# Patient Record
Sex: FEMALE | Race: WHITE | NOT HISPANIC OR LATINO | Employment: FULL TIME | URBAN - METROPOLITAN AREA
[De-identification: names, ages, dates, MRNs, and addresses within clinical notes are randomized per-mention and may not be internally consistent; named-entity substitution may affect disease eponyms.]

---

## 2017-01-31 ENCOUNTER — GENERIC CONVERSION - ENCOUNTER (OUTPATIENT)
Dept: OTHER | Facility: OTHER | Age: 57
End: 2017-01-31

## 2017-01-31 ENCOUNTER — TRANSCRIBE ORDERS (OUTPATIENT)
Dept: ADMINISTRATIVE | Facility: HOSPITAL | Age: 57
End: 2017-01-31

## 2017-01-31 DIAGNOSIS — Z12.31 VISIT FOR SCREENING MAMMOGRAM: Primary | ICD-10-CM

## 2017-02-06 ENCOUNTER — HOSPITAL ENCOUNTER (OUTPATIENT)
Dept: RADIOLOGY | Facility: CLINIC | Age: 57
Discharge: HOME | End: 2017-02-06
Payer: COMMERCIAL

## 2017-02-06 ENCOUNTER — ALLSCRIPTS OFFICE VISIT (OUTPATIENT)
Dept: OTHER | Facility: OTHER | Age: 57
End: 2017-02-06

## 2017-02-06 DIAGNOSIS — M25.552 PAIN IN LEFT HIP: ICD-10-CM

## 2017-02-06 DIAGNOSIS — M25.551 PAIN IN RIGHT HIP: ICD-10-CM

## 2017-02-06 DIAGNOSIS — M54.16 RADICULOPATHY OF LUMBAR REGION: ICD-10-CM

## 2017-02-06 DIAGNOSIS — M54.50 LOW BACK PAIN: ICD-10-CM

## 2017-02-06 PROCEDURE — 72100 X-RAY EXAM L-S SPINE 2/3 VWS: CPT

## 2017-02-06 PROCEDURE — 73521 X-RAY EXAM HIPS BI 2 VIEWS: CPT

## 2017-02-21 ENCOUNTER — ALLSCRIPTS OFFICE VISIT (OUTPATIENT)
Dept: OTHER | Facility: OTHER | Age: 57
End: 2017-02-21

## 2017-02-21 ENCOUNTER — LAB REQUISITION (OUTPATIENT)
Dept: LAB | Facility: HOSPITAL | Age: 57
End: 2017-02-21
Payer: COMMERCIAL

## 2017-02-21 DIAGNOSIS — R39.9 UNSPECIFIED SYMPTOMS AND SIGNS INVOLVING THE GENITOURINARY SYSTEM: ICD-10-CM

## 2017-02-21 LAB
BILIRUB UR QL STRIP: NEGATIVE
CLARITY UR: NORMAL
COLOR UR: YELLOW
GLUCOSE (HISTORICAL): NEGATIVE
HGB UR QL STRIP.AUTO: NORMAL
KETONES UR STRIP-MCNC: NORMAL MG/DL
LEUKOCYTE ESTERASE UR QL STRIP: NORMAL
NITRITE UR QL STRIP: NEGATIVE
PH UR STRIP.AUTO: 5.5 [PH]
PROT UR STRIP-MCNC: NEGATIVE MG/DL
SP GR UR STRIP.AUTO: 1.02
UROBILINOGEN UR QL STRIP.AUTO: 0.2

## 2017-02-21 PROCEDURE — 87077 CULTURE AEROBIC IDENTIFY: CPT | Performed by: FAMILY MEDICINE

## 2017-02-21 PROCEDURE — 87186 SC STD MICRODIL/AGAR DIL: CPT | Performed by: FAMILY MEDICINE

## 2017-02-21 PROCEDURE — 87086 URINE CULTURE/COLONY COUNT: CPT | Performed by: FAMILY MEDICINE

## 2017-02-24 LAB — BACTERIA UR CULT: NORMAL

## 2017-02-25 ENCOUNTER — GENERIC CONVERSION - ENCOUNTER (OUTPATIENT)
Dept: OTHER | Facility: OTHER | Age: 57
End: 2017-02-25

## 2017-02-27 ENCOUNTER — HOSPITAL ENCOUNTER (OUTPATIENT)
Dept: MAMMOGRAPHY | Facility: MEDICAL CENTER | Age: 57
Discharge: HOME | End: 2017-02-27
Payer: COMMERCIAL

## 2017-02-27 DIAGNOSIS — Z12.31 VISIT FOR SCREENING MAMMOGRAM: ICD-10-CM

## 2017-02-27 PROCEDURE — G0202 SCR MAMMO BI INCL CAD: HCPCS

## 2017-02-27 PROCEDURE — 77063 BREAST TOMOSYNTHESIS BI: CPT

## 2017-04-08 ENCOUNTER — APPOINTMENT (OUTPATIENT)
Dept: LAB | Facility: MEDICAL CENTER | Age: 57
End: 2017-04-08
Payer: COMMERCIAL

## 2017-04-08 ENCOUNTER — TRANSCRIBE ORDERS (OUTPATIENT)
Dept: ADMINISTRATIVE | Facility: HOSPITAL | Age: 57
End: 2017-04-08

## 2017-04-08 DIAGNOSIS — R05.9 COUGH: ICD-10-CM

## 2017-04-08 DIAGNOSIS — F32.A VASCULAR DEMENTIA WITH DEPRESSED MOOD (HCC): ICD-10-CM

## 2017-04-08 DIAGNOSIS — E78.5 OTHER AND UNSPECIFIED HYPERLIPIDEMIA: ICD-10-CM

## 2017-04-08 DIAGNOSIS — R05.9 COUGH: Primary | ICD-10-CM

## 2017-04-08 DIAGNOSIS — Z13.0 SCREENING FOR IRON DEFICIENCY ANEMIA: ICD-10-CM

## 2017-04-08 DIAGNOSIS — G47.00 PERSISTENT DISORDER OF INITIATING OR MAINTAINING SLEEP: ICD-10-CM

## 2017-04-08 DIAGNOSIS — F01.50 VASCULAR DEMENTIA WITH DEPRESSED MOOD (HCC): ICD-10-CM

## 2017-04-08 DIAGNOSIS — K21.9 GASTROESOPHAGEAL REFLUX DISEASE WITHOUT ESOPHAGITIS: ICD-10-CM

## 2017-04-08 LAB
ALBUMIN SERPL BCP-MCNC: 3.5 G/DL (ref 3.5–5)
ALP SERPL-CCNC: 87 U/L (ref 46–116)
ALT SERPL W P-5'-P-CCNC: 33 U/L (ref 12–78)
ANION GAP SERPL CALCULATED.3IONS-SCNC: 7 MMOL/L (ref 4–13)
AST SERPL W P-5'-P-CCNC: 21 U/L (ref 5–45)
BASOPHILS # BLD AUTO: 0.05 THOUSANDS/ΜL (ref 0–0.1)
BASOPHILS NFR BLD AUTO: 1 % (ref 0–1)
BILIRUB SERPL-MCNC: 0.43 MG/DL (ref 0.2–1)
BUN SERPL-MCNC: 11 MG/DL (ref 5–25)
CALCIUM SERPL-MCNC: 8.8 MG/DL (ref 8.3–10.1)
CHLORIDE SERPL-SCNC: 106 MMOL/L (ref 100–108)
CHOLEST SERPL-MCNC: 231 MG/DL (ref 50–200)
CO2 SERPL-SCNC: 26 MMOL/L (ref 21–32)
CREAT SERPL-MCNC: 0.68 MG/DL (ref 0.6–1.3)
EOSINOPHIL # BLD AUTO: 0.19 THOUSAND/ΜL (ref 0–0.61)
EOSINOPHIL NFR BLD AUTO: 4 % (ref 0–6)
ERYTHROCYTE [DISTWIDTH] IN BLOOD BY AUTOMATED COUNT: 13.5 % (ref 11.6–15.1)
GFR SERPL CREATININE-BSD FRML MDRD: >60 ML/MIN/1.73SQ M
GLUCOSE P FAST SERPL-MCNC: 94 MG/DL (ref 65–99)
HCT VFR BLD AUTO: 43.1 % (ref 34.8–46.1)
HDLC SERPL-MCNC: 83 MG/DL (ref 40–60)
HGB BLD-MCNC: 14.5 G/DL (ref 11.5–15.4)
LDLC SERPL CALC-MCNC: 122 MG/DL (ref 0–100)
LYMPHOCYTES # BLD AUTO: 1.98 THOUSANDS/ΜL (ref 0.6–4.47)
LYMPHOCYTES NFR BLD AUTO: 41 % (ref 14–44)
MCH RBC QN AUTO: 30.9 PG (ref 26.8–34.3)
MCHC RBC AUTO-ENTMCNC: 33.6 G/DL (ref 31.4–37.4)
MCV RBC AUTO: 92 FL (ref 82–98)
MONOCYTES # BLD AUTO: 0.34 THOUSAND/ΜL (ref 0.17–1.22)
MONOCYTES NFR BLD AUTO: 7 % (ref 4–12)
NEUTROPHILS # BLD AUTO: 2.27 THOUSANDS/ΜL (ref 1.85–7.62)
NEUTS SEG NFR BLD AUTO: 47 % (ref 43–75)
NRBC BLD AUTO-RTO: 0 /100 WBCS
PLATELET # BLD AUTO: 282 THOUSANDS/UL (ref 149–390)
PMV BLD AUTO: 9.7 FL (ref 8.9–12.7)
POTASSIUM SERPL-SCNC: 3.8 MMOL/L (ref 3.5–5.3)
PROT SERPL-MCNC: 7.2 G/DL (ref 6.4–8.2)
RBC # BLD AUTO: 4.7 MILLION/UL (ref 3.81–5.12)
SODIUM SERPL-SCNC: 139 MMOL/L (ref 136–145)
TRIGL SERPL-MCNC: 131 MG/DL
TSH SERPL DL<=0.05 MIU/L-ACNC: 3.51 UIU/ML (ref 0.36–3.74)
WBC # BLD AUTO: 4.84 THOUSAND/UL (ref 4.31–10.16)

## 2017-04-08 PROCEDURE — 84443 ASSAY THYROID STIM HORMONE: CPT

## 2017-04-08 PROCEDURE — 80061 LIPID PANEL: CPT

## 2017-04-08 PROCEDURE — 36415 COLL VENOUS BLD VENIPUNCTURE: CPT

## 2017-04-08 PROCEDURE — 85025 COMPLETE CBC W/AUTO DIFF WBC: CPT

## 2017-04-08 PROCEDURE — 80053 COMPREHEN METABOLIC PANEL: CPT

## 2017-04-25 ENCOUNTER — ALLSCRIPTS OFFICE VISIT (OUTPATIENT)
Dept: OTHER | Facility: OTHER | Age: 57
End: 2017-04-25

## 2017-10-07 ENCOUNTER — TRANSCRIBE ORDERS (OUTPATIENT)
Dept: ADMINISTRATIVE | Facility: HOSPITAL | Age: 57
End: 2017-10-07

## 2017-10-07 ENCOUNTER — APPOINTMENT (OUTPATIENT)
Dept: LAB | Facility: MEDICAL CENTER | Age: 57
End: 2017-10-07
Payer: COMMERCIAL

## 2017-10-07 DIAGNOSIS — Z13.0 SCREENING FOR IRON DEFICIENCY ANEMIA: ICD-10-CM

## 2017-10-07 DIAGNOSIS — F32.A VASCULAR DEMENTIA WITH DEPRESSED MOOD (HCC): ICD-10-CM

## 2017-10-07 DIAGNOSIS — G47.00 PERSISTENT DISORDER OF INITIATING OR MAINTAINING SLEEP: ICD-10-CM

## 2017-10-07 DIAGNOSIS — R05.9 COUGH: ICD-10-CM

## 2017-10-07 DIAGNOSIS — F01.50 VASCULAR DEMENTIA WITH DEPRESSED MOOD (HCC): ICD-10-CM

## 2017-10-07 DIAGNOSIS — K21.9 GASTROESOPHAGEAL REFLUX DISEASE WITHOUT ESOPHAGITIS: ICD-10-CM

## 2017-10-07 DIAGNOSIS — E78.5 HYPERLIPIDEMIA, UNSPECIFIED HYPERLIPIDEMIA TYPE: ICD-10-CM

## 2017-10-07 DIAGNOSIS — R05.9 COUGH: Primary | ICD-10-CM

## 2017-10-07 LAB
ALBUMIN SERPL BCP-MCNC: 3.5 G/DL (ref 3.5–5)
ALP SERPL-CCNC: 93 U/L (ref 46–116)
ALT SERPL W P-5'-P-CCNC: 31 U/L (ref 12–78)
ANION GAP SERPL CALCULATED.3IONS-SCNC: 6 MMOL/L (ref 4–13)
AST SERPL W P-5'-P-CCNC: 26 U/L (ref 5–45)
BASOPHILS # BLD AUTO: 0.04 THOUSANDS/ΜL (ref 0–0.1)
BASOPHILS NFR BLD AUTO: 1 % (ref 0–1)
BILIRUB SERPL-MCNC: 0.45 MG/DL (ref 0.2–1)
BUN SERPL-MCNC: 11 MG/DL (ref 5–25)
CALCIUM SERPL-MCNC: 8.4 MG/DL (ref 8.3–10.1)
CHLORIDE SERPL-SCNC: 106 MMOL/L (ref 100–108)
CHOLEST SERPL-MCNC: 242 MG/DL (ref 50–200)
CO2 SERPL-SCNC: 27 MMOL/L (ref 21–32)
CREAT SERPL-MCNC: 0.73 MG/DL (ref 0.6–1.3)
EOSINOPHIL # BLD AUTO: 0.19 THOUSAND/ΜL (ref 0–0.61)
EOSINOPHIL NFR BLD AUTO: 4 % (ref 0–6)
ERYTHROCYTE [DISTWIDTH] IN BLOOD BY AUTOMATED COUNT: 13 % (ref 11.6–15.1)
GFR SERPL CREATININE-BSD FRML MDRD: 90 ML/MIN/1.73SQ M
GLUCOSE P FAST SERPL-MCNC: 86 MG/DL (ref 65–99)
HCT VFR BLD AUTO: 44 % (ref 34.8–46.1)
HDLC SERPL-MCNC: 77 MG/DL (ref 40–60)
HGB BLD-MCNC: 14.9 G/DL (ref 11.5–15.4)
LDLC SERPL CALC-MCNC: 129 MG/DL (ref 0–100)
LYMPHOCYTES # BLD AUTO: 2.04 THOUSANDS/ΜL (ref 0.6–4.47)
LYMPHOCYTES NFR BLD AUTO: 42 % (ref 14–44)
MCH RBC QN AUTO: 31.2 PG (ref 26.8–34.3)
MCHC RBC AUTO-ENTMCNC: 33.9 G/DL (ref 31.4–37.4)
MCV RBC AUTO: 92 FL (ref 82–98)
MONOCYTES # BLD AUTO: 0.36 THOUSAND/ΜL (ref 0.17–1.22)
MONOCYTES NFR BLD AUTO: 7 % (ref 4–12)
NEUTROPHILS # BLD AUTO: 2.21 THOUSANDS/ΜL (ref 1.85–7.62)
NEUTS SEG NFR BLD AUTO: 46 % (ref 43–75)
NRBC BLD AUTO-RTO: 0 /100 WBCS
PLATELET # BLD AUTO: 315 THOUSANDS/UL (ref 149–390)
PMV BLD AUTO: 9.9 FL (ref 8.9–12.7)
POTASSIUM SERPL-SCNC: 4.2 MMOL/L (ref 3.5–5.3)
PROT SERPL-MCNC: 7.5 G/DL (ref 6.4–8.2)
RBC # BLD AUTO: 4.78 MILLION/UL (ref 3.81–5.12)
SODIUM SERPL-SCNC: 139 MMOL/L (ref 136–145)
TRIGL SERPL-MCNC: 180 MG/DL
TSH SERPL DL<=0.05 MIU/L-ACNC: 2.56 UIU/ML (ref 0.36–3.74)
WBC # BLD AUTO: 4.85 THOUSAND/UL (ref 4.31–10.16)

## 2017-10-07 PROCEDURE — 80061 LIPID PANEL: CPT

## 2017-10-07 PROCEDURE — 84443 ASSAY THYROID STIM HORMONE: CPT

## 2017-10-07 PROCEDURE — 80053 COMPREHEN METABOLIC PANEL: CPT

## 2017-10-07 PROCEDURE — 36415 COLL VENOUS BLD VENIPUNCTURE: CPT

## 2017-10-07 PROCEDURE — 85025 COMPLETE CBC W/AUTO DIFF WBC: CPT

## 2017-10-12 ENCOUNTER — ALLSCRIPTS OFFICE VISIT (OUTPATIENT)
Dept: OTHER | Facility: OTHER | Age: 57
End: 2017-10-12

## 2017-10-14 NOTE — PROGRESS NOTES
Assessment  1  Acquired hypothyroidism (244 9) (E03 9)   2  Depressive disorder (311) (F32 9)   3  Gastroesophageal reflux disease (530 81) (K21 9)   4  Hyperlipidemia (272 4) (E78 5)    Plan  Acquired hypothyroidism, Depressive disorder, Gastroesophageal reflux disease,  Hyperlipidemia    · (1) COMPREHENSIVE METABOLIC PANEL; Status:Active; Requested for:12Apr2018;    · (1) LIPID PANEL FASTING W DIRECT LDL REFLEX; Status:Active; Requested  for:12Apr2018;    · (1) TSH WITH FT4 REFLEX; Status:Active; Requested for:12Apr2018; Influenza vaccine needed    · Stop: Fluzone Quadrivalent Intramuscular Suspension    Discussion/Summary    Patient is a 78-year-old FDyslipidemia - reviewed, FLP with patient today  Her fasting lipid grossly unchanged she again was advised on the months following a very strict low-fat/low-cholesterol dietHypothyroidism - she is clinically asystematic today  Continue with current treatment of levothyroxine  Thyroid function testing has all been stable  Depressive disorder - patient's mood appears stable today  Continue with current treatment of Lexapro  GERD - patient's symptoms appear very well-controlled  Continue with dietary trigger avoidance as well as treatment with PPI  Follow-up in 6 months  Chief Complaint  Pt presents for medication f/u and BW review  History of Present Illness  Patient is a 58-year-old female presents today for a follow-up on her chronic conditions  She has depressive disorder, hypothyroidism, GERD, hyperlipidemia  She has been taking her medications regularly and tolerating them very well  Denies any side effects with the medications  Review of Systems    Constitutional: No fever, no chills, feels well, no tiredness, no recent weight gain or weight loss  Eyes: No complaints of eye pain, no red eyes, no eyesight problems, no discharge, no dry eyes, no itching of eyes     ENT: no complaints of earache, no loss of hearing, no nose bleeds, no nasal discharge, no sore throat, no hoarseness  Cardiovascular: No complaints of slow heart rate, no fast heart rate, no chest pain, no palpitations, no leg claudication, no lower extremity edema  Respiratory: No complaints of shortness of breath, no wheezing, no cough, no SOB on exertion, no orthopnea, no PND  Gastrointestinal: No complaints of abdominal pain, no constipation, no nausea or vomiting, no diarrhea, no bloody stools  Genitourinary: No complaints of dysuria, no incontinence, no pelvic pain, no dysmenorrhea, no vaginal discharge or bleeding  Musculoskeletal: No complaints of arthralgias, no myalgias, no joint swelling or stiffness, no limb pain or swelling  Integumentary: No complaints of skin rash or lesions, no itching, no skin wounds, no breast pain or lump  Neurological: No complaints of headache, no confusion, no convulsions, no numbness, no dizziness or fainting, no tingling, no limb weakness, no difficulty walking  Psychiatric: Not suicidal, no sleep disturbance, no anxiety or depression, no change in personality, no emotional problems  Endocrine: No complaints of proptosis, no hot flashes, no muscle weakness, no deepening of the voice, no feelings of weakness  Hematologic/Lymphatic: No complaints of swollen glands, no swollen glands in the neck, does not bleed easily, does not bruise easily  Active Problems  1  Acquired hypothyroidism (244 9) (E03 9)   2  Acute right hip pain (719 45) (M25 551)   3  Bilateral hip pain (719 45) (M25 551,M25 552)   4  Chronic cough (786 2) (R05)   5  Depressive disorder (311) (F32 9)   6  Gastroesophageal reflux disease (530 81) (K21 9)   7  Greater trochanteric bursitis, left (726 5) (M70 62)   8  Hyperlipidemia (272 4) (E78 5)   9  Insomnia (780 52) (G47 00)   10  Lower back pain (724 2) (M54 5)   11  Obesity (278 00) (E66 9)   12  Right lumbar radiculopathy (724 4) (M54 16)   13  Sacroiliitis (720 2) (M46 1)   14   Screening for iron deficiency anemia (V78 0) (Z13 0)    Past Medical History  1  History of Acute UTI (599 0) (N39 0)   2  History of acute bacterial sinusitis (V12 69) (Z87 09)   3  History of hiatal hernia (V12 79) (Z87 19)   4  History of Need for influenza vaccination (V04 81) (Z23)   5  History of Post herpetic neuralgia (053 19) (B02 29)   6  History of Symptoms involving urinary system (788 99) (R39 9)    The active problems and past medical history were reviewed and updated today  Surgical History    The surgical history was reviewed and updated today  Family History  Mother    1  Family history of diabetes mellitus (V18 0) (Z83 3)   2  Family history of Lymphocyte depleted Hodgkin lymphoma, unspecified body region  Sister    3  Family history of malignant neoplasm of female breast (V16 3) (Z80 3)    The family history was reviewed and updated today  Social History   · Always uses seat belt   · Feels safe at home   · Former smoker (F86 09) (D72 200)  The social history was reviewed and updated today  The social history was reviewed and is unchanged  Current Meds   1  B Complex CAPS; Therapy: (Recorded:51Geg0359) to Recorded   2  Biotin CAPS; take 1 capsule daily; Therapy: (Recorded:92Wrx0387) to Recorded   3  Centrum Silver Adult 50+ Oral Tablet; Therapy: (Recorded:17Wns9523) to Recorded   4  Escitalopram Oxalate 10 MG Oral Tablet; take 1 tablet every day; Therapy: 81RNJ7429 to (PIUAGOWQ:75KNL6909)  Requested for: 63Xos7508; Last   Rx:94Xmh8208 Ordered   5  Iron TABS; Therapy: (Recorded:28Bnk3019) to Recorded   6  [address] [facility]; Therapy: (Recorded:27Kyk7087) to Recorded   7  Levothyroxine Sodium 75 MCG Oral Tablet; Take 1 tablet daily; Therapy: 71WOU2837 to (Last Rx:03Oct2017)  Requested for: 14NIZ7908 Ordered   8  NexIUM 20 MG Oral Capsule Delayed Release; TAKE 3 CAPSULE Weekly; Therapy: (Recorded:12Oct2017) to Recorded   9   ProAir  (90 Base) MCG/ACT Inhalation Aerosol Solution; INHALE 1 TO 2 PUFFS   EVERY 4 TO 6 HOURS AS NEEDED; Therapy: 98NOC7864 to (Last Rx:53Lex4034) Ordered    The medication list was reviewed and updated today  Allergies  1  No Known Drug Allergies    Vitals  Vital Signs    Recorded: 48CMR7514 05:06PM   Temperature 99 3 F, Tympanic   Heart Rate 81   Pulse Quality Normal   Respiration Quality Normal   Respiration 17   Systolic 404, LUE, Sitting   Diastolic 90, LUE, Sitting   Height 5 ft 5 2 in   Weight 178 lb 3 oz   BMI Calculated 29 47   BSA Calculated 1 89   O2 Saturation 97, RA   LMP Hysterectomy   Pain Scale 0     Physical Exam    Constitutional   General appearance: No acute distress, well appearing and well nourished  Eyes   Conjunctiva and lids: No swelling, erythema or discharge  Pupils and irises: Equal, round and reactive to light  Ears, Nose, Mouth, and Throat   External inspection of ears and nose: Normal     Nasal mucosa, septum, and turbinates: Normal without edema or erythema  Oropharynx: Normal with no erythema, edema, exudate or lesions  Pulmonary   Respiratory effort: No increased work of breathing or signs of respiratory distress  Auscultation of lungs: Clear to auscultation  Cardiovascular   Auscultation of heart: Normal rate and rhythm, normal S1 and S2, without murmurs  Examination of extremities for edema and/or varicosities: Normal     Abdomen   Abdomen: Non-tender, no masses  Liver and spleen: No hepatomegaly or splenomegaly  Lymphatic   Palpation of lymph nodes in neck: No lymphadenopathy  Musculoskeletal   Gait and station: Normal     Inspection/palpation of joints, bones, and muscles: Normal     Skin   Skin and subcutaneous tissue: Normal without rashes or lesions  Neurologic   Cranial nerves: Cranial nerves 2-12 intact  Sensation: No sensory loss  Psychiatric   Orientation to person, place, and time: Normal     Mood and affect: Normal          Signatures   Electronically signed by :  Marlene Irby DO; Oct 13 2017  4:06PM EST                       (Author)

## 2017-10-16 ENCOUNTER — GENERIC CONVERSION - ENCOUNTER (OUTPATIENT)
Dept: OTHER | Facility: OTHER | Age: 57
End: 2017-10-16

## 2017-10-16 LAB
ADDITIONAL INFORMATION (HISTORICAL): NORMAL
ADEQUACY: (HISTORICAL): NORMAL
COMMENT (HISTORICAL): NORMAL
CYTOTECHNOLOGIST: (HISTORICAL): NORMAL
GENERAL CATEGORIZATION (HISTORICAL): NORMAL
HPV MRNA E6/E7 (HISTORICAL): NORMAL
INFECTION (HISTORICAL): NORMAL
INTERPRETATION (HISTORICAL): NORMAL
LMP (HISTORICAL): NORMAL
PATHOLOGIST (HISTORICAL): NORMAL
PREV. BX: (HISTORICAL): NORMAL
PREV. PAP (HISTORICAL): NORMAL
REVIEWED BY (HISTORICAL): NORMAL
SOURCE (HISTORICAL): NORMAL
SPECIMEN STATUS REPORT (HISTORICAL): NORMAL

## 2017-11-27 ENCOUNTER — ALLSCRIPTS OFFICE VISIT (OUTPATIENT)
Dept: OTHER | Facility: OTHER | Age: 57
End: 2017-11-27

## 2017-11-28 NOTE — PROGRESS NOTES
Assessment    1  Acute sinusitis, recurrence not specified, unspecified location (461 9) (J01 90)   2  Acute left otitis media (382 9) (H66 92)    Plan  Acute left otitis media    · Ciprodex 0 3-0 1 % Otic Suspension; INSTILL 4 DROPS IN THE AFFECTEDEAR(S) TWICE DAILY  Acute left otitis media, Acute sinusitis, recurrence not specified, unspecified location    · Amoxicillin-Pot Clavulanate 875-125 MG Oral Tablet; TAKE 1 TABLET EVERY 12HOURS UNTIL GONE   · Fluticasone Propionate 50 MCG/ACT Nasal Suspension; 2 sprays each nostril daily    Discussion/Summary    57-year-old female here today for concern of left ear symptoms for the past few days  Unfortunately she has chronic bilateral hair problems and has had several surgeries  She follows with ENT through [facility] and her specialist is currently on vacation  Based on assessment today I will place her on Ciprodex antibiotic ear drops that she will use to the left ear canal twice daily in addition to covering with oral antibiotics, Augmentin b i d  times 10 days  I recommended that we also have her start fluticasone nasal spray to help with sinus pressure and may also give her some ear pressure relief  If she has no better in the next week or so she needs to reach out to her ENT specialist    Possible side effects of new medications were reviewed with the patient/guardian today  The treatment plan was reviewed with the patient/guardian  The patient/guardian understands and agrees with the treatment plan      Chief Complaint  Pt c/o cough, congestion, and a bloody discharge from her L ear x 1 week  History of Present Illness  HPI: 55y/o female here today for 1 week of left ear bleeding and pain  She has chronic ear problems and sees ENT at [facility] of surgeries  she currently has sinus sxs  Review of Systems   Constitutional: No fever, no chills, feels well, no tiredness, no recent weight gain or loss,-- no fever-- and-- no chills    ENT: as noted in HPI  Cardiovascular: no complaints of slow or fast heart rate, no chest pain, no palpitations, no leg claudication or lower extremity edema  Respiratory: no complaints of shortness of breath, no wheezing, no dyspnea on exertion, no orthopnea or PND  Active Problems  1  Acquired hypothyroidism (244 9) (E03 9)   2  Acute right hip pain (719 45) (M25 551)   3  Bilateral hip pain (719 45) (M25 551,M25 552)   4  Chronic cough (786 2) (R05)   5  Depressive disorder (311) (F32 9)   6  Gastroesophageal reflux disease (530 81) (K21 9)   7  Greater trochanteric bursitis, left (726 5) (M70 62)   8  Hyperlipidemia (272 4) (E78 5)   9  Influenza vaccine needed (V04 81) (Z23)   10  Insomnia (780 52) (G47 00)   11  Lower back pain (724 2) (M54 5)   12  Obesity (278 00) (E66 9)   13  Right lumbar radiculopathy (724 4) (M54 16)   14  Sacroiliitis (720 2) (M46 1)   15  Screening for cervical cancer (V76 2) (Z12 4)   16  Screening for iron deficiency anemia (V78 0) (Z13 0)    Past Medical History  1  History of Acute UTI (599 0) (N39 0)   2  History of acute bacterial sinusitis (V12 69) (Z87 09)   3  History of hiatal hernia (V12 79) (Z87 19)   4  History of Need for influenza vaccination (V04 81) (Z23)   5  History of Post herpetic neuralgia (053 19) (B02 29)   6  History of Symptoms involving urinary system (788 99) (R39 9)    Family History  Mother    1  Family history of diabetes mellitus (V18 0) (Z83 3)   2  Family history of Lymphocyte depleted Hodgkin lymphoma, unspecified body region  Sister    3  Family history of malignant neoplasm of female breast (V16 3) (Z80 3)    Social History     · Always uses seat belt   · Feels safe at home   · Former smoker (R26 53) (N26 108)  The social history was reviewed and updated today  Current Meds   1  B Complex CAPS; Therapy: (Recorded:28Gpd8707) to Recorded   2  Biotin CAPS; take 1 capsule daily; Therapy: (Recorded:78Ujn0769) to Recorded   3   Centrum Silver Adult 50+ Oral Tablet; Therapy: (Recorded:96Pag1232) to Recorded   4  Escitalopram Oxalate 10 MG Oral Tablet; take 1 tablet every day; Therapy: 63LJC4029 to (LJNQIVIV:40YTR7862)  Requested for: 24Jul2017; Last Rx:39Qxi4655 Ordered   5  Iron TABS; Therapy: (Recorded:26Cyp3429) to Recorded   6  [address] [facility]; Therapy: (Recorded:86Dqw3138) to Recorded   7  Levothyroxine Sodium 75 MCG Oral Tablet; Take 1 tablet daily; Therapy: 75CCE0232 to (Last Rx:24Oct2017)  Requested for: 24Oct2017 Ordered   8  NexIUM 20 MG Oral Capsule Delayed Release; TAKE 3 CAPSULE Weekly; Therapy: (Recorded:12Oct2017) to Recorded   9  ProAir  (90 Base) MCG/ACT Inhalation Aerosol Solution; INHALE 1 TO 2 PUFFS EVERY 4 TO 6 HOURS AS NEEDED; Therapy: 89BNT7798 to (Last Rx:65Yjt0417) Ordered    The medication list was reviewed and updated today  Allergies  1  No Known Drug Allergies    Vitals   Recorded: 50XIA7718 05:02PM   Temperature 98 7 F, Tympanic   Heart Rate 76   Respiration Quality Normal   Respiration 18   Systolic 410, LUE, Sitting   Diastolic 78, LUE, Sitting   Weight 178 lb 4 oz   BMI Calculated 29 48   BSA Calculated 1 89   O2 Saturation 98, RA   Pain Scale 0       Physical Exam   Constitutional  General appearance: No acute distress, well appearing and well nourished  appears healthy,-- comfortable-- and-- appearance reflects stated age  Ears, Nose, Mouth, and Throat  Otoscopic examination: Abnormal  -- RIGHT tm DULL, SLIGHTLY RETRACTED, LEFT TM NOT CLEARLY VISIBLE WITH DRIED CERUMEN/BLOOD IN EAR CANAL  NO EAR CANAL SWELLING OR PUS  Nasal mucosa, septum, and turbinates: Abnormal   There was a mucoid discharge from both nares  The bilateral nasal mucosa was boggy,-- edematous-- and-- red  MILD SWELLING AND ERYTHEMA B/L  Oropharynx: Normal with no erythema, edema, exudate or lesions  Pulmonary  Respiratory effort: No increased work of breathing or signs of respiratory distress  Auscultation of lungs: Clear to auscultation  Cardiovascular  Auscultation of heart: Normal rate and rhythm, normal S1 and S2, without murmurs  Lymphatic  Palpation of lymph nodes in neck: No lymphadenopathy  Psychiatric  Orientation to person, place, and time: Normal    Mood and affect: Normal    Additional Exam:  vitals reviewed  Results/Data  PHQ-9 Adult Depression Screening 27Nov2017 05:07PM User, Zahraa     Test Name Result Flag Reference   PHQ-9 Adult Depression Score 1       Over the last two weeks, how often have you been bothered by any of the following problems? Little interest or pleasure in doing things: Not at all - 0 Feeling down, depressed, or hopeless: Not at all - 0 Trouble falling or staying asleep, or sleeping too much: Not at all - 0 Feeling tired or having little energy: Several days - 1 Poor appetite or over eating: Not at all - 0 Feeling bad about yourself - or that you are a failure or have let yourself or your family down: Not at all - 0 Trouble concentrating on things, such as reading the newspaper or watching television: Not at all - 0 Moving or speaking so slowly that other people could have noticed  Or the opposite -  being so fidgety or restless that you have been moving around a lot more than usual: Not at all - 0 Thoughts that you would be better off dead, or of hurting yourself in some way: Not at all - 0   PHQ-9 Adult Depression Screening Negative     PHQ-9 Difficulty Level Not difficult at all     PHQ-9 Severity Minimal Depression           Signatures   Electronically signed by : Ayana Allred, [facility]; Nov 27 2017  5:40PM EST                       (Author)    Electronically signed by :  Stevie Alfonso DO; Nov 27 2017 10:19PM EST                       (Author)

## 2017-12-28 ENCOUNTER — ALLSCRIPTS OFFICE VISIT (OUTPATIENT)
Dept: OTHER | Facility: OTHER | Age: 57
End: 2017-12-28

## 2017-12-29 NOTE — PROGRESS NOTES
Assessment   1  Acute sinusitis, recurrence not specified, unspecified location (461 9) (J01 90)   2  Acute bronchitis (466 0) (J20 9)    Plan   Acute bronchitis    · LevoFLOXacin 500 MG Oral Tablet; Take 1 tablet daily   · Promethazine-Codeine 6 25-10 MG/5ML Oral Syrup; TAKE 5 ML BY MOUTH AT    BEDTIME AS NEEDED    Discussion/Summary      Patient is a 58-year-old female with acute sinusitis and bronchitis  She was seen in the office a month ago and prescribed amoxicillin/clavulanic acid and developed a severe reaction  She had swelling of her face and then developed a rash and her skin was peeling  She was diagnosed with Bernal-Sheldon syndrome  She had been on steroids  She now has symptoms of cough, tightness on her chest and sinus drainage  I prescribed 10 days of levofloxacin and cough medicine with codeine for bedtime  Possible side effects of new medications were reviewed with the patient/guardian today  The treatment plan was reviewed with the patient/guardian  The patient/guardian understands and agrees with the treatment plan      Chief Complaint   1  Cold Symptoms  Patient presents with c/o cough and congestion  She stated that she also has some heaviness on her chest       History of Present Illness   HPI: Patient has bad cough  relates that she developed Valdo Neigh Sheldon's syndrome after being on Augmentin  Cold Symptoms: Calros [place] presents with complaints of cold symptoms  Associated symptoms include post nasal drainage,-- sore throat,-- productive cough,-- facial pain,-- fever-- and-- chills, but-- no nausea,-- no vomiting-- and-- no diarrhea  Review of Systems        Constitutional: No fever, no chills, feels well, no tiredness, no recent weight gain or loss  ENT: as noted in HPI  Cardiovascular: no complaints of slow or fast heart rate, no chest pain, no palpitations, no leg claudication or lower extremity edema  Respiratory: as noted in HPI        Gastrointestinal: no complaints of abdominal pain, no constipation, no nausea or diarrhea, no vomiting, no bloody stools  Active Problems   1  Acquired hypothyroidism (244 9) (E03 9)   2  Acute sinusitis, recurrence not specified, unspecified location (461 9) (J01 90)   3  Bilateral hip pain (719 45) (M25 551,M25 552)   4  Chronic cough (786 2) (R05)   5  Depressive disorder (311) (F32 9)   6  Gastroesophageal reflux disease (530 81) (K21 9)   7  Greater trochanteric bursitis, left (726 5) (M70 62)   8  Hyperlipidemia (272 4) (E78 5)   9  Insomnia (780 52) (G47 00)   10  Lower back pain (724 2) (M54 5)   11  Obesity (278 00) (E66 9)   12  Right lumbar radiculopathy (724 4) (M54 16)   13  Sacroiliitis (720 2) (M46 1)    Past Medical History   1  History of Acute UTI (599 0) (N39 0)   2  History of acute bacterial sinusitis (V12 69) (Z87 09)   3  History of hiatal hernia (V12 79) (Z87 19)   4  History of Need for influenza vaccination (V04 81) (Z23)   5  History of Post herpetic neuralgia (053 19) (B02 29)   6  History of Symptoms involving urinary system (788 99) (R39 9)  Active Problems And Past Medical History Reviewed: The active problems and past medical history were reviewed and updated today  Family History   Mother    1  Family history of diabetes mellitus (V18 0) (Z83 3)   2  Family history of Lymphocyte depleted Hodgkin lymphoma, unspecified body region  Sister    3  Family history of malignant neoplasm of female breast (V16 3) (Z80 3)    Social History    · Always uses seat belt   · Feels safe at home   · Former smoker (M43 04) (P16 216)  The social history was reviewed and updated today  The social history was reviewed and is unchanged  Current Meds    1  B Complex CAPS; Therapy: (Recorded:43Njg2915) to Recorded   2  Biotin CAPS; take 1 capsule daily; Therapy: (Recorded:23Yio8639) to Recorded   3  Centrum Silver Adult 50+ Oral Tablet; Therapy: (Recorded:23Joc6663) to Recorded   4   Escitalopram Oxalate 10 MG Oral Tablet; take 1 tablet every day; Therapy: 67VSH0596 to (BZGCPGNU:45XKJ6737)  Requested for: 09Cyt5711; Last     Rx:50Dpa5703 Ordered   5  Fluticasone Propionate 50 MCG/ACT Nasal Suspension; 2 sprays each nostril daily; Therapy: 82WAL3478 to (Last 781 1704)  Requested for: [phone] Ordered   6  Iron TABS; Therapy: (Recorded:88Zma3660) to Recorded   7  [address] [facility]; Therapy: (Recorded:69Tkn7921) to Recorded   8  Levothyroxine Sodium 75 MCG Oral Tablet; Take 1 tablet daily; Therapy: 85JPX8101 to (Last Rx:24Oct2017)  Requested for: 24Oct2017 Ordered   9  NexIUM 20 MG Oral Capsule Delayed Release; TAKE 3 CAPSULE Weekly; Therapy: (Recorded:12Oct2017) to Recorded   10  ProAir  (90 Base) MCG/ACT Inhalation Aerosol Solution; INHALE 1 TO 2 PUFFS      EVERY 4 TO 6 HOURS AS NEEDED; Therapy: 27RBZ2600 to (Last Rx:14Jnt5935) Ordered     The medication list was reviewed and updated today  Allergies   1  Amoxicillin-Pot Clavulanate TABS    Vitals    Recorded: 28Dec2017 05:15PM   Temperature 98 6 F, Tympanic   Heart Rate 84   Pulse Quality Normal   Systolic 678, LUE, Sitting   Diastolic 92, LUE, Sitting   BP CUFF SIZE Large   Height 5 ft 5 in   Weight 177 lb 9 oz   BMI Calculated 29 55   BSA Calculated 1 88   O2 Saturation 96, RA     Physical Exam        Ears, Nose, Mouth, and Throat      External inspection of ears and nose: Normal        Otoscopic examination: Abnormal  -- right TM appearsscarred  fluid in left ear canal       Oropharynx: Abnormal   The posterior pharynx was erythematous-- and-- with thick post nasal drainage  , but-- did not have an exudate  Pulmonary      Respiratory effort: No increased work of breathing or signs of respiratory distress  Auscultation of lungs: Abnormal  -- Coarse breath sounds bilaterally  Cardiovascular      Auscultation of heart: Normal rate and rhythm, normal S1 and S2, without murmurs  -- /90  Lymphatic      Palpation of lymph nodes in neck: No lymphadenopathy         Musculoskeletal      Gait and station: Normal        Psychiatric      Orientation to person, place, and time: Normal        Mood and affect: Normal           Signatures    Electronically signed by : Mk Modi DO; Dec 28 2017  5:43PM EST                       (Author)

## 2018-01-12 VITALS
HEART RATE: 73 BPM | SYSTOLIC BLOOD PRESSURE: 136 MMHG | DIASTOLIC BLOOD PRESSURE: 90 MMHG | BODY MASS INDEX: 29.16 KG/M2 | HEIGHT: 65 IN | OXYGEN SATURATION: 98 % | TEMPERATURE: 99.5 F | WEIGHT: 175 LBS

## 2018-01-13 VITALS
HEART RATE: 81 BPM | TEMPERATURE: 99.3 F | DIASTOLIC BLOOD PRESSURE: 90 MMHG | WEIGHT: 178.19 LBS | BODY MASS INDEX: 29.69 KG/M2 | OXYGEN SATURATION: 97 % | RESPIRATION RATE: 17 BRPM | HEIGHT: 65 IN | SYSTOLIC BLOOD PRESSURE: 142 MMHG

## 2018-01-13 VITALS
OXYGEN SATURATION: 98 % | SYSTOLIC BLOOD PRESSURE: 124 MMHG | WEIGHT: 178.25 LBS | BODY MASS INDEX: 29.66 KG/M2 | TEMPERATURE: 98.7 F | DIASTOLIC BLOOD PRESSURE: 78 MMHG | HEART RATE: 76 BPM | RESPIRATION RATE: 18 BRPM

## 2018-01-13 NOTE — RESULT NOTES
Verified Results  (1) URINE CULTURE 95Elj4286 05:02PM Jolie Archibald Order Number: [id number]     Test Name Result Flag Reference   CLINICAL REPORT (Report)     Test:        Urine culture  Specimen Type:   Urine  Specimen Date:   2/21/2017 5:02 PM  Result Date:    2/24/2017 11:31 AM  Result Status:   Final result  Resulting Lab:   [facility] [address] [place] [address]            Tel: [phone]      CULTURE                                       ------------------                                   80,000-89,000 cfu/ml Escherichia coli      SUSCEPTIBILITY                                   ------------------                                                       Escherichia coli  METHOD                 ALMA  -------------------------------------  --------------------------  AMPICILLIN ($$)             >16 00 ug/ml Resistant  AMPICILLIN + SULBACTAM ($)       >16/8 ug/ml  Resistant  AZTREONAM ($$$)             <=8 ug/ml   Susceptible  CEFAZOLIN ($)              >16 00 ug/ml Resistant  CEFOTAXIME ($)             8 ug/ml    Susceptible  CEFTAZIDIME ($$)            8 ug/ml    Susceptible  CEFTRIAXONE ($$)            32 00 ug/ml  Intermediate  CEFUROXIME ($$)             >16 ug/ml   Resistant  CIPROFLOXACIN ($)            <=1 00 ug/ml Susceptible  GENTAMICIN ($$)             <=4 ug/ml   Susceptible  LEVOFLOXACIN ($)            <=2 00 ug/ml Susceptible  NITROFURANTOIN             <=32 ug/ml  Susceptible  PIPERACILLIN + TAZOBACTAM ($$$)     <=16 ug/ml  Susceptible  TETRACYCLINE              >8 ug/ml   Resistant  TOBRAMYCIN ($)             <=4 ug/ml   Susceptible  TRIMETHOPRIM + SULFAMETHOXAZOLE ($$$)  >2/38 ug/ml  Resistant

## 2018-01-14 VITALS
OXYGEN SATURATION: 98 % | BODY MASS INDEX: 30.2 KG/M2 | WEIGHT: 181.25 LBS | DIASTOLIC BLOOD PRESSURE: 96 MMHG | SYSTOLIC BLOOD PRESSURE: 150 MMHG | HEART RATE: 78 BPM | TEMPERATURE: 97.5 F | HEIGHT: 65 IN

## 2018-01-14 VITALS
SYSTOLIC BLOOD PRESSURE: 142 MMHG | BODY MASS INDEX: 29.84 KG/M2 | DIASTOLIC BLOOD PRESSURE: 86 MMHG | HEART RATE: 108 BPM | HEIGHT: 65 IN | WEIGHT: 179.13 LBS

## 2018-01-23 VITALS
HEART RATE: 84 BPM | TEMPERATURE: 98.6 F | OXYGEN SATURATION: 96 % | DIASTOLIC BLOOD PRESSURE: 92 MMHG | HEIGHT: 65 IN | WEIGHT: 177.56 LBS | BODY MASS INDEX: 29.58 KG/M2 | SYSTOLIC BLOOD PRESSURE: 132 MMHG

## 2018-02-27 ENCOUNTER — TRANSCRIBE ORDERS (OUTPATIENT)
Dept: ADMINISTRATIVE | Facility: HOSPITAL | Age: 58
End: 2018-02-27

## 2018-02-27 DIAGNOSIS — Z12.31 VISIT FOR SCREENING MAMMOGRAM: Primary | ICD-10-CM

## 2018-03-15 ENCOUNTER — HOSPITAL ENCOUNTER (OUTPATIENT)
Dept: MAMMOGRAPHY | Facility: MEDICAL CENTER | Age: 58
Discharge: HOME | End: 2018-03-15
Payer: COMMERCIAL

## 2018-03-15 DIAGNOSIS — Z12.31 VISIT FOR SCREENING MAMMOGRAM: ICD-10-CM

## 2018-03-15 PROCEDURE — 77063 BREAST TOMOSYNTHESIS BI: CPT

## 2018-03-15 PROCEDURE — 77067 SCR MAMMO BI INCL CAD: CPT

## 2018-04-12 DIAGNOSIS — E78.5 HYPERLIPIDEMIA: ICD-10-CM

## 2018-04-12 DIAGNOSIS — K21.9 GASTRO-ESOPHAGEAL REFLUX DISEASE WITHOUT ESOPHAGITIS: ICD-10-CM

## 2018-04-12 DIAGNOSIS — F32.9 MAJOR DEPRESSIVE DISORDER, SINGLE EPISODE: ICD-10-CM

## 2018-04-12 DIAGNOSIS — E03.9 HYPOTHYROIDISM: ICD-10-CM

## 2018-04-22 DIAGNOSIS — E03.9 HYPOTHYROIDISM, UNSPECIFIED TYPE: Primary | ICD-10-CM

## 2018-04-22 RX ORDER — LEVOTHYROXINE SODIUM 0.07 MG/1
TABLET ORAL
Qty: 90 TABLET | Refills: 1 | Status: SHIPPED | OUTPATIENT
Start: 2018-04-22 | End: 2018-06-11 | Stop reason: SDUPTHER

## 2018-06-07 ENCOUNTER — TRANSCRIBE ORDERS (OUTPATIENT)
Dept: FAMILY MEDICINE CLINIC | Facility: CLINIC | Age: 58
End: 2018-06-07

## 2018-06-08 RX ORDER — ESOMEPRAZOLE MAGNESIUM 20 MG/1
3 FOR SUSPENSION ORAL WEEKLY
COMMUNITY
End: 2018-06-11

## 2018-06-08 RX ORDER — ALBUTEROL SULFATE 90 UG/1
2 AEROSOL, METERED RESPIRATORY (INHALATION)
Status: ON HOLD | COMMUNITY
Start: 2016-07-13 | End: 2019-02-05 | Stop reason: ALTCHOICE

## 2018-06-08 RX ORDER — LACTOBACILLUS ACIDOPHILUS 700MM CELL
1 CAPSULE ORAL DAILY
Status: ON HOLD | COMMUNITY
End: 2019-02-05 | Stop reason: ALTCHOICE

## 2018-06-08 RX ORDER — B-COMPLEX WITH VITAMIN C
CAPSULE ORAL
COMMUNITY
End: 2022-02-21

## 2018-06-08 RX ORDER — FERROUS SULFATE 325(65) MG
TABLET ORAL
COMMUNITY
End: 2018-06-11

## 2018-06-08 RX ORDER — ESCITALOPRAM OXALATE 10 MG/1
1 TABLET ORAL DAILY
COMMUNITY
Start: 2016-07-13 | End: 2018-07-21 | Stop reason: SDUPTHER

## 2018-06-08 RX ORDER — ESOMEPRAZOLE MAGNESIUM 10 MG/1
GRANULE, FOR SUSPENSION, EXTENDED RELEASE ORAL
COMMUNITY
End: 2019-06-18

## 2018-06-08 RX ORDER — FERROUS SULFATE 325(65) MG
325 TABLET ORAL
Status: ON HOLD | COMMUNITY
End: 2019-02-05 | Stop reason: ALTCHOICE

## 2018-06-08 RX ORDER — MULTIVITAMIN WITH FOLIC ACID 400 MCG
1 TABLET ORAL
COMMUNITY
End: 2018-06-11

## 2018-06-11 ENCOUNTER — APPOINTMENT (OUTPATIENT)
Dept: LAB | Facility: MEDICAL CENTER | Age: 58
End: 2018-06-11
Payer: COMMERCIAL

## 2018-06-11 ENCOUNTER — OFFICE VISIT (OUTPATIENT)
Dept: FAMILY MEDICINE CLINIC | Facility: CLINIC | Age: 58
End: 2018-06-11
Payer: COMMERCIAL

## 2018-06-11 VITALS
OXYGEN SATURATION: 98 % | DIASTOLIC BLOOD PRESSURE: 82 MMHG | TEMPERATURE: 99.2 F | BODY MASS INDEX: 30.9 KG/M2 | HEIGHT: 64 IN | RESPIRATION RATE: 16 BRPM | WEIGHT: 181 LBS | HEART RATE: 79 BPM | SYSTOLIC BLOOD PRESSURE: 132 MMHG

## 2018-06-11 DIAGNOSIS — F32.A DEPRESSIVE DISORDER: ICD-10-CM

## 2018-06-11 DIAGNOSIS — E03.9 HYPOTHYROIDISM: ICD-10-CM

## 2018-06-11 DIAGNOSIS — F32.9 MAJOR DEPRESSIVE DISORDER, SINGLE EPISODE: ICD-10-CM

## 2018-06-11 DIAGNOSIS — E78.5 HYPERLIPIDEMIA, UNSPECIFIED HYPERLIPIDEMIA TYPE: Primary | ICD-10-CM

## 2018-06-11 DIAGNOSIS — K21.9 GASTROESOPHAGEAL REFLUX DISEASE WITHOUT ESOPHAGITIS: ICD-10-CM

## 2018-06-11 DIAGNOSIS — E03.9 HYPOTHYROIDISM, UNSPECIFIED TYPE: ICD-10-CM

## 2018-06-11 DIAGNOSIS — K21.9 GASTRO-ESOPHAGEAL REFLUX DISEASE WITHOUT ESOPHAGITIS: ICD-10-CM

## 2018-06-11 DIAGNOSIS — E78.5 HYPERLIPIDEMIA: ICD-10-CM

## 2018-06-11 PROBLEM — M46.1 SACROILIITIS (HCC): Status: ACTIVE | Noted: 2017-02-06

## 2018-06-11 LAB
ALBUMIN SERPL BCP-MCNC: 3.7 G/DL (ref 3.5–5)
ALP SERPL-CCNC: 97 U/L (ref 46–116)
ALT SERPL W P-5'-P-CCNC: 33 U/L (ref 12–78)
ANION GAP SERPL CALCULATED.3IONS-SCNC: 11 MMOL/L (ref 4–13)
AST SERPL W P-5'-P-CCNC: 27 U/L (ref 5–45)
BILIRUB SERPL-MCNC: 0.43 MG/DL (ref 0.2–1)
BUN SERPL-MCNC: 12 MG/DL (ref 5–25)
CALCIUM SERPL-MCNC: 9.1 MG/DL (ref 8.3–10.1)
CHLORIDE SERPL-SCNC: 102 MMOL/L (ref 100–108)
CHOLEST SERPL-MCNC: 241 MG/DL (ref 50–200)
CO2 SERPL-SCNC: 25 MMOL/L (ref 21–32)
CREAT SERPL-MCNC: 0.78 MG/DL (ref 0.6–1.3)
GFR SERPL CREATININE-BSD FRML MDRD: 83 ML/MIN/1.73SQ M
GLUCOSE P FAST SERPL-MCNC: 72 MG/DL (ref 65–99)
HDLC SERPL-MCNC: 67 MG/DL (ref 40–60)
LDLC SERPL CALC-MCNC: 107 MG/DL (ref 0–100)
POTASSIUM SERPL-SCNC: 3.9 MMOL/L (ref 3.5–5.3)
PROT SERPL-MCNC: 7.5 G/DL (ref 6.4–8.2)
SODIUM SERPL-SCNC: 138 MMOL/L (ref 136–145)
T4 FREE SERPL-MCNC: 0.84 NG/DL (ref 0.76–1.46)
TRIGL SERPL-MCNC: 333 MG/DL
TSH SERPL DL<=0.05 MIU/L-ACNC: 5.7 UIU/ML (ref 0.36–3.74)

## 2018-06-11 PROCEDURE — 84439 ASSAY OF FREE THYROXINE: CPT

## 2018-06-11 PROCEDURE — 80053 COMPREHEN METABOLIC PANEL: CPT

## 2018-06-11 PROCEDURE — 36415 COLL VENOUS BLD VENIPUNCTURE: CPT

## 2018-06-11 PROCEDURE — 1036F TOBACCO NON-USER: CPT | Performed by: FAMILY MEDICINE

## 2018-06-11 PROCEDURE — 80061 LIPID PANEL: CPT

## 2018-06-11 PROCEDURE — 99214 OFFICE O/P EST MOD 30 MIN: CPT | Performed by: FAMILY MEDICINE

## 2018-06-11 PROCEDURE — 84443 ASSAY THYROID STIM HORMONE: CPT

## 2018-06-11 RX ORDER — LEVOTHYROXINE SODIUM 88 UG/1
88 TABLET ORAL DAILY
Qty: 90 TABLET | Refills: 1 | Status: SHIPPED | OUTPATIENT
Start: 2018-06-11 | End: 2018-06-18 | Stop reason: SDUPTHER

## 2018-06-11 RX ORDER — NICOTINE POLACRILEX 2 MG
GUM BUCCAL
COMMUNITY

## 2018-06-11 NOTE — PROGRESS NOTES
Assessment/Plan:   1  Hypothyroidism, unspecified type  Patient has been noticing mild fatigue over the past few months  She believes that this was secondary to her changes in work  Her thyroid function testing appeared mildly abnormal   At this time, secondary to her hypothyroid state, will increase her dose of levothyroxine to 88 mcg  Recheck her blood work in 6 months  - levothyroxine 88 mcg tablet; Take 1 tablet (88 mcg total) by mouth daily for 90 days  Dispense: 90 tablet; Refill: 1  - TSH, 3rd generation with T4 reflex; Future    2  Hyperlipidemia, unspecified hyperlipidemia type  Reviewed fasting lipid panel with patient today  Her cholesterol levels appear uncontrolled  Her triglycerides have increased to 333  She was advised on the importance of maintaining a very strict low-fat/low-cholesterol diet  Will hold off on initiation of treatment  If her cholesterol is not improving at her next blood work checked, she will likely need to start statin treatment  - Lipid Panel with Direct LDL reflex; Future    3  Gastroesophageal reflux disease without esophagitis  Appears very well controlled  Continue with dietary trigger avoidance as well as current treatment with Nexium  4  Depressive disorder  Patient's mood appears very well controlled  At this time, continue with current treatment of Lexapro  Follow-up in 6 months  - Comprehensive metabolic panel; Future     There are no diagnoses linked to this encounter  Subjective:    Chief Complaint   Patient presents with    Follow-up     BW REVIEW         Patient ID: Humble Kumar is a 59 y o  female  Patient is a 47-year-old female presents for follow-up chronic conditions  She has depression, hyperlipidemia, GERD as well as hypothyroidism  She has been taking her medications regularly and tolerating them very well  Denies adverse reactions with the medications    She recently had blood work completed in light reviewed this today         Review of Systems   Constitutional: Negative for activity change, chills, fatigue and fever  HENT: Negative for congestion, ear pain, sinus pressure and sore throat  Eyes: Negative for redness, itching and visual disturbance  Respiratory: Negative for cough and shortness of breath  Cardiovascular: Negative for chest pain and palpitations  Gastrointestinal: Negative for abdominal pain, diarrhea and nausea  Endocrine: Negative for cold intolerance and heat intolerance  Genitourinary: Negative for dysuria, flank pain and frequency  Musculoskeletal: Negative for arthralgias, back pain, gait problem and myalgias  Skin: Negative for color change  Allergic/Immunologic: Negative for environmental allergies  Neurological: Negative for dizziness, numbness and headaches  Psychiatric/Behavioral: Negative for behavioral problems and sleep disturbance  The following portions of the patient's history were reviewed and updated as appropriate : past family history, past medical history, past social history and past surgical history  Objective:    Vitals:    06/11/18 1726   BP: 132/82   BP Location: Left arm   Patient Position: Sitting   Cuff Size: Adult   Pulse: 79   Resp: 16   Temp: 99 2 °F (37 3 °C)   TempSrc: Tympanic   SpO2: 98%   Weight: 82 1 kg (181 lb)   Height: 5' 3 94" (1 624 m)        Physical Exam   Constitutional: She is oriented to person, place, and time  She appears well-developed and well-nourished  HENT:   Head: Normocephalic and atraumatic  Nose: Nose normal    Mouth/Throat: No oropharyngeal exudate  Eyes: Pupils are equal, round, and reactive to light  Right eye exhibits no discharge  Left eye exhibits no discharge  Neck: Normal range of motion  Neck supple  No tracheal deviation present  Cardiovascular: Normal rate, regular rhythm and intact distal pulses  Exam reveals no gallop and no friction rub  No murmur heard    Pulses:       Dorsalis pedis pulses are 2+ on the right side, and 2+ on the left side  Posterior tibial pulses are 2+ on the right side, and 2+ on the left side  Pulmonary/Chest: Effort normal and breath sounds normal  No respiratory distress  She has no wheezes  She has no rales  Abdominal: Soft  Bowel sounds are normal  She exhibits no distension  There is no tenderness  There is no rebound and no guarding  Musculoskeletal: Normal range of motion  She exhibits no edema  Lymphadenopathy:        Head (right side): No submental and no submandibular adenopathy present  Head (left side): No submental and no submandibular adenopathy present  She has no cervical adenopathy  Right cervical: No superficial cervical, no deep cervical and no posterior cervical adenopathy present  Left cervical: No superficial cervical, no deep cervical and no posterior cervical adenopathy present  Neurological: She is alert and oriented to person, place, and time  No cranial nerve deficit or sensory deficit  Skin: Skin is warm, dry and intact  Psychiatric: Her speech is normal and behavior is normal  Judgment normal  Her mood appears not anxious  Cognition and memory are normal  She does not exhibit a depressed mood  Vitals reviewed

## 2018-06-18 DIAGNOSIS — E03.9 HYPOTHYROIDISM, UNSPECIFIED TYPE: ICD-10-CM

## 2018-06-18 RX ORDER — LEVOTHYROXINE SODIUM 88 UG/1
88 TABLET ORAL DAILY
Qty: 90 TABLET | Refills: 0 | Status: SHIPPED | OUTPATIENT
Start: 2018-06-18 | End: 2018-08-30 | Stop reason: SDUPTHER

## 2018-06-18 NOTE — TELEPHONE ENCOUNTER
RX was sent to [facility], patient is requesting that RX be sent to [facility] due to cost

## 2018-07-21 DIAGNOSIS — F32.A DEPRESSIVE DISORDER: Primary | ICD-10-CM

## 2018-07-21 RX ORDER — ESCITALOPRAM OXALATE 10 MG/1
TABLET ORAL
Qty: 90 TABLET | Refills: 1 | Status: SHIPPED | OUTPATIENT
Start: 2018-07-21 | End: 2019-01-17 | Stop reason: SDUPTHER

## 2018-08-30 DIAGNOSIS — E03.9 HYPOTHYROIDISM, UNSPECIFIED TYPE: ICD-10-CM

## 2018-08-31 RX ORDER — LEVOTHYROXINE SODIUM 88 UG/1
TABLET ORAL
Qty: 90 TABLET | Refills: 0 | Status: SHIPPED | OUTPATIENT
Start: 2018-08-31 | End: 2018-11-29 | Stop reason: SDUPTHER

## 2018-11-29 DIAGNOSIS — E03.9 HYPOTHYROIDISM, UNSPECIFIED TYPE: ICD-10-CM

## 2018-11-29 RX ORDER — LEVOTHYROXINE SODIUM 88 UG/1
TABLET ORAL
Qty: 90 TABLET | Refills: 0 | Status: SHIPPED | OUTPATIENT
Start: 2018-11-29 | End: 2019-02-27 | Stop reason: SDUPTHER

## 2018-12-11 LAB
ALBUMIN SERPL-MCNC: 4.2 G/DL (ref 3.6–5.1)
ALBUMIN/GLOB SERPL: 1.6 (CALC) (ref 1–2.5)
ALP SERPL-CCNC: 88 U/L (ref 33–130)
ALT SERPL-CCNC: 21 U/L (ref 6–29)
AST SERPL-CCNC: 23 U/L (ref 10–35)
BILIRUB SERPL-MCNC: 0.5 MG/DL (ref 0.2–1.2)
BUN SERPL-MCNC: 12 MG/DL (ref 7–25)
BUN/CREAT SERPL: NORMAL (CALC) (ref 6–22)
CALCIUM SERPL-MCNC: 9.1 MG/DL (ref 8.6–10.4)
CHLORIDE SERPL-SCNC: 106 MMOL/L (ref 98–110)
CHOLEST SERPL-MCNC: 229 MG/DL
CHOLEST/HDLC SERPL: 3.1 (CALC)
CO2 SERPL-SCNC: 25 MMOL/L (ref 20–32)
CREAT SERPL-MCNC: 0.71 MG/DL (ref 0.5–0.99)
GLOBULIN SER CALC-MCNC: 2.7 G/DL (CALC) (ref 1.9–3.7)
GLUCOSE SERPL-MCNC: 81 MG/DL (ref 65–99)
HDLC SERPL-MCNC: 73 MG/DL
LDLC SERPL CALC-MCNC: 122 MG/DL (CALC)
NONHDLC SERPL-MCNC: 156 MG/DL (CALC)
POTASSIUM SERPL-SCNC: 4.2 MMOL/L (ref 3.5–5.3)
PROT SERPL-MCNC: 6.9 G/DL (ref 6.1–8.1)
SL AMB EGFR AFRICAN AMERICAN: 107 ML/MIN/1.73M2
SL AMB EGFR NON AFRICAN AMERICAN: 93 ML/MIN/1.73M2
SODIUM SERPL-SCNC: 143 MMOL/L (ref 135–146)
TRIGL SERPL-MCNC: 216 MG/DL
TSH SERPL-ACNC: 1.17 MIU/L (ref 0.4–4.5)

## 2018-12-18 ENCOUNTER — OFFICE VISIT (OUTPATIENT)
Dept: FAMILY MEDICINE CLINIC | Facility: CLINIC | Age: 58
End: 2018-12-18
Payer: COMMERCIAL

## 2018-12-18 VITALS
TEMPERATURE: 99.5 F | OXYGEN SATURATION: 98 % | DIASTOLIC BLOOD PRESSURE: 70 MMHG | HEART RATE: 83 BPM | WEIGHT: 174 LBS | SYSTOLIC BLOOD PRESSURE: 128 MMHG | HEIGHT: 64 IN | BODY MASS INDEX: 29.71 KG/M2 | RESPIRATION RATE: 16 BRPM

## 2018-12-18 DIAGNOSIS — F32.A DEPRESSIVE DISORDER: ICD-10-CM

## 2018-12-18 DIAGNOSIS — E78.5 HYPERLIPIDEMIA, UNSPECIFIED HYPERLIPIDEMIA TYPE: ICD-10-CM

## 2018-12-18 DIAGNOSIS — K21.9 GASTROESOPHAGEAL REFLUX DISEASE WITHOUT ESOPHAGITIS: Primary | ICD-10-CM

## 2018-12-18 DIAGNOSIS — Z12.11 SCREENING FOR COLON CANCER: ICD-10-CM

## 2018-12-18 PROCEDURE — 99214 OFFICE O/P EST MOD 30 MIN: CPT | Performed by: FAMILY MEDICINE

## 2018-12-18 PROCEDURE — 3008F BODY MASS INDEX DOCD: CPT | Performed by: FAMILY MEDICINE

## 2018-12-18 PROCEDURE — 1036F TOBACCO NON-USER: CPT | Performed by: FAMILY MEDICINE

## 2018-12-18 NOTE — PROGRESS NOTES
Assessment/Plan:   1  Depressive disorder  Mood appears very well controlled today  She denies any worsening anxiety or depression  At this time, will continue with current treatment of Lexapro 10 mg daily  2  Gastroesophageal reflux disease without esophagitis  Symptoms appear very well controlled today  Continue with dietary trigger avoidance as well as current treatment with Nexium  She has been only using her Nexium 2 times per week dot    3  Hyperlipidemia, unspecified hyperlipidemia type  Reviewed fasting lipid panel with patient today  Her cholesterol levels appear improving  Her triglycerides are still  Continue with a strict low-fat/low-cholesterol diet  Follow up with patient in 6 months or p r n     4  Screening for colon cancer  - Ambulatory referral to General Surgery; Future     There are no diagnoses linked to this encounter  Subjective:    Chief Complaint   Patient presents with    Follow-up     6 months check up        Patient ID: Shraddha Poon is a 59 y o  female  Patient is a 68-year-old female presents today for a follow-up on her chronic conditions  She has depression, GERD, hyperlipidemia  She has been taking her medications dot denies adverse reactions with medications  Review of Systems   Constitutional: Negative for activity change, chills, fatigue and fever  HENT: Negative for congestion, ear pain, sinus pressure and sore throat  Eyes: Negative for redness, itching and visual disturbance  Respiratory: Negative for cough and shortness of breath  Cardiovascular: Negative for chest pain and palpitations  Gastrointestinal: Negative for abdominal pain, diarrhea and nausea  Endocrine: Negative for cold intolerance and heat intolerance  Genitourinary: Negative for dysuria, flank pain and frequency  Musculoskeletal: Negative for arthralgias, back pain, gait problem and myalgias  Skin: Negative for color change     Allergic/Immunologic: Negative for environmental allergies  Neurological: Negative for dizziness, numbness and headaches  Psychiatric/Behavioral: Negative for behavioral problems and sleep disturbance  The following portions of the patient's history were reviewed and updated as appropriate : past family history, past medical history, past social history and past surgical history  Current Outpatient Prescriptions:     B Complex-C CAPS, Take by mouth, Disp: , Rfl:     Biotin 1 MG CAPS, BID, Disp: , Rfl:     escitalopram (LEXAPRO) 10 mg tablet, TAKE 1 TABLET DAILY, Disp: 90 tablet, Rfl: 1    esomeprazole (NexIUM) 10 MG packet, Take by mouth, Disp: , Rfl:     Fish Oil-Krill Oil (KRILL OIL PLUS) CAPS, Take by mouth, Disp: , Rfl:     levothyroxine 88 mcg tablet, TAKE 1 TABLET DAILY, Disp: 90 tablet, Rfl: 0    Multiple Vitamins-Minerals (CENTRUM SILVER ADULT 50+ PO), Take by mouth, Disp: , Rfl:     albuterol (PROAIR HFA) 90 mcg/act inhaler, Inhale 2 puffs, Disp: , Rfl:     Calcium Carb-Cholecalciferol (CALCIUM 1000 + D PO), 1,000 mg BID, Disp: , Rfl:     ferrous sulfate 325 (65 Fe) mg tablet, Take 325 mg by mouth, Disp: , Rfl:     Lactobacillus (BIOTINEX) CAPS, Take 1 capsule by mouth daily, Disp: , Rfl:     Objective:    Vitals:    12/18/18 1723   BP: 128/70   BP Location: Left arm   Patient Position: Sitting   Cuff Size: Adult   Pulse: 83   Resp: 16   Temp: 99 5 °F (37 5 °C)   TempSrc: Tympanic   SpO2: 98%   Weight: 78 9 kg (174 lb)   Height: 5' 3 78" (1 62 m)        Physical Exam   Constitutional: She is oriented to person, place, and time  She appears well-developed and well-nourished  HENT:   Head: Normocephalic and atraumatic  Nose: Nose normal    Mouth/Throat: No oropharyngeal exudate  Eyes: Pupils are equal, round, and reactive to light  Right eye exhibits no discharge  Left eye exhibits no discharge  Neck: Normal range of motion  Neck supple  No tracheal deviation present     Cardiovascular: Normal rate, regular rhythm and intact distal pulses  Exam reveals no gallop and no friction rub  No murmur heard  Pulses:       Dorsalis pedis pulses are 2+ on the right side, and 2+ on the left side  Posterior tibial pulses are 2+ on the right side, and 2+ on the left side  Pulmonary/Chest: Effort normal and breath sounds normal  No respiratory distress  She has no wheezes  She has no rales  Abdominal: Soft  Bowel sounds are normal  She exhibits no distension  There is no tenderness  There is no rebound and no guarding  Musculoskeletal: Normal range of motion  She exhibits no edema  Lymphadenopathy:        Head (right side): No submental and no submandibular adenopathy present  Head (left side): No submental and no submandibular adenopathy present  She has no cervical adenopathy  Right cervical: No superficial cervical, no deep cervical and no posterior cervical adenopathy present  Left cervical: No superficial cervical, no deep cervical and no posterior cervical adenopathy present  Neurological: She is alert and oriented to person, place, and time  No cranial nerve deficit or sensory deficit  Skin: Skin is warm, dry and intact  Psychiatric: Her speech is normal and behavior is normal  Judgment normal  Her mood appears not anxious  Cognition and memory are normal  She does not exhibit a depressed mood  Vitals reviewed

## 2019-01-10 ENCOUNTER — OFFICE VISIT (OUTPATIENT)
Dept: FAMILY MEDICINE CLINIC | Facility: CLINIC | Age: 59
End: 2019-01-10
Payer: COMMERCIAL

## 2019-01-10 VITALS
HEART RATE: 89 BPM | WEIGHT: 176.3 LBS | HEIGHT: 64 IN | DIASTOLIC BLOOD PRESSURE: 80 MMHG | OXYGEN SATURATION: 98 % | SYSTOLIC BLOOD PRESSURE: 130 MMHG | BODY MASS INDEX: 30.1 KG/M2 | RESPIRATION RATE: 16 BRPM | TEMPERATURE: 99.8 F

## 2019-01-10 DIAGNOSIS — N39.0 ACUTE UTI: Primary | ICD-10-CM

## 2019-01-10 LAB
SL AMB  POCT GLUCOSE, UA: ABNORMAL
SL AMB LEUKOCYTE ESTERASE,UA: ABNORMAL
SL AMB POCT BILIRUBIN,UA: ABNORMAL
SL AMB POCT BLOOD,UA: ABNORMAL
SL AMB POCT CLARITY,UA: CLEAR
SL AMB POCT COLOR,UA: YELLOW
SL AMB POCT KETONES,UA: ABNORMAL
SL AMB POCT NITRITE,UA: ABNORMAL
SL AMB POCT PH,UA: 5
SL AMB POCT SPECIFIC GRAVITY,UA: 1.03
SL AMB POCT URINE PROTEIN: ABNORMAL
SL AMB POCT UROBILINOGEN: 0.2

## 2019-01-10 PROCEDURE — 81003 URINALYSIS AUTO W/O SCOPE: CPT | Performed by: FAMILY MEDICINE

## 2019-01-10 PROCEDURE — 99214 OFFICE O/P EST MOD 30 MIN: CPT | Performed by: FAMILY MEDICINE

## 2019-01-10 PROCEDURE — 3008F BODY MASS INDEX DOCD: CPT | Performed by: FAMILY MEDICINE

## 2019-01-10 PROCEDURE — 87086 URINE CULTURE/COLONY COUNT: CPT | Performed by: FAMILY MEDICINE

## 2019-01-10 RX ORDER — PHENAZOPYRIDINE HYDROCHLORIDE 200 MG/1
200 TABLET, FILM COATED ORAL
Qty: 21 TABLET | Refills: 0 | Status: ON HOLD | OUTPATIENT
Start: 2019-01-10 | End: 2019-02-05 | Stop reason: ALTCHOICE

## 2019-01-10 RX ORDER — CIPROFLOXACIN 500 MG/1
500 TABLET, FILM COATED ORAL EVERY 12 HOURS SCHEDULED
Qty: 14 TABLET | Refills: 0 | Status: SHIPPED | OUTPATIENT
Start: 2019-01-10 | End: 2019-01-17

## 2019-01-10 NOTE — PROGRESS NOTES
Assessment/Plan:   1  Acute UTI  Symptoms appear likely secondary to acute UTI  Urinalysis was positive for leukocytes and blood  Will send urine for culture  Start treatment empirically with Cipro as well as Pyridium  Follow up if any symptoms persist   - POCT urine dip auto non-scope  - Urine culture; Future  - Urine culture  - ciprofloxacin (CIPRO) 500 mg tablet; Take 1 tablet (500 mg total) by mouth every 12 (twelve) hours for 7 days  Dispense: 14 tablet; Refill: 0  - phenazopyridine (PYRIDIUM) 200 mg tablet; Take 1 tablet (200 mg total) by mouth 3 (three) times a day with meals  Dispense: 21 tablet; Refill: 0   Diagnoses and all orders for this visit:    Dysuria  -     POCT urine dip auto non-scope  -     Urine culture; Future  -     Urine culture          Subjective:    Chief Complaint   Patient presents with    Urinary Frequency     and burning times 1 week         Patient ID: Jeremy Balderrama is a 59 y o  female  Urinary Frequency    This is a new problem  The current episode started in the past 7 days  The problem occurs every urination  The problem has been unchanged  The quality of the pain is described as burning  The pain is mild  There has been no fever  Associated symptoms include frequency  Pertinent negatives include no chills, discharge, flank pain, hematuria, hesitancy, nausea or urgency  Treatments tried: Azo  The treatment provided mild relief  Review of Systems   Constitutional: Negative for activity change, chills, fatigue and fever  HENT: Negative for congestion, ear pain, sinus pressure and sore throat  Eyes: Negative for redness, itching and visual disturbance  Respiratory: Negative for cough and shortness of breath  Cardiovascular: Negative for chest pain and palpitations  Gastrointestinal: Negative for abdominal pain, diarrhea and nausea  Endocrine: Negative for cold intolerance and heat intolerance  Genitourinary: Positive for frequency   Negative for dysuria, flank pain, hematuria, hesitancy and urgency  Musculoskeletal: Negative for arthralgias, back pain, gait problem and myalgias  Skin: Negative for color change  Allergic/Immunologic: Negative for environmental allergies  Neurological: Negative for dizziness, numbness and headaches  Psychiatric/Behavioral: Negative for behavioral problems and sleep disturbance  The following portions of the patient's history were reviewed and updated as appropriate : past family history, past medical history, past social history and past surgical history  Current Outpatient Prescriptions:     albuterol (PROAIR HFA) 90 mcg/act inhaler, Inhale 2 puffs, Disp: , Rfl:     B Complex-C CAPS, Take by mouth, Disp: , Rfl:     Biotin 1 MG CAPS, BID, Disp: , Rfl:     Calcium Carb-Cholecalciferol (CALCIUM 1000 + D PO), 1,000 mg BID, Disp: , Rfl:     escitalopram (LEXAPRO) 10 mg tablet, TAKE 1 TABLET DAILY, Disp: 90 tablet, Rfl: 1    esomeprazole (NexIUM) 10 MG packet, Take by mouth, Disp: , Rfl:     ferrous sulfate 325 (65 Fe) mg tablet, Take 325 mg by mouth, Disp: , Rfl:     Fish Oil-Krill Oil (KRILL OIL PLUS) CAPS, Take by mouth, Disp: , Rfl:     Lactobacillus (BIOTINEX) CAPS, Take 1 capsule by mouth daily, Disp: , Rfl:     levothyroxine 88 mcg tablet, TAKE 1 TABLET DAILY, Disp: 90 tablet, Rfl: 0    Multiple Vitamins-Minerals (CENTRUM SILVER ADULT 50+ PO), Take by mouth, Disp: , Rfl:     Objective:    Vitals:    01/10/19 0749   BP: 130/80   BP Location: Left arm   Patient Position: Sitting   Cuff Size: Adult   Pulse: 89   Resp: 16   Temp: 99 8 °F (37 7 °C)   TempSrc: Tympanic   SpO2: 98%   Weight: 80 kg (176 lb 4 8 oz)   Height: 5' 4" (1 626 m)        Physical Exam   Constitutional: She is oriented to person, place, and time  She appears well-developed and well-nourished  HENT:   Head: Normocephalic and atraumatic  Nose: Nose normal    Mouth/Throat: No oropharyngeal exudate     Eyes: Pupils are equal, round, and reactive to light  Right eye exhibits no discharge  Left eye exhibits no discharge  Neck: Normal range of motion  Neck supple  No tracheal deviation present  Cardiovascular: Normal rate, regular rhythm and intact distal pulses  Exam reveals no gallop and no friction rub  No murmur heard  Pulses:       Dorsalis pedis pulses are 2+ on the right side, and 2+ on the left side  Posterior tibial pulses are 2+ on the right side, and 2+ on the left side  Pulmonary/Chest: Effort normal and breath sounds normal  No respiratory distress  She has no wheezes  She has no rales  Abdominal: Soft  Bowel sounds are normal  She exhibits no distension  There is no tenderness  There is no rebound and no guarding  Musculoskeletal: Normal range of motion  She exhibits no edema  Lymphadenopathy:        Head (right side): No submental and no submandibular adenopathy present  Head (left side): No submental and no submandibular adenopathy present  She has no cervical adenopathy  Right cervical: No superficial cervical, no deep cervical and no posterior cervical adenopathy present  Left cervical: No superficial cervical, no deep cervical and no posterior cervical adenopathy present  Neurological: She is alert and oriented to person, place, and time  No cranial nerve deficit or sensory deficit  Skin: Skin is warm, dry and intact  Psychiatric: Her speech is normal and behavior is normal  Judgment normal  Her mood appears not anxious  Cognition and memory are normal  She does not exhibit a depressed mood  Vitals reviewed

## 2019-01-12 LAB — BACTERIA UR CULT: ABNORMAL

## 2019-01-17 DIAGNOSIS — F32.A DEPRESSIVE DISORDER: ICD-10-CM

## 2019-01-17 RX ORDER — ESCITALOPRAM OXALATE 10 MG/1
TABLET ORAL
Qty: 90 TABLET | Refills: 1 | Status: SHIPPED | OUTPATIENT
Start: 2019-01-17 | End: 2019-07-16 | Stop reason: SDUPTHER

## 2019-01-22 ENCOUNTER — OFFICE VISIT (OUTPATIENT)
Dept: SURGERY | Facility: CLINIC | Age: 59
End: 2019-01-22
Payer: COMMERCIAL

## 2019-01-22 VITALS
WEIGHT: 173 LBS | HEART RATE: 109 BPM | RESPIRATION RATE: 16 BRPM | DIASTOLIC BLOOD PRESSURE: 62 MMHG | BODY MASS INDEX: 27.8 KG/M2 | SYSTOLIC BLOOD PRESSURE: 138 MMHG | TEMPERATURE: 98.2 F | HEIGHT: 66 IN

## 2019-01-22 DIAGNOSIS — Z12.11 ENCOUNTER FOR SCREENING COLONOSCOPY: Primary | ICD-10-CM

## 2019-01-22 PROCEDURE — 99243 OFF/OP CNSLTJ NEW/EST LOW 30: CPT | Performed by: PHYSICIAN ASSISTANT

## 2019-01-22 NOTE — LETTER
January 22, 2019     Orlando Mercado DO  [address]  [address] [facility]    Patient: Frances Heath   YOB: 1960   Date of Visit: 1/22/2019       Dear Dr May Rosado:    Thank you for referring Edvin Del Valle to me for evaluation  Below are my notes for this consultation  If you have questions, please do not hesitate to call me  I look forward to following your patient along with you  Sincerely,        Erin Coreas PA-C        CC: No Recipients  Anthony Marcia  1/22/2019  8:55 AM  Sign at close encounter  Assessment/Plan:   Frances Heath is a 59 y  o female who is here for a:     Screening Colonoscopy  Plan: Colonoscopy for: Screening for Colon Cancer        Previous Colonoscopy and  Location of polyps if any:   10 years ago and  with polyps in the :   no polyps or not applicable           Preoperative Clearance: None        ______________________________________________________    HPI:  Frances Heath is a 59 y  o female who was referred for evaluation of    Screening  Currently:     Symptoms include:  no abdominal pain, change in bowel habits, or black or bloody stools            Family history of colon cancer: None reported             Anticoagulation: none    LABS:      Lab Results   Component Value Date    WBC 4 85 10/07/2017    HGB 14 9 10/07/2017    HCT 44 0 10/07/2017    MCV 92 10/07/2017     10/07/2017     Lab Results   Component Value Date     12/19/2015    K 4 2 12/10/2018     12/10/2018    CO2 25 12/10/2018    ANIONGAP 9 12/19/2015    BUN 12 12/10/2018    CREATININE 0 78 06/11/2018    GLUCOSE 87 12/19/2015    GLUF 72 06/11/2018    CALCIUM 9 1 12/10/2018    AST 27 06/11/2018    ALT 33 06/11/2018    ALKPHOS 88 12/10/2018    PROT 7 5 12/19/2015    BILITOT 0 41 12/19/2015    EGFR 83 06/11/2018     No results found for: HGBA1C  No results found for: INR, PROTIME      No orders to display           ROS:  General ROS: negative for - chills, fatigue, fever or night sweats, weight loss  Respiratory ROS: no cough, shortness of breath, or wheezing  Cardiovascular ROS: no chest pain or dyspnea on exertion  Genito-Urinary ROS: no dysuria, trouble voiding, or hematuria  Musculoskeletal ROS: negative for - gait disturbance, joint pain or muscle pain  Neurological ROS: no TIA or stroke symptoms  GI ROS: see HPI  Skin ROS: no new rashes or lesions   Lymphatic ROS: no new adenopathy noted by pt  GYN ROS: see HPI, no new GYN history or bleeding noted  Psy ROS: no new mental or behavioral disturbances           Imaging: No new pertinent imaging studies           Patient Active Problem List   Diagnosis    Depressive disorder    Chronic cough    Hyperlipidemia    Insomnia    Gastroesophageal reflux disease    Sacroiliitis (HCC)         Allergies:  Amoxicillin-pot clavulanate      Current Outpatient Prescriptions:     B Complex-C CAPS, Take by mouth, Disp: , Rfl:     Biotin 1 MG CAPS, BID, Disp: , Rfl:     Calcium Carb-Cholecalciferol (CALCIUM 1000 + D PO), 1,000 mg BID, Disp: , Rfl:     escitalopram (LEXAPRO) 10 mg tablet, TAKE 1 TABLET DAILY, Disp: 90 tablet, Rfl: 1    esomeprazole (NexIUM) 10 MG packet, Take by mouth, Disp: , Rfl:     Fish Oil-Krill Oil (KRILL OIL PLUS) CAPS, Take by mouth, Disp: , Rfl:     levothyroxine 88 mcg tablet, TAKE 1 TABLET DAILY, Disp: 90 tablet, Rfl: 0    Multiple Vitamins-Minerals (CENTRUM SILVER ADULT 50+ PO), Take by mouth, Disp: , Rfl:     phenazopyridine (PYRIDIUM) 200 mg tablet, Take 1 tablet (200 mg total) by mouth 3 (three) times a day with meals, Disp: 21 tablet, Rfl: 0    albuterol (PROAIR HFA) 90 mcg/act inhaler, Inhale 2 puffs, Disp: , Rfl:     ferrous sulfate 325 (65 Fe) mg tablet, Take 325 mg by mouth, Disp: , Rfl:     Lactobacillus (BIOTINEX) CAPS, Take 1 capsule by mouth daily, Disp: , Rfl:     Past Medical History:   Diagnosis Date    Hiatal hernia     resolved 12/7/16    Post herpetic neuralgia     last assessed 7/13/16, resolved 12/7/16       Past Surgical History:   Procedure Laterality Date    EAR SURGERY      GASTRIC BYPASS      HYSTERECTOMY         Family History   Problem Relation Age of Onset    Diabetes Mother     Hodgkin's lymphoma Mother     Breast cancer Sister     Stroke Father        Social History     Social History    Marital status: /Civil Union     Spouse name: N/A    Number of children: N/A    Years of education: N/A     Social History Main Topics    Smoking status: Former Smoker    Smokeless tobacco: Never Used    Alcohol use Yes      Comment: SOCIAL    Drug use: No    Sexual activity: Yes     Partners: Male     Other Topics Concern    None     Social History Narrative    Always uses seat belt     Feels safe at home        Exam:   Vitals:    01/22/19 0844   BP: 138/62   Pulse: (!) 109   Resp: 16   Temp: 98 2 °F (36 8 °C)           ______________________________________________________    PHYSICAL EXAM  General Appearance:    Alert, cooperative, no distress, healthy     Head:    Normocephalic without obvious abnormality   Eyes:    PERRL, conjunctiva/corneas clear, EOM's intact        Neck:   Supple, no adenopathy, no JVD   Back:     Symmetric, no spinal or CVA tenderness   Lungs:     Clear to auscultation bilaterally, no wheezing or rhonchi   Heart:    Regular rate and rhythm, S1 and S2 normal, no murmur   Abdomen:     Soft, NTND, +BS   Extremities:   Extremities normal  No clubbing, cyanosis or edema   Psych:   Normal Affect   Neurologic:   CNII-XII intact  Strength symmetric, speech intact                 Agnes Ball PA-C  Date: 1/22/2019 Time: 8:54 AM       This report has been generated by a voice recognition software system  Therefore, there may be syntax, spelling, and/or grammatical errors  Please call if you've any questions  This  encounter has been billed by a non certified Coder            Informed consent for procedure was personally discussed, reviewed, and signed by Dr Ernie Koehler  Discussion by Dr Ernie Koehler was carried out regarding risks, benefits, and alternatives with the patient  Risks include but are not limited to:  bleeding, infection, and delayed wound healing or an open wound, pulmonary embolus, leaks from bowel or bile ducts or other viscus, transfusions, death  Discussed in further detail the more common complications and their rates of occurrence   was used if necessary  Patient expressed understanding of the issues discussed and wished/consented to proceed  All questions were answered by Dr Ernie Koehler  Discussion performed between patient and the provider signing below  Signature:   Mohini Nolan  Date: 1/22/2019 Time: 8:54 AM                                                                                                                                        Informed consent for procedure was personally discussed, reviewed, and signed by Dr Ernie Koehler  Discussion by Dr Ernie Koehler was carried out regarding risks, benefits, and alternatives with the patient  Risks include but are not limited to:  bleeding, infection, and delayed wound healing or an open wound, pulmonary embolus, leaks from bowel or bile ducts or other viscus, transfusions, death  Discussed in further detail the more common complications and their rates of occurrence   was used if necessary  Patient expressed understanding of the issues discussed and wished/consented to proceed  All questions were answered by Dr Ernie Koehler  Discussion performed between patient and the provider signing below  Signature:   Mohini Nolan    Date: 1/22/2019 Time: 8:54 AM           Some portions of this record may have been generated with voice recognition software  There may be translation, syntax,  or grammatical errors   Occasional wrong word or "sound-a-like" substitutions may have occurred due to the inherent limitations of the voice recognition software  Read the chart carefully and recognize, using context, where substitutions may have occurred  If you have any questions, please contact the dictating provider for clarification or correction, as needed  This encounter has been coded by a non-certified coder

## 2019-01-22 NOTE — PROGRESS NOTES
Assessment/Plan:   Gildardo Wood is a 59 y  o female who is here for a:     Screening Colonoscopy  Plan: Colonoscopy for: Screening for Colon Cancer        Previous Colonoscopy and  Location of polyps if any:   10 years ago and  with polyps in the :   no polyps or not applicable           Preoperative Clearance: None        ______________________________________________________    HPI:  Gildardo Wood is a 59 y  o female who was referred for evaluation of    Screening  Currently:     Symptoms include:  no abdominal pain, change in bowel habits, or black or bloody stools  Family history of colon cancer: None reported             Anticoagulation: none    LABS:      Lab Results   Component Value Date    WBC 4 85 10/07/2017    HGB 14 9 10/07/2017    HCT 44 0 10/07/2017    MCV 92 10/07/2017     10/07/2017     Lab Results   Component Value Date     12/19/2015    K 4 2 12/10/2018     12/10/2018    CO2 25 12/10/2018    ANIONGAP 9 12/19/2015    BUN 12 12/10/2018    CREATININE 0 78 06/11/2018    GLUCOSE 87 12/19/2015    GLUF 72 06/11/2018    CALCIUM 9 1 12/10/2018    AST 27 06/11/2018    ALT 33 06/11/2018    ALKPHOS 88 12/10/2018    PROT 7 5 12/19/2015    BILITOT 0 41 12/19/2015    EGFR 83 06/11/2018     No results found for: HGBA1C  No results found for: INR, PROTIME      No orders to display           ROS:  General ROS: negative for - chills, fatigue, fever or night sweats, weight loss  Respiratory ROS: no cough, shortness of breath, or wheezing  Cardiovascular ROS: no chest pain or dyspnea on exertion  Genito-Urinary ROS: no dysuria, trouble voiding, or hematuria  Musculoskeletal ROS: negative for - gait disturbance, joint pain or muscle pain  Neurological ROS: no TIA or stroke symptoms  GI ROS: see HPI  Skin ROS: no new rashes or lesions   Lymphatic ROS: no new adenopathy noted by pt     GYN ROS: see HPI, no new GYN history or bleeding noted  Psy ROS: no new mental or behavioral disturbances           Imaging: No new pertinent imaging studies           Patient Active Problem List   Diagnosis    Depressive disorder    Chronic cough    Hyperlipidemia    Insomnia    Gastroesophageal reflux disease    Sacroiliitis (HCC)         Allergies:  Amoxicillin-pot clavulanate      Current Outpatient Prescriptions:     B Complex-C CAPS, Take by mouth, Disp: , Rfl:     Biotin 1 MG CAPS, BID, Disp: , Rfl:     Calcium Carb-Cholecalciferol (CALCIUM 1000 + D PO), 1,000 mg BID, Disp: , Rfl:     escitalopram (LEXAPRO) 10 mg tablet, TAKE 1 TABLET DAILY, Disp: 90 tablet, Rfl: 1    esomeprazole (NexIUM) 10 MG packet, Take by mouth, Disp: , Rfl:     Fish Oil-Krill Oil (KRILL OIL PLUS) CAPS, Take by mouth, Disp: , Rfl:     levothyroxine 88 mcg tablet, TAKE 1 TABLET DAILY, Disp: 90 tablet, Rfl: 0    Multiple Vitamins-Minerals (CENTRUM SILVER ADULT 50+ PO), Take by mouth, Disp: , Rfl:     phenazopyridine (PYRIDIUM) 200 mg tablet, Take 1 tablet (200 mg total) by mouth 3 (three) times a day with meals, Disp: 21 tablet, Rfl: 0    albuterol (PROAIR HFA) 90 mcg/act inhaler, Inhale 2 puffs, Disp: , Rfl:     ferrous sulfate 325 (65 Fe) mg tablet, Take 325 mg by mouth, Disp: , Rfl:     Lactobacillus (BIOTINEX) CAPS, Take 1 capsule by mouth daily, Disp: , Rfl:     Past Medical History:   Diagnosis Date    Hiatal hernia     resolved 12/7/16    Post herpetic neuralgia     last assessed 7/13/16, resolved 12/7/16       Past Surgical History:   Procedure Laterality Date    EAR SURGERY      GASTRIC BYPASS      HYSTERECTOMY         Family History   Problem Relation Age of Onset    Diabetes Mother     Hodgkin's lymphoma Mother     Breast cancer Sister     Stroke Father        Social History     Social History    Marital status: /Civil Union     Spouse name: N/A    Number of children: N/A    Years of education: N/A     Social History Main Topics    Smoking status: Former Smoker    Smokeless tobacco: Never Used    Alcohol use Yes      Comment: SOCIAL    Drug use: No    Sexual activity: Yes     Partners: Male     Other Topics Concern    None     Social History Narrative    Always uses seat belt     Feels safe at home        Exam:   Vitals:    01/22/19 0844   BP: 138/62   Pulse: (!) 109   Resp: 16   Temp: 98 2 °F (36 8 °C)           ______________________________________________________    PHYSICAL EXAM  General Appearance:    Alert, cooperative, no distress, healthy     Head:    Normocephalic without obvious abnormality   Eyes:    PERRL, conjunctiva/corneas clear, EOM's intact        Neck:   Supple, no adenopathy, no JVD   Back:     Symmetric, no spinal or CVA tenderness   Lungs:     Clear to auscultation bilaterally, no wheezing or rhonchi   Heart:    Regular rate and rhythm, S1 and S2 normal, no murmur   Abdomen:     Soft, NTND, +BS   Extremities:   Extremities normal  No clubbing, cyanosis or edema   Psych:   Normal Affect   Neurologic:   CNII-XII intact  Strength symmetric, speech intact                 Marcos Curry PA-C  Date: 1/22/2019 Time: 8:54 AM       This report has been generated by a voice recognition software system  Therefore, there may be syntax, spelling, and/or grammatical errors  Please call if you've any questions  This  encounter has been billed by a non certified Coder  Informed consent for procedure was personally discussed, reviewed, and signed by Dr Nathalia Obando  Discussion by Dr Nathalia Obando was carried out regarding risks, benefits, and alternatives with the patient  Risks include but are not limited to:  bleeding, infection, and delayed wound healing or an open wound, pulmonary embolus, leaks from bowel or bile ducts or other viscus, transfusions, death  Discussed in further detail the more common complications and their rates of occurrence   was used if necessary    Patient expressed understanding of the issues discussed and wished/consented to proceed  All questions were answered by Dr Danielle Metz  Discussion performed between patient and the provider signing below  Signature:   Roselia Rg  Date: 1/22/2019 Time: 8:54 AM                                                                                                                                        Informed consent for procedure was personally discussed, reviewed, and signed by Dr Danielle Metz  Discussion by Dr Danielle Metz was carried out regarding risks, benefits, and alternatives with the patient  Risks include but are not limited to:  bleeding, infection, and delayed wound healing or an open wound, pulmonary embolus, leaks from bowel or bile ducts or other viscus, transfusions, death  Discussed in further detail the more common complications and their rates of occurrence   was used if necessary  Patient expressed understanding of the issues discussed and wished/consented to proceed  All questions were answered by Dr Danielle Metz  Discussion performed between patient and the provider signing below  Signature:   Roselia Rg    Date: 1/22/2019 Time: 8:54 AM           Some portions of this record may have been generated with voice recognition software  There may be translation, syntax,  or grammatical errors  Occasional wrong word or "sound-a-like" substitutions may have occurred due to the inherent limitations of the voice recognition software  Read the chart carefully and recognize, using context, where substitutions may have occurred  If you have any questions, please contact the dictating provider for clarification or correction, as needed  This encounter has been coded by a non-certified coder

## 2019-01-23 PROBLEM — Z12.11 COLON CANCER SCREENING: Status: ACTIVE | Noted: 2019-01-23

## 2019-02-04 ENCOUNTER — ANESTHESIA EVENT (OUTPATIENT)
Dept: GASTROENTEROLOGY | Facility: HOSPITAL | Age: 59
End: 2019-02-04
Payer: COMMERCIAL

## 2019-02-05 ENCOUNTER — HOSPITAL ENCOUNTER (OUTPATIENT)
Facility: HOSPITAL | Age: 59
Setting detail: OUTPATIENT SURGERY
Discharge: HOME | End: 2019-02-05
Attending: SURGERY | Admitting: SURGERY
Payer: COMMERCIAL

## 2019-02-05 ENCOUNTER — ANESTHESIA (OUTPATIENT)
Dept: GASTROENTEROLOGY | Facility: HOSPITAL | Age: 59
End: 2019-02-05
Payer: COMMERCIAL

## 2019-02-05 VITALS
HEIGHT: 66 IN | BODY MASS INDEX: 27.37 KG/M2 | WEIGHT: 170.3 LBS | SYSTOLIC BLOOD PRESSURE: 123 MMHG | TEMPERATURE: 98.1 F | OXYGEN SATURATION: 98 % | HEART RATE: 77 BPM | DIASTOLIC BLOOD PRESSURE: 71 MMHG | RESPIRATION RATE: 20 BRPM

## 2019-02-05 PROCEDURE — G0121 COLON CA SCRN NOT HI RSK IND: HCPCS | Performed by: SURGERY

## 2019-02-05 RX ORDER — PROPOFOL 10 MG/ML
INJECTION, EMULSION INTRAVENOUS AS NEEDED
Status: DISCONTINUED | OUTPATIENT
Start: 2019-02-05 | End: 2019-02-05 | Stop reason: SURG

## 2019-02-05 RX ORDER — SODIUM CHLORIDE 9 MG/ML
125 INJECTION, SOLUTION INTRAVENOUS CONTINUOUS
Status: DISCONTINUED | OUTPATIENT
Start: 2019-02-05 | End: 2019-02-05 | Stop reason: HOSPADM

## 2019-02-05 RX ADMIN — PROPOFOL 30 MG: 10 INJECTION, EMULSION INTRAVENOUS at 11:27

## 2019-02-05 RX ADMIN — PROPOFOL 20 MG: 10 INJECTION, EMULSION INTRAVENOUS at 11:30

## 2019-02-05 RX ADMIN — PROPOFOL 20 MG: 10 INJECTION, EMULSION INTRAVENOUS at 11:34

## 2019-02-05 RX ADMIN — PROPOFOL 70 MG: 10 INJECTION, EMULSION INTRAVENOUS at 11:23

## 2019-02-05 RX ADMIN — PROPOFOL 30 MG: 10 INJECTION, EMULSION INTRAVENOUS at 11:24

## 2019-02-05 RX ADMIN — PROPOFOL 30 MG: 10 INJECTION, EMULSION INTRAVENOUS at 11:29

## 2019-02-05 RX ADMIN — PROPOFOL 20 MG: 10 INJECTION, EMULSION INTRAVENOUS at 11:35

## 2019-02-05 RX ADMIN — LIDOCAINE HYDROCHLORIDE 20 MG: 20 INJECTION, SOLUTION INTRAVENOUS at 11:23

## 2019-02-05 RX ADMIN — PROPOFOL 30 MG: 10 INJECTION, EMULSION INTRAVENOUS at 11:32

## 2019-02-05 RX ADMIN — PROPOFOL 20 MG: 10 INJECTION, EMULSION INTRAVENOUS at 11:25

## 2019-02-05 RX ADMIN — SODIUM CHLORIDE 125 ML/HR: 0.9 INJECTION, SOLUTION INTRAVENOUS at 10:38

## 2019-02-05 NOTE — H&P (VIEW-ONLY)
Assessment/Plan:   Salvador Richard is a 59 y  o female who is here for a:     Screening Colonoscopy  Plan: Colonoscopy for: Screening for Colon Cancer        Previous Colonoscopy and  Location of polyps if any:   10 years ago and  with polyps in the :   no polyps or not applicable           Preoperative Clearance: None        ______________________________________________________    HPI:  Salvador Richard is a 59 y  o female who was referred for evaluation of    Screening  Currently:     Symptoms include:  no abdominal pain, change in bowel habits, or black or bloody stools  Family history of colon cancer: None reported             Anticoagulation: none    LABS:      Lab Results   Component Value Date    WBC 4 85 10/07/2017    HGB 14 9 10/07/2017    HCT 44 0 10/07/2017    MCV 92 10/07/2017     10/07/2017     Lab Results   Component Value Date     12/19/2015    K 4 2 12/10/2018     12/10/2018    CO2 25 12/10/2018    ANIONGAP 9 12/19/2015    BUN 12 12/10/2018    CREATININE 0 78 06/11/2018    GLUCOSE 87 12/19/2015    GLUF 72 06/11/2018    CALCIUM 9 1 12/10/2018    AST 27 06/11/2018    ALT 33 06/11/2018    ALKPHOS 88 12/10/2018    PROT 7 5 12/19/2015    BILITOT 0 41 12/19/2015    EGFR 83 06/11/2018     No results found for: HGBA1C  No results found for: INR, PROTIME      No orders to display           ROS:  General ROS: negative for - chills, fatigue, fever or night sweats, weight loss  Respiratory ROS: no cough, shortness of breath, or wheezing  Cardiovascular ROS: no chest pain or dyspnea on exertion  Genito-Urinary ROS: no dysuria, trouble voiding, or hematuria  Musculoskeletal ROS: negative for - gait disturbance, joint pain or muscle pain  Neurological ROS: no TIA or stroke symptoms  GI ROS: see HPI  Skin ROS: no new rashes or lesions   Lymphatic ROS: no new adenopathy noted by pt     GYN ROS: see HPI, no new GYN history or bleeding noted  Psy ROS: no new mental or behavioral disturbances           Imaging: No new pertinent imaging studies           Patient Active Problem List   Diagnosis    Depressive disorder    Chronic cough    Hyperlipidemia    Insomnia    Gastroesophageal reflux disease    Sacroiliitis (HCC)         Allergies:  Amoxicillin-pot clavulanate      Current Outpatient Prescriptions:     B Complex-C CAPS, Take by mouth, Disp: , Rfl:     Biotin 1 MG CAPS, BID, Disp: , Rfl:     Calcium Carb-Cholecalciferol (CALCIUM 1000 + D PO), 1,000 mg BID, Disp: , Rfl:     escitalopram (LEXAPRO) 10 mg tablet, TAKE 1 TABLET DAILY, Disp: 90 tablet, Rfl: 1    esomeprazole (NexIUM) 10 MG packet, Take by mouth, Disp: , Rfl:     Fish Oil-Krill Oil (KRILL OIL PLUS) CAPS, Take by mouth, Disp: , Rfl:     levothyroxine 88 mcg tablet, TAKE 1 TABLET DAILY, Disp: 90 tablet, Rfl: 0    Multiple Vitamins-Minerals (CENTRUM SILVER ADULT 50+ PO), Take by mouth, Disp: , Rfl:     phenazopyridine (PYRIDIUM) 200 mg tablet, Take 1 tablet (200 mg total) by mouth 3 (three) times a day with meals, Disp: 21 tablet, Rfl: 0    albuterol (PROAIR HFA) 90 mcg/act inhaler, Inhale 2 puffs, Disp: , Rfl:     ferrous sulfate 325 (65 Fe) mg tablet, Take 325 mg by mouth, Disp: , Rfl:     Lactobacillus (BIOTINEX) CAPS, Take 1 capsule by mouth daily, Disp: , Rfl:     Past Medical History:   Diagnosis Date    Hiatal hernia     resolved 12/7/16    Post herpetic neuralgia     last assessed 7/13/16, resolved 12/7/16       Past Surgical History:   Procedure Laterality Date    EAR SURGERY      GASTRIC BYPASS      HYSTERECTOMY         Family History   Problem Relation Age of Onset    Diabetes Mother     Hodgkin's lymphoma Mother     Breast cancer Sister     Stroke Father        Social History     Social History    Marital status: /Civil Union     Spouse name: N/A    Number of children: N/A    Years of education: N/A     Social History Main Topics    Smoking status: Former Smoker    Smokeless tobacco: Never Used    Alcohol use Yes      Comment: SOCIAL    Drug use: No    Sexual activity: Yes     Partners: Male     Other Topics Concern    None     Social History Narrative    Always uses seat belt     Feels safe at home        Exam:   Vitals:    01/22/19 0844   BP: 138/62   Pulse: (!) 109   Resp: 16   Temp: 98 2 °F (36 8 °C)           ______________________________________________________    PHYSICAL EXAM  General Appearance:    Alert, cooperative, no distress, healthy     Head:    Normocephalic without obvious abnormality   Eyes:    PERRL, conjunctiva/corneas clear, EOM's intact        Neck:   Supple, no adenopathy, no JVD   Back:     Symmetric, no spinal or CVA tenderness   Lungs:     Clear to auscultation bilaterally, no wheezing or rhonchi   Heart:    Regular rate and rhythm, S1 and S2 normal, no murmur   Abdomen:     Soft, NTND, +BS   Extremities:   Extremities normal  No clubbing, cyanosis or edema   Psych:   Normal Affect   Neurologic:   CNII-XII intact  Strength symmetric, speech intact                 Teola Junior, PA-C  Date: 1/22/2019 Time: 8:54 AM       This report has been generated by a voice recognition software system  Therefore, there may be syntax, spelling, and/or grammatical errors  Please call if you've any questions  This  encounter has been billed by a non certified Coder  Informed consent for procedure was personally discussed, reviewed, and signed by Dr Jose David Carey  Discussion by Dr Jose David Carey was carried out regarding risks, benefits, and alternatives with the patient  Risks include but are not limited to:  bleeding, infection, and delayed wound healing or an open wound, pulmonary embolus, leaks from bowel or bile ducts or other viscus, transfusions, death  Discussed in further detail the more common complications and their rates of occurrence   was used if necessary    Patient expressed understanding of the issues discussed and wished/consented to proceed  All questions were answered by Dr Aaron Hazel  Discussion performed between patient and the provider signing below  Signature:   [facility]  Date: 1/22/2019 Time: 8:54 AM                                                                                                                                        Informed consent for procedure was personally discussed, reviewed, and signed by Dr Aaron Hazel  Discussion by Dr Aaron Hazel was carried out regarding risks, benefits, and alternatives with the patient  Risks include but are not limited to:  bleeding, infection, and delayed wound healing or an open wound, pulmonary embolus, leaks from bowel or bile ducts or other viscus, transfusions, death  Discussed in further detail the more common complications and their rates of occurrence   was used if necessary  Patient expressed understanding of the issues discussed and wished/consented to proceed  All questions were answered by Dr Aaron Hazel  Discussion performed between patient and the provider signing below  Signature:   [facility]    Date: 1/22/2019 Time: 8:54 AM           Some portions of this record may have been generated with voice recognition software  There may be translation, syntax,  or grammatical errors  Occasional wrong word or "sound-a-like" substitutions may have occurred due to the inherent limitations of the voice recognition software  Read the chart carefully and recognize, using context, where substitutions may have occurred  If you have any questions, please contact the dictating provider for clarification or correction, as needed  This encounter has been coded by a non-certified coder

## 2019-02-05 NOTE — ANESTHESIA PREPROCEDURE EVALUATION
Review of Systems/Medical History  Patient summary reviewed  Chart reviewed  No history of anesthetic complications     Cardiovascular  Hyperlipidemia,    Pulmonary       GI/Hepatic    GERD well controlled,  Hiatal hernia, Bowel prep            Endo/Other     GYN       Hematology   Musculoskeletal  Back pain (SI pain) , lumbar pain,   Arthritis     Neurology   Psychology   Depression , being treated for depression,              Physical Exam    Airway    Mallampati score: II  TM Distance: >3 FB  Neck ROM: full     Dental   No notable dental hx     Cardiovascular  Rhythm: regular, Rate: normal, Cardiovascular exam normal    Pulmonary  Pulmonary exam normal Breath sounds clear to auscultation,     Other Findings        Anesthesia Plan  ASA Score- 2     Anesthesia Type- IV sedation with anesthesia with ASA Monitors  Additional Monitors:   Airway Plan:         Plan Factors-Patient not instructed to abstain from smoking on day of procedure  Patient did not smoke on day of surgery  Induction- intravenous  Postoperative Plan-     Informed Consent- Anesthetic plan and risks discussed with patient and daughter  I personally reviewed this patient with the CRNA  Discussed and agreed on the Anesthesia Plan with the CRNA  Bryant Jenkins

## 2019-02-05 NOTE — OP NOTE
/COLONOSCOPY  Postoperative Note  PATIENT NAME: Chinyere Thurman  : 1960  MRN: [id number]  [facility] ROOM 01    Surgery Date: 2019    Pre operative diagnosis:  Colon cancer screening [Z12 11]    Operative Indications:  Due for Colonoscopy :    Previous Colonoscopy if any, and  Location of polyps if any:   none          Consent:  The risks, benefits, and alternatives to the surgery were discussed with the patient and with the family prior to surgery if available, personally by Dr Deneen Sánchez  If the consent was obtained by the physician assistant or other representative, the consent was reviewed once again personally by the operating physician  Common complications particular for this procedure as well as unusual complications were discussed, including but not limited to:  bleeding, wound infection, prolonged wound healing, open wounds, reoperation, leak from the bowel or viscus, leak from the bile duct or injury to adjacent or other organs or blood vessels in the abdomen, and possible surgery or reoperation  If the surgery was laparoscopic, it was discussed that possible open surgery could also occur during that same surgery and is always an option in laparoscopic surgery  A  was used if necessary  The patient expressed understanding of the issues discussed and wished and consented to the procedure to proceed  All questions were answered  Dr Deneen Sánchez personally discussed the informed consent with this patient  Post-Operative Diagnosis and Findings:   Diverticulosis and Hemorrhoids       Procedure:   Procedure(s):  COLONOSCOPY            Surgeon(s) and Role:     * Gerard Wallace MD - Primary  I was present for the entire procedure  Specimens:  * No specimens in log *    Estimated Blood Loss:   Minimal    Anesthesia Type:   Choice     Procedure: The patient was seen in the Holding Room   The risks, benefits, complications, treatment options, and expected outcomes were discussed with the patient  The possibilities of reaction to medication, pulmonary aspiration, perforation of viscus, bleeding, recurrent infection, the need for additional procedures, failure to diagnose a condition, and creating a complication requiring transfusion or operation were discussed with the patient  The patient concurred with the proposed plan, giving informed consent  The patient was taken to Endoscopy Suite, identified as [facility]  Staff confirmed patient name, , and procedure  A Time Out was held and the above information confirmed  A visual and digital exam was carried out of the anus and anal canal   Findings were normal unless specified below  The colonoscope was passed per anus and traversed to the cecum  The cecum was clearly visualized in the right lower quadrant by light reflex and palpation  The scope was withdrawn  There were no mucosal lesions or polyps seen throughout the colon  There were diverticula scattered throughout the sigmoid colon and grade 1 and 2 hemorrhoids  A photograph was taken  The scope was retroflexed  There were no anorectal lesions other than the hemorrhoids  The scope was removed  The patient tolerated the procedure well  Withdrawal time was greater than 7 minutes  Prep was graded based on the [place] Bowel Preparation grading scale:     Cecum/Right colon: 2  Minor residual staining or residual fragments but muscoa seen well  Transverse/Hepatic flexure/Splenic Flexure: 2  Minor residual staining or residual fragments but muscoa seen well  Left/Sigmoid/Rectum: 2  Minor residual staining or residual fragments but muscoa seen well  The bowel preparation was: Good/Adequate      Some portions of this record may have been generated with voice recognition software  There may be translation, syntax,  or grammatical errors   Occasional wrong word or "sound-a-like" substitutions may have occurred due to the inherent limitations of the voice recognition software  Read the chart carefully and recognize, using context, where substations may have occurred  If you have any questions, please contact the dictating provider for clarification or correction, as needed  Complications: None    EBL: <- 0 5cc cc    Condition: Stable to PACU      Follow up endoscopy: Follow-up colonoscopy timing is difficult to predict without pathology and is not an exact science  Patients are told to follow up earlier than recommended if they have any symptoms or GI changes  However it is required that we predict possible endoscopy follow-up at the time of this procedure  Based on clinical appearance, follow-up colonoscopy is estimated to be recommended  in:  10 years for normal screening follow-up, or very small hyperplastic appearing polyps         SIGNATURE: Harris Bey MD   DATE: February 5, 2019   TIME: 11:38 AM

## 2019-02-05 NOTE — DISCHARGE INSTRUCTIONS
[facility]  Endoscopy Post-Operative Instructions  Dr Allie Salmeron MD, FACS    Procedure: Colonoscopy    Findings:  Diverticulosis and Hemorrhoids    Follow-Up: You will need a repeat Endoscopy in (generally)10 years  You should have an endoscopy sooner than recommended if you have any symptoms of bleeding or change in stools or other concerns  You will receive a call from our office with your results, in addition to the the preliminary results you received today  You will usually receive a follow-up letter from our office in 1-2 weeks  Call the office if you do not hear from us  You are welcome to also schedule an office visit if desired to discuss the results further  It is your responsibility to contact our office for results in 1- 2 weeks if you do not hear from us  If a follow up endoscopy is needed, you are responsible for arranging that follow up appointment at the appropriate time  The office may or may not issue a reminder at that future time  Please take responsibility for your own follow up healthcare  Diet: Eat a light snack first, and then resume your previous diet  Discharge Medications:  See after visit summary for reconciled discharge medications provided to patient and family  Current Facility-Administered Medications:     sodium chloride 0 9 % infusion, 125 mL/hr, Intravenous, Continuous, Shahnaz Ricks DO, Last Rate: 125 mL/hr at 02/05/19 1038, 125 mL/hr at 02/05/19 1038  Do not take aspirin or blood thinning medications for 2 days following procedure  Tylenol is okay  You may have been given a new medication  Please take this (usually an anti-ulcer -type medication) and see your primary care doctor for refills and follow up medications if needed       After the test: Notify physician for arm swelling or pain at the intravenous site  You may have some abdominal discomfort following the procedure   Belching or passing flatus will help relieve it  Following upper endoscopy, you may experience a temporary sore throat  Saline gargles or lozenges can be taken to relieve sore throat Following lower endoscopy, minor rectal bleeding is not uncommon      Activity: Do not drive a car, operate machinery, or sign legal documents for 24 hours after your procedure  Normal activity may be resumed on the day following the procedure      Call the office at [phone] for any of the following: Severe abdominal pain, significant rectal bleeding, chills, or fever above 100°, new onset of persistent cough or persistent vomiting      Emmie Kevin  [address] [place] [address], [address] [facility]  Phone: [phone]                    Diverticulosis   WHAT YOU NEED TO KNOW:   Diverticulosis is a condition that causes small pockets called diverticula to form in your intestine  These pockets make it difficult for bowel movements to pass through your digestive system  DISCHARGE INSTRUCTIONS:   Seek care immediately if:   · You have severe pain on the left side of your lower abdomen  · Your bowel movements are bright or dark red  Contact your healthcare provider if:   · You have a fever and chills  · You feel dizzy or lightheaded  · You have nausea, or you are vomiting  · You have a change in your bowel movements  · You have questions or concerns about your condition or care  Medicines:   · Medicines  to soften your bowel movements may be given  You may also need medicines to treat symptoms such as bloating and pain  · Take your medicine as directed  Contact your healthcare provider if you think your medicine is not helping or if you have side effects  Tell him or her if you are allergic to any medicine  Keep a list of the medicines, vitamins, and herbs you take  Include the amounts, and when and why you take them  Bring the list or the pill bottles to follow-up visits   Carry your medicine list with you in case of an emergency  Self-care: The goal of treatment is to manage any symptoms you have and prevent other problems such as diverticulitis  Diverticulitis is swelling or infection of the diverticula  Your healthcare provider may recommend any of the following:  · Eat a variety of high-fiber foods  High-fiber foods help you have regular bowel movements  High-fiber foods include cooked beans, fruits, vegetables, and some cereals  Most adults need 25 to 35 grams of fiber each day  Your healthcare provider may recommend that you have more  Ask your healthcare provider how much fiber you need  Increase fiber slowly  You may have abdominal discomfort, bloating, and gas if you add fiber to your diet too quickly  You may need to take a fiber supplement if you are not getting enough fiber from food  · Drink liquids as directed  You may need to drink 2 to 3 liters (8 to 12 cups) of liquids every day  Ask your healthcare provider how much liquid to drink each day and which liquids are best for you  · Apply heat  on your abdomen for 20 to 30 minutes every 2 hours for as many days as directed  Heat helps decrease pain and muscle spasms  Help prevent diverticulitis or other symptoms: The following may help decrease your risk for diverticulitis or symptoms, such as bleeding  Talk to your provider about these or other things you can do to prevent problems that may occur with diverticulosis  · Exercise regularly  Ask your healthcare provider about the best exercise plan for you  Exercise can help you have regular bowel movements  Get 30 minutes of exercise on most days of the week  · Maintain a healthy weight  Ask your healthcare provider how much you should weigh  Ask him or her to help you create a weight loss plan if you are overweight  · Do not smoke  Nicotine and other chemicals in cigarettes increase your risk for diverticulitis   Ask your healthcare provider for information if you currently smoke and need help to quit  E-cigarettes or smokeless tobacco still contain nicotine  Talk to your healthcare provider before you use these products  · Ask your healthcare provider if it is safe to take NSAIDs  NSAIDs may increase your risk of diverticulitis  Follow up with your healthcare provider as directed:  Write down your questions so you remember to ask them during your visits  © 2017 [address] Information is for End User's use only and may not be sold, redistributed or otherwise used for commercial purposes  All illustrations and images included in CareNotes® are the copyrighted property of [employer]  or Julien Campos  The above information is an  only  It is not intended as medical advice for individual conditions or treatments  Talk to your doctor, nurse or pharmacist before following any medical regimen to see if it is safe and effective for you  Diverticulosis Diet   WHAT YOU NEED TO KNOW:   What is a diverticulosis diet? A diverticulosis diet includes high-fiber foods  High-fiber foods help you have regular bowel movements  Extra fiber may decrease your risk of forming new diverticula (small pockets) in your intestine  A high-fiber diet may also help prevent diverticulitis  Diverticulitis is a painful condition that occurs when diverticula become inflamed or infected  You do not need to avoid nuts, seeds, corn, or popcorn while you are on a diverticulosis diet  How much fiber do I need? You may need 25 to 35 grams of fiber each day  Ask your dietitian or healthcare provider how much fiber you should have  Increase your intake of fiber slowly  When you eat more fiber, you may have gas and feel bloated  You may need to take a fiber supplement if you do not get enough fiber from food  Drink plenty of liquids as you increase the fiber in your diet  Your dietitian or healthcare provider may recommend 8 eight-ounce cups or more each day   Ask which liquids are best for you  Which foods are high in fiber? · Foods with at least 4 grams of fiber per serving:      ¨ ? to ½ cup of high-fiber cereal (check the nutrition label on the box)    ¨ ½ cup of blackberries or raspberries    ¨ 4 dried prunes    ¨ 1 cooked artichoke    ¨ ½ cup of cooked legumes, such as lentils, or red, kidney, and hewitt beans    · Foods with 1 to 3 grams of fiber per serving:      ¨ 1 slice of whole-wheat, pumpernickel, or rye bread    ¨ 4 whole-wheat crackers    ¨ ½ cup of cereal with 1 to 3 grams of fiber per serving (check the nutrition label on the box)    ¨ 1 piece of fruit, such as an apple, banana, pear, kiwi, or orange    ¨ 3 dates    ¨ ½ cup of canned apricots, fruit cocktail, peaches, or pears    ¨ ½ cup of raw or cooked vegetables, such as carrots, cauliflower, cabbage, spinach, squash, or corn  When should I contact my healthcare provider? · You have questions about a high-fiber diet  · You have a change in your bowel movements  · You have an upset stomach  · You have a fever  · You have pain in your lower abdomen on the left side  · You have questions about your condition or care  CARE AGREEMENT:   You have the right to help plan your care  Learn about your health condition and how it may be treated  Discuss treatment options with your caregivers to decide what care you want to receive  You always have the right to refuse treatment  The above information is an  only  It is not intended as medical advice for individual conditions or treatments  Talk to your doctor, nurse or pharmacist before following any medical regimen to see if it is safe and effective for you  © 2017 [address] Information is for End User's use only and may not be sold, redistributed or otherwise used for commercial purposes   All illustrations and images included in CareNotes® are the copyrighted property of A D A M , Inc  or [employer]  Hemorrhoids   WHAT YOU NEED TO KNOW:   Hemorrhoids are swollen blood vessels inside your rectum (internal hemorrhoids) or on your anus (external hemorrhoids)  Sometimes a hemorrhoid may prolapse  This means it extends out of your anus  DISCHARGE INSTRUCTIONS:   Seek care immediately if:   · You have severe pain in your rectum or around your anus  · You have severe pain in your abdomen and you are vomiting  · You have bleeding from your anus that soaks through your underwear  Contact your healthcare provider if:   · You have frequent and painful bowel movements  · Your hemorrhoid looks or feels more swollen than usual      · You do not have a bowel movement for 2 days or more  · You see or feel tissue coming through your anus  · You have questions or concerns about your condition or care  Medicines: You may  need any of the following:  · Medicine  may be given to decrease pain, swelling, and itching  The medicine may come as a pad, cream, or ointment  · Stool softeners  help treat or prevent constipation  · NSAIDs , such as ibuprofen, help decrease swelling, pain, and fever  NSAIDs can cause stomach bleeding or kidney problems in certain people  If you take blood thinner medicine, always ask your healthcare provider if NSAIDs are safe for you  Always read the medicine label and follow directions  · Take your medicine as directed  Contact your healthcare provider if you think your medicine is not helping or if you have side effects  Tell him or her if you are allergic to any medicine  Keep a list of the medicines, vitamins, and herbs you take  Include the amounts, and when and why you take them  Bring the list or the pill bottles to follow-up visits  Carry your medicine list with you in case of an emergency  Manage your symptoms:   · Apply ice on your anus for 15 to 20 minutes every hour or as directed  Use an ice pack, or put crushed ice in a plastic bag  Cover it with a towel before you apply it to your anus  Ice helps prevent tissue damage and decreases swelling and pain  · Take a sitz bath  Fill a bathtub with 4 to 6 inches of warm water  You may also use a sitz bath pan that fits inside a toilet bowl  Sit in the sitz bath for 15 minutes  Do this 3 times a day, and after each bowel movement  The warm water can help decrease pain and swelling  · Keep your anal area clean  Gently wash the area with warm water daily  Soap may irritate the area  After a bowel movement, wipe with moist towelettes or wet toilet paper  Dry toilet paper can irritate the area  Prevent hemorrhoids:   · Do not strain to have a bowel movement  Do not sit on the toilet too long  These actions can increase pressure on the tissues in your rectum and anus  · Drink plenty of liquids  Liquids can help prevent constipation  Ask how much liquid to drink each day and which liquids are best for you  · Eat a variety of high-fiber foods  Examples include fruits, vegetables, and whole grains  Ask your healthcare provider how much fiber you need each day  You may need to take a fiber supplement  · Exercise as directed  Exercise, such as walking, may make it easier to have a bowel movement  Ask your healthcare provider to help you create an exercise plan  · Do not have anal sex  Anal sex can weaken the skin around your rectum and anus  · Avoid heavy lifting  This can cause straining and increase your risk for another hemorrhoid  Follow up with your healthcare provider as directed:  Write down your questions so you remember to ask them during your visits  © 2017 [address] [facility] Information is for End User's use only and may not be sold, redistributed or otherwise used for commercial purposes  All illustrations and images included in CareNotes® are the copyrighted property of [employer] D [employer] M , Inc  or Julien Campos    The above information is an  only  It is not intended as medical advice for individual conditions or treatments  Talk to your doctor, nurse or pharmacist before following any medical regimen to see if it is safe and effective for you  Colonoscopy   WHAT YOU NEED TO KNOW:   A colonoscopy is a procedure to examine the inside of your colon (intestine) with a scope  Polyps or tissue growths may have been removed during your colonoscopy  It is normal to feel bloated and to have some abdominal discomfort  You should be passing gas  If you have hemorrhoids or you had polyps removed, you may have a small amount of bleeding  DISCHARGE INSTRUCTIONS:   Seek care immediately if:   · You have a large amount of bright red blood in your bowel movements  · Your abdomen is hard and firm and you have severe pain  · You have sudden trouble breathing  Contact your healthcare provider if:   · You develop a rash or hives  · You have a fever within 24 hours of your procedure       · You have not had a bowel movement for 3 days after your procedure  · You have questions or concerns about your condition or care  Activity:   · Do not lift, strain, or run  for 3 days after your procedure  · Rest after your procedure  You have been given medicine to relax you  Do not  drive or make important decisions until the day after your procedure  Return to your normal activity as directed  · Relieve gas and discomfort from bloating  by lying on your right side with a heating pad on your abdomen  You may need to take short walks to help the gas move out  Eat small meals until bloating is relieved  If you had polyps removed: For 7 days after your procedure:  · Do not  take aspirin  · Do not  go on long car rides  Follow up with your healthcare provider as directed:  Write down your questions so you remember to ask them during your visits     © 2017 [address] is for End User's use only and may not be sold, redistributed or otherwise used for commercial purposes  All illustrations and images included in CareNotes® are the copyrighted property of [employer]  or Julien Campos  The above information is an  only  It is not intended as medical advice for individual conditions or treatments  Talk to your doctor, nurse or pharmacist before following any medical regimen to see if it is safe and effective for you

## 2019-02-05 NOTE — DISCHARGE SUMMARY
Discharge Summary - [place] 59 y o  female MRN: [id number]    Unit/Bed#: [facility] Encounter: [id number]      Pre-Operative Diagnosis: Pre-Op Diagnosis Codes:     * Colon cancer screening [Z12 11]    Post-Operative Diagnosis: Post-Op Diagnosis Codes:     * Colon cancer screening [Z12 11]     * Diverticulosis [K57 90]    Procedures Performed:  Procedure(s):  COLONOSCOPY    Surgeon: Mauri Pacheco MD    See H & P for full details of admission and Operative Note for full details of operations performed  Patient was seen and examined prior to discharge  Provisions for Follow-Up Care:  See After Visit Summary for information related to follow-up care and home orders  Disposition: Home, in stable condition  Planned Readmission: No    Discharge Medications:  See after visit summary for reconciled discharge medications provided to patient and family  Post Operative instructions: Reviewed with patient and/or family  Some portions of this record may have been generated with voice recognition software  There may be translation, syntax,  or grammatical errors  Occasional wrong word or "sound-a-like" substitutions may have occurred due to the inherent limitations of the voice recognition software  Read the chart carefully and recognize, using context, where substitutions may have occurred  If you have any questions, please contact the dictating provider for clarification or correction, as needed  This encounter has been coded by non certified Coder      Signature:   Mauri Pacheco MD  Date: 2/5/2019 Time: 11:39 AM

## 2019-02-06 ENCOUNTER — TELEPHONE (OUTPATIENT)
Dept: SURGERY | Facility: CLINIC | Age: 59
End: 2019-02-06

## 2019-02-06 NOTE — TELEPHONE ENCOUNTER
Tried reaching patient post colonoscopy, no answer  Left message on voicemail for patient to return call

## 2019-02-07 NOTE — TELEPHONE ENCOUNTER
Patient returned call  States she is feeling well after procedure  No F/C/N/V  Has been able to have a BM  Aware no pathology has been sent out and that her recommended follow up is for 10 years  Informed that she will receive a letter with details of procedure  Aware to call office with any concerns

## 2019-02-27 DIAGNOSIS — E03.9 HYPOTHYROIDISM, UNSPECIFIED TYPE: ICD-10-CM

## 2019-02-27 RX ORDER — LEVOTHYROXINE SODIUM 88 UG/1
TABLET ORAL
Qty: 90 TABLET | Refills: 0 | Status: SHIPPED | OUTPATIENT
Start: 2019-02-27 | End: 2019-05-28 | Stop reason: SDUPTHER

## 2019-04-30 ENCOUNTER — TRANSCRIBE ORDERS (OUTPATIENT)
Dept: ADMINISTRATIVE | Facility: HOSPITAL | Age: 59
End: 2019-04-30

## 2019-04-30 DIAGNOSIS — Z12.39 BREAST SCREENING, UNSPECIFIED: Primary | ICD-10-CM

## 2019-05-28 DIAGNOSIS — E03.9 HYPOTHYROIDISM, UNSPECIFIED TYPE: ICD-10-CM

## 2019-05-28 RX ORDER — LEVOTHYROXINE SODIUM 88 UG/1
TABLET ORAL
Qty: 90 TABLET | Refills: 0 | Status: SHIPPED | OUTPATIENT
Start: 2019-05-28 | End: 2019-08-26 | Stop reason: SDUPTHER

## 2019-06-10 ENCOUNTER — OFFICE VISIT (OUTPATIENT)
Dept: FAMILY MEDICINE CLINIC | Facility: CLINIC | Age: 59
End: 2019-06-10
Payer: COMMERCIAL

## 2019-06-10 VITALS
DIASTOLIC BLOOD PRESSURE: 80 MMHG | RESPIRATION RATE: 16 BRPM | WEIGHT: 178 LBS | SYSTOLIC BLOOD PRESSURE: 130 MMHG | HEIGHT: 65 IN | HEART RATE: 99 BPM | BODY MASS INDEX: 29.66 KG/M2 | OXYGEN SATURATION: 98 % | TEMPERATURE: 99.6 F

## 2019-06-10 DIAGNOSIS — K29.00 ACUTE GASTRITIS WITHOUT HEMORRHAGE, UNSPECIFIED GASTRITIS TYPE: Primary | ICD-10-CM

## 2019-06-10 PROCEDURE — 99214 OFFICE O/P EST MOD 30 MIN: CPT | Performed by: FAMILY MEDICINE

## 2019-06-10 PROCEDURE — 3008F BODY MASS INDEX DOCD: CPT | Performed by: FAMILY MEDICINE

## 2019-06-10 PROCEDURE — 93000 ELECTROCARDIOGRAM COMPLETE: CPT | Performed by: FAMILY MEDICINE

## 2019-06-10 RX ORDER — OMEPRAZOLE 20 MG/1
20 CAPSULE, DELAYED RELEASE ORAL
Qty: 30 CAPSULE | Refills: 1 | Status: SHIPPED | OUTPATIENT
Start: 2019-06-10

## 2019-06-18 ENCOUNTER — OFFICE VISIT (OUTPATIENT)
Dept: FAMILY MEDICINE CLINIC | Facility: CLINIC | Age: 59
End: 2019-06-18
Payer: COMMERCIAL

## 2019-06-18 VITALS
HEART RATE: 75 BPM | WEIGHT: 177.2 LBS | RESPIRATION RATE: 17 BRPM | TEMPERATURE: 98.3 F | BODY MASS INDEX: 29.52 KG/M2 | DIASTOLIC BLOOD PRESSURE: 80 MMHG | OXYGEN SATURATION: 98 % | HEIGHT: 65 IN | SYSTOLIC BLOOD PRESSURE: 136 MMHG

## 2019-06-18 DIAGNOSIS — E03.9 HYPOTHYROIDISM, UNSPECIFIED TYPE: ICD-10-CM

## 2019-06-18 DIAGNOSIS — E78.5 HYPERLIPIDEMIA, UNSPECIFIED HYPERLIPIDEMIA TYPE: ICD-10-CM

## 2019-06-18 DIAGNOSIS — F32.A DEPRESSIVE DISORDER: Primary | ICD-10-CM

## 2019-06-18 DIAGNOSIS — K21.9 GASTROESOPHAGEAL REFLUX DISEASE WITHOUT ESOPHAGITIS: ICD-10-CM

## 2019-06-18 PROCEDURE — 99214 OFFICE O/P EST MOD 30 MIN: CPT | Performed by: FAMILY MEDICINE

## 2019-07-05 ENCOUNTER — HOSPITAL ENCOUNTER (OUTPATIENT)
Dept: MAMMOGRAPHY | Facility: MEDICAL CENTER | Age: 59
Discharge: HOME | End: 2019-07-05
Payer: COMMERCIAL

## 2019-07-05 VITALS — HEIGHT: 65 IN | WEIGHT: 177 LBS | BODY MASS INDEX: 29.49 KG/M2

## 2019-07-05 DIAGNOSIS — Z12.39 BREAST SCREENING, UNSPECIFIED: ICD-10-CM

## 2019-07-05 PROCEDURE — 77063 BREAST TOMOSYNTHESIS BI: CPT

## 2019-07-05 PROCEDURE — 77067 SCR MAMMO BI INCL CAD: CPT

## 2019-07-16 DIAGNOSIS — F32.A DEPRESSIVE DISORDER: ICD-10-CM

## 2019-07-18 RX ORDER — ESCITALOPRAM OXALATE 10 MG/1
TABLET ORAL
Qty: 90 TABLET | Refills: 1 | Status: SHIPPED | OUTPATIENT
Start: 2019-07-18 | End: 2020-01-12

## 2019-08-26 DIAGNOSIS — E03.9 HYPOTHYROIDISM, UNSPECIFIED TYPE: ICD-10-CM

## 2019-08-26 RX ORDER — LEVOTHYROXINE SODIUM 88 UG/1
TABLET ORAL
Qty: 90 TABLET | Refills: 4 | Status: SHIPPED | OUTPATIENT
Start: 2019-08-26 | End: 2020-11-18

## 2020-01-12 DIAGNOSIS — F32.A DEPRESSIVE DISORDER: ICD-10-CM

## 2020-01-12 RX ORDER — ESCITALOPRAM OXALATE 10 MG/1
TABLET ORAL
Qty: 30 TABLET | Refills: 0 | Status: SHIPPED | OUTPATIENT
Start: 2020-01-12 | End: 2020-03-10

## 2020-01-22 ENCOUNTER — TELEPHONE (OUTPATIENT)
Dept: FAMILY MEDICINE CLINIC | Facility: CLINIC | Age: 60
End: 2020-01-22

## 2020-01-22 DIAGNOSIS — Z13.1 SCREENING FOR DIABETES MELLITUS: ICD-10-CM

## 2020-01-22 DIAGNOSIS — F32.A DEPRESSIVE DISORDER: Primary | ICD-10-CM

## 2020-01-22 DIAGNOSIS — E03.9 HYPOTHYROIDISM, UNSPECIFIED TYPE: ICD-10-CM

## 2020-01-22 DIAGNOSIS — E78.5 HYPERLIPIDEMIA, UNSPECIFIED HYPERLIPIDEMIA TYPE: ICD-10-CM

## 2020-01-22 DIAGNOSIS — Z13.0 SCREENING FOR IRON DEFICIENCY ANEMIA: ICD-10-CM

## 2020-02-08 ENCOUNTER — APPOINTMENT (OUTPATIENT)
Dept: LAB | Facility: MEDICAL CENTER | Age: 60
End: 2020-02-08
Payer: COMMERCIAL

## 2020-02-08 DIAGNOSIS — E78.5 HYPERLIPIDEMIA, UNSPECIFIED HYPERLIPIDEMIA TYPE: ICD-10-CM

## 2020-02-08 DIAGNOSIS — Z13.0 SCREENING FOR IRON DEFICIENCY ANEMIA: ICD-10-CM

## 2020-02-08 DIAGNOSIS — E03.9 HYPOTHYROIDISM, UNSPECIFIED TYPE: ICD-10-CM

## 2020-02-08 DIAGNOSIS — F32.A DEPRESSIVE DISORDER: ICD-10-CM

## 2020-02-08 DIAGNOSIS — Z13.1 SCREENING FOR DIABETES MELLITUS: ICD-10-CM

## 2020-02-08 LAB
ALBUMIN SERPL BCP-MCNC: 3.6 G/DL (ref 3.5–5)
ALP SERPL-CCNC: 103 U/L (ref 46–116)
ALT SERPL W P-5'-P-CCNC: 41 U/L (ref 12–78)
ANION GAP SERPL CALCULATED.3IONS-SCNC: 7 MMOL/L (ref 4–13)
AST SERPL W P-5'-P-CCNC: 30 U/L (ref 5–45)
BILIRUB SERPL-MCNC: 0.47 MG/DL (ref 0.2–1)
BUN SERPL-MCNC: 10 MG/DL (ref 5–25)
CALCIUM SERPL-MCNC: 9.2 MG/DL (ref 8.3–10.1)
CHLORIDE SERPL-SCNC: 107 MMOL/L (ref 100–108)
CHOLEST SERPL-MCNC: 266 MG/DL (ref 50–200)
CO2 SERPL-SCNC: 26 MMOL/L (ref 21–32)
CREAT SERPL-MCNC: 0.79 MG/DL (ref 0.6–1.3)
ERYTHROCYTE [DISTWIDTH] IN BLOOD BY AUTOMATED COUNT: 12.8 % (ref 11.6–15.1)
EST. AVERAGE GLUCOSE BLD GHB EST-MCNC: 103 MG/DL
GFR SERPL CREATININE-BSD FRML MDRD: 81 ML/MIN/1.73SQ M
GLUCOSE P FAST SERPL-MCNC: 93 MG/DL (ref 65–99)
HBA1C MFR BLD: 5.2 % (ref 4.2–6.3)
HCT VFR BLD AUTO: 46 % (ref 34.8–46.1)
HDLC SERPL-MCNC: 70 MG/DL
HGB BLD-MCNC: 14.8 G/DL (ref 11.5–15.4)
LDLC SERPL CALC-MCNC: 157 MG/DL (ref 0–100)
MCH RBC QN AUTO: 30.2 PG (ref 26.8–34.3)
MCHC RBC AUTO-ENTMCNC: 32.2 G/DL (ref 31.4–37.4)
MCV RBC AUTO: 94 FL (ref 82–98)
PLATELET # BLD AUTO: 318 THOUSANDS/UL (ref 149–390)
PMV BLD AUTO: 9.7 FL (ref 8.9–12.7)
POTASSIUM SERPL-SCNC: 4 MMOL/L (ref 3.5–5.3)
PROT SERPL-MCNC: 7.5 G/DL (ref 6.4–8.2)
RBC # BLD AUTO: 4.9 MILLION/UL (ref 3.81–5.12)
SODIUM SERPL-SCNC: 140 MMOL/L (ref 136–145)
TRIGL SERPL-MCNC: 195 MG/DL
TSH SERPL DL<=0.05 MIU/L-ACNC: 1.78 UIU/ML (ref 0.36–3.74)
WBC # BLD AUTO: 5.5 THOUSAND/UL (ref 4.31–10.16)

## 2020-02-08 PROCEDURE — 36415 COLL VENOUS BLD VENIPUNCTURE: CPT

## 2020-02-08 PROCEDURE — 80061 LIPID PANEL: CPT

## 2020-02-08 PROCEDURE — 84443 ASSAY THYROID STIM HORMONE: CPT

## 2020-02-08 PROCEDURE — 80053 COMPREHEN METABOLIC PANEL: CPT

## 2020-02-08 PROCEDURE — 85027 COMPLETE CBC AUTOMATED: CPT

## 2020-02-08 PROCEDURE — 83036 HEMOGLOBIN GLYCOSYLATED A1C: CPT

## 2020-02-10 ENCOUNTER — OFFICE VISIT (OUTPATIENT)
Dept: FAMILY MEDICINE CLINIC | Facility: CLINIC | Age: 60
End: 2020-02-10
Payer: COMMERCIAL

## 2020-02-10 VITALS
RESPIRATION RATE: 19 BRPM | HEIGHT: 65 IN | BODY MASS INDEX: 29.99 KG/M2 | DIASTOLIC BLOOD PRESSURE: 76 MMHG | SYSTOLIC BLOOD PRESSURE: 120 MMHG | WEIGHT: 180 LBS | TEMPERATURE: 99.5 F | OXYGEN SATURATION: 95 % | HEART RATE: 124 BPM

## 2020-02-10 DIAGNOSIS — E03.9 HYPOTHYROIDISM, UNSPECIFIED TYPE: ICD-10-CM

## 2020-02-10 DIAGNOSIS — F33.0 MILD EPISODE OF RECURRENT MAJOR DEPRESSIVE DISORDER (HCC): Primary | ICD-10-CM

## 2020-02-10 DIAGNOSIS — K21.9 GASTROESOPHAGEAL REFLUX DISEASE WITHOUT ESOPHAGITIS: ICD-10-CM

## 2020-02-10 DIAGNOSIS — E78.5 HYPERLIPIDEMIA, UNSPECIFIED HYPERLIPIDEMIA TYPE: ICD-10-CM

## 2020-02-10 PROCEDURE — 3008F BODY MASS INDEX DOCD: CPT | Performed by: FAMILY MEDICINE

## 2020-02-10 PROCEDURE — 99214 OFFICE O/P EST MOD 30 MIN: CPT | Performed by: FAMILY MEDICINE

## 2020-02-10 PROCEDURE — 1036F TOBACCO NON-USER: CPT | Performed by: FAMILY MEDICINE

## 2020-02-10 NOTE — PROGRESS NOTES
Assessment/Plan:   1  Mild episode of recurrent major depressive disorder (HCC)  Patient's mood appears stable today  At this time, will continue with her current treatment of Lexapro  2  Hyperlipidemia, unspecified hyperlipidemia type  Appears elevated  Patient's LDL as well as her triglycerides were moderately elevated  She must maintain a very strict low-fat/low-cholesterol diet  If her cholesterol remains elevated at her next visit, she was advised that she will likely need to start statin treatment  3  Hypothyroidism, unspecified type  Reviewed patient's symptoms  She appears asymptomatic  Thyroid function testing has been stable  Will continue with her current dose of levothyroxine  4  Gastroesophageal reflux disease without esophagitis  Stable today  Continue with dietary trigger avoidance as well as her current treatment with omeprazole  Follow-up in 6 months           There are no diagnoses linked to this encounter  Subjective:       Chief Complaint   Patient presents with    Follow-up     6 month, BW review       Patient ID: Luis Manuel Gordillo is a 62 y o  female  Patient is a 12-year-old female presents today for follow-up on chronic conditions  She has major depressive disorder, dyslipidemia, hypothyroidism as well as GERD  She has been taking her medications regularly  She denies adverse reactions with her medications  Recently had blood work completed today and would like to review were salt  Review of Systems   Constitutional: Negative for activity change, chills, fatigue and fever  HENT: Negative for congestion, ear pain, sinus pressure and sore throat  Eyes: Negative for redness, itching and visual disturbance  Respiratory: Negative for cough and shortness of breath  Cardiovascular: Negative for chest pain and palpitations  Gastrointestinal: Negative for abdominal pain, diarrhea and nausea  Endocrine: Negative for cold intolerance and heat intolerance  Genitourinary: Negative for dysuria, flank pain and frequency  Musculoskeletal: Negative for arthralgias, back pain, gait problem and myalgias  Skin: Negative for color change  Allergic/Immunologic: Negative for environmental allergies  Neurological: Negative for dizziness, numbness and headaches  Psychiatric/Behavioral: Negative for behavioral problems and sleep disturbance  The following portions of the patient's history were reviewed and updated as appropriate : past family history, past medical history, past social history and past surgical history  Current Outpatient Medications:     B Complex-C CAPS, Take by mouth, Disp: , Rfl:     Biotin 1 MG CAPS, BID, Disp: , Rfl:     Calcium Carb-Cholecalciferol (CALCIUM 1000 + D PO), 1,000 mg BID, Disp: , Rfl:     escitalopram (LEXAPRO) 10 mg tablet, TAKE 1 TABLET DAILY, Disp: 30 tablet, Rfl: 0    Fish Oil-Krill Oil (KRILL OIL PLUS) CAPS, Take by mouth, Disp: , Rfl:     levothyroxine 88 mcg tablet, TAKE 1 TABLET DAILY, Disp: 90 tablet, Rfl: 4    Multiple Vitamins-Minerals (CENTRUM SILVER ADULT 50+ PO), Take by mouth, Disp: , Rfl:     omeprazole (PriLOSEC) 20 mg delayed release capsule, Take 1 capsule (20 mg total) by mouth daily before breakfast, Disp: 30 capsule, Rfl: 1         Objective:         Vitals:    02/10/20 1121   BP: 120/76   BP Location: Right arm   Patient Position: Sitting   Cuff Size: Adult   Pulse: (!) 124   Resp: 19   Temp: 99 5 °F (37 5 °C)   TempSrc: Tympanic   SpO2: 95%   Weight: 81 6 kg (180 lb)   Height: 5' 4 75" (1 645 m)     Physical Exam   Constitutional: She is oriented to person, place, and time  She appears well-developed and well-nourished  HENT:   Head: Normocephalic and atraumatic  Nose: Nose normal    Mouth/Throat: No oropharyngeal exudate  Eyes: Pupils are equal, round, and reactive to light  Right eye exhibits no discharge  Left eye exhibits no discharge  Neck: Normal range of motion  Neck supple   No tracheal deviation present  Cardiovascular: Normal rate, regular rhythm and intact distal pulses  Exam reveals no gallop and no friction rub  No murmur heard  Pulses:       Dorsalis pedis pulses are 2+ on the right side, and 2+ on the left side  Posterior tibial pulses are 2+ on the right side, and 2+ on the left side  Pulmonary/Chest: Effort normal and breath sounds normal  No respiratory distress  She has no wheezes  She has no rales  Abdominal: Soft  Bowel sounds are normal  She exhibits no distension  There is no tenderness  There is no rebound and no guarding  Musculoskeletal: Normal range of motion  She exhibits no edema  Lymphadenopathy:        Head (right side): No submental and no submandibular adenopathy present  Head (left side): No submental and no submandibular adenopathy present  She has no cervical adenopathy  Right cervical: No superficial cervical, no deep cervical and no posterior cervical adenopathy present  Left cervical: No superficial cervical, no deep cervical and no posterior cervical adenopathy present  Neurological: She is alert and oriented to person, place, and time  No cranial nerve deficit or sensory deficit  Skin: Skin is warm, dry and intact  Psychiatric: Her speech is normal and behavior is normal  Judgment normal  Her mood appears not anxious  Cognition and memory are normal  She does not exhibit a depressed mood  Vitals reviewed

## 2020-03-10 DIAGNOSIS — F32.A DEPRESSIVE DISORDER: ICD-10-CM

## 2020-03-10 RX ORDER — ESCITALOPRAM OXALATE 10 MG/1
TABLET ORAL
Qty: 30 TABLET | Refills: 5 | Status: SHIPPED | OUTPATIENT
Start: 2020-03-10 | End: 2020-09-03

## 2020-07-07 ENCOUNTER — TELEPHONE (OUTPATIENT)
Dept: OBGYN CLINIC | Facility: HOSPITAL | Age: 60
End: 2020-07-07

## 2020-07-07 NOTE — TELEPHONE ENCOUNTER
Patient requested an appointment online for her left wrist and left hip  I called the patient and left a voice mail to call us back if she still needs an appointment  Please schedule when she calls back      Thank you

## 2020-07-24 ENCOUNTER — TELEPHONE (OUTPATIENT)
Dept: FAMILY MEDICINE CLINIC | Facility: CLINIC | Age: 60
End: 2020-07-24

## 2020-07-24 NOTE — TELEPHONE ENCOUNTER
Pt Lm stating she has an upcoming appt and needs BW before  There are 2 orders in from April is this the only BW you are requesting? Pt would like orders faxed to [phone]

## 2020-07-28 ENCOUNTER — APPOINTMENT (OUTPATIENT)
Dept: LAB | Facility: CLINIC | Age: 60
End: 2020-07-28
Payer: COMMERCIAL

## 2020-07-28 DIAGNOSIS — E78.5 HYPERLIPIDEMIA, UNSPECIFIED HYPERLIPIDEMIA TYPE: ICD-10-CM

## 2020-07-28 LAB
ALBUMIN SERPL BCP-MCNC: 3.4 G/DL (ref 3.5–5)
ALP SERPL-CCNC: 103 U/L (ref 46–116)
ALT SERPL W P-5'-P-CCNC: 37 U/L (ref 12–78)
ANION GAP SERPL CALCULATED.3IONS-SCNC: 8 MMOL/L (ref 4–13)
AST SERPL W P-5'-P-CCNC: 31 U/L (ref 5–45)
BILIRUB SERPL-MCNC: 0.34 MG/DL (ref 0.2–1)
BUN SERPL-MCNC: 7 MG/DL (ref 5–25)
CALCIUM SERPL-MCNC: 9.1 MG/DL (ref 8.3–10.1)
CHLORIDE SERPL-SCNC: 106 MMOL/L (ref 100–108)
CHOLEST SERPL-MCNC: 268 MG/DL (ref 50–200)
CO2 SERPL-SCNC: 25 MMOL/L (ref 21–32)
CREAT SERPL-MCNC: 0.72 MG/DL (ref 0.6–1.3)
GFR SERPL CREATININE-BSD FRML MDRD: 90 ML/MIN/1.73SQ M
GLUCOSE P FAST SERPL-MCNC: 67 MG/DL (ref 65–99)
HDLC SERPL-MCNC: 63 MG/DL
LDLC SERPL CALC-MCNC: 130 MG/DL (ref 0–100)
POTASSIUM SERPL-SCNC: 4.1 MMOL/L (ref 3.5–5.3)
PROT SERPL-MCNC: 7.3 G/DL (ref 6.4–8.2)
SODIUM SERPL-SCNC: 139 MMOL/L (ref 136–145)
TRIGL SERPL-MCNC: 376 MG/DL

## 2020-07-28 PROCEDURE — 80061 LIPID PANEL: CPT

## 2020-07-28 PROCEDURE — 36415 COLL VENOUS BLD VENIPUNCTURE: CPT

## 2020-07-28 PROCEDURE — 80053 COMPREHEN METABOLIC PANEL: CPT

## 2020-07-28 NOTE — TELEPHONE ENCOUNTER
LMOM informing patient  Patient to call office back with questions/concerns  Complex Repair And W Plasty Text: The defect edges were debeveled with a #15 scalpel blade.  The primary defect was closed partially with a complex linear closure.  Given the location of the remaining defect, shape of the defect and the proximity to free margins a W plasty was deemed most appropriate for complete closure of the defect.  Using a sterile surgical marker, an appropriate advancement flap was drawn incorporating the defect and placing the expected incisions within the relaxed skin tension lines where possible.    The area thus outlined was incised deep to adipose tissue with a #15 scalpel blade.  The skin margins were undermined to an appropriate distance in all directions utilizing iris scissors.

## 2020-07-30 ENCOUNTER — OFFICE VISIT (OUTPATIENT)
Dept: FAMILY MEDICINE CLINIC | Facility: CLINIC | Age: 60
End: 2020-07-30
Payer: COMMERCIAL

## 2020-07-30 VITALS
HEART RATE: 100 BPM | BODY MASS INDEX: 29.46 KG/M2 | WEIGHT: 176.8 LBS | OXYGEN SATURATION: 98 % | HEIGHT: 65 IN | DIASTOLIC BLOOD PRESSURE: 78 MMHG | TEMPERATURE: 98 F | SYSTOLIC BLOOD PRESSURE: 132 MMHG

## 2020-07-30 DIAGNOSIS — K21.9 GASTROESOPHAGEAL REFLUX DISEASE WITHOUT ESOPHAGITIS: ICD-10-CM

## 2020-07-30 DIAGNOSIS — F32.A DEPRESSIVE DISORDER: ICD-10-CM

## 2020-07-30 DIAGNOSIS — E03.9 HYPOTHYROIDISM, UNSPECIFIED TYPE: ICD-10-CM

## 2020-07-30 DIAGNOSIS — E78.5 HYPERLIPIDEMIA, UNSPECIFIED HYPERLIPIDEMIA TYPE: ICD-10-CM

## 2020-07-30 DIAGNOSIS — M25.532 LEFT WRIST PAIN: Primary | ICD-10-CM

## 2020-07-30 PROCEDURE — 1036F TOBACCO NON-USER: CPT | Performed by: FAMILY MEDICINE

## 2020-07-30 PROCEDURE — 3008F BODY MASS INDEX DOCD: CPT | Performed by: FAMILY MEDICINE

## 2020-07-30 PROCEDURE — 99214 OFFICE O/P EST MOD 30 MIN: CPT | Performed by: FAMILY MEDICINE

## 2020-07-30 RX ORDER — TURMERIC 400 MG
CAPSULE ORAL
COMMUNITY
End: 2021-05-07

## 2020-07-30 RX ORDER — NIACIN 100 MG
TABLET ORAL
COMMUNITY
End: 2022-05-13 | Stop reason: ALTCHOICE

## 2020-07-30 NOTE — PROGRESS NOTES
Assessment/Plan:   1  Left wrist pain  Reviewed patient's symptoms today  At this time, is unclear as to the exact cause of her wrist pain she appears to have moderate pain wrist motion  Will check x-ray to rule out gross abnormalities  She may benefit from wearing a wrist brace  Call patient with her x-ray results  - XR wrist 3+ vw left; Future    2  Depressive disorder  Appears stated at this time, will continue with routine monitoring  Continue as well with current treatment of Lexapro 10 mg daily    3  Hyperlipidemia, unspecified hyperlipidemia type  Reviewed fasting lipid panel with patient today  Her cholesterol levels appear significantly elevated  Triglycerides have increased to 376  She was advised on the harm associated with this elevated level  She states that she has significantly changed her diet over the past few weeks  Will hold off on starting treatment at this time as she has made great changes in her diet and exercise  Will recheck her lipid panel in 3 months  If persistently elevated, she will need to start treatment  - Lipid Panel with Direct LDL reflex; Future    4  Hypothyroidism, unspecified type  Patient appears clinically stable  She denies any recent problems with her thyroid symptoms  Will continue with her current dose of levothyroxine 88 mcg daily  5  Gastroesophageal reflux disease without esophagitis  Stable  Continue with dietary trigger avoidance as well as her current treatment with omeprazole 20 mg daily  Follow up patient 3-4 months  BMI Counseling: Body mass index is 29 65 kg/m²  The BMI is above normal  Nutrition recommendations include decreasing portion sizes, encouraging healthy choices of fruits and vegetables, decreasing fast food intake, consuming healthier snacks and limiting drinks that contain sugar  Exercise recommendations include moderate physical activity 150 minutes/week and exercising 3-5 times per week  No pharmacotherapy was ordered  Diagnoses and all orders for this visit:    Hypothyroidism, unspecified type    Gastroesophageal reflux disease without esophagitis    Depressive disorder    Hyperlipidemia, unspecified hyperlipidemia type    Other orders  -     niacin 100 mg tablet; niacin  -     Turmeric 400 MG CAPS; turmeric          Subjective:       Chief Complaint   Patient presents with    Follow-up     Pt is here for a 6 month follow up  Pt c/o left wrist pain for about 4 weeks  Patient ID: Claudette Conradi is a 56 y o  female  Presents today for follow-up on chronic conditions  She is depression, hyperlipidemia, hypothyroidism as well as GERD  She has been taking her medications regularly  She denies adverse reactions with medications  Wrist Pain    The pain is present in the left wrist  This is a new problem  The current episode started 1 to 4 weeks ago  The problem occurs intermittently  The problem has been unchanged  The pain is at a severity of 5/10  The pain is mild  Associated symptoms include itching  Pertinent negatives include no fever, inability to bear weight, numbness, stiffness or tingling  She has tried nothing for the symptoms  The treatment provided no relief  Review of Systems   Constitutional: Negative for activity change, chills, fatigue and fever  HENT: Negative for congestion, ear pain, sinus pressure and sore throat  Eyes: Negative for redness, itching and visual disturbance  Respiratory: Negative for cough and shortness of breath  Cardiovascular: Negative for chest pain and palpitations  Gastrointestinal: Negative for abdominal pain, diarrhea and nausea  Endocrine: Negative for cold intolerance and heat intolerance  Genitourinary: Negative for dysuria, flank pain and frequency  Musculoskeletal: Negative for arthralgias, back pain, gait problem, myalgias and stiffness  Skin: Positive for itching  Negative for color change     Allergic/Immunologic: Negative for environmental allergies  Neurological: Negative for dizziness, tingling, numbness and headaches  Psychiatric/Behavioral: Negative for behavioral problems and sleep disturbance  The following portions of the patient's history were reviewed and updated as appropriate : past family history, past medical history, past social history and past surgical history  Current Outpatient Medications:     Biotin 1 MG CAPS, BID, Disp: , Rfl:     Calcium Carb-Cholecalciferol (CALCIUM 1000 + D PO), 1,000 mg BID, Disp: , Rfl:     escitalopram (LEXAPRO) 10 mg tablet, TAKE 1 TABLET DAILY, Disp: 30 tablet, Rfl: 5    Fish Oil-Krill Oil (KRILL OIL PLUS) CAPS, Take by mouth, Disp: , Rfl:     levothyroxine 88 mcg tablet, TAKE 1 TABLET DAILY, Disp: 90 tablet, Rfl: 4    Multiple Vitamins-Minerals (CENTRUM SILVER ADULT 50+ PO), Take by mouth, Disp: , Rfl:     niacin 100 mg tablet, niacin, Disp: , Rfl:     omeprazole (PriLOSEC) 20 mg delayed release capsule, Take 1 capsule (20 mg total) by mouth daily before breakfast, Disp: 30 capsule, Rfl: 1    Turmeric 400 MG CAPS, turmeric, Disp: , Rfl:     B Complex-C CAPS, Take by mouth, Disp: , Rfl:          Objective:         Vitals:    07/30/20 0824   BP: 132/78   BP Location: Right arm   Patient Position: Sitting   Cuff Size: Adult   Pulse: 100   Temp: 98 °F (36 7 °C)   TempSrc: Tympanic   SpO2: 98%   Weight: 80 2 kg (176 lb 12 8 oz)   Height: 5' 4 75" (1 645 m)     Physical Exam   Constitutional: She is oriented to person, place, and time  She appears well-developed and well-nourished  HENT:   Head: Normocephalic and atraumatic  Nose: Nose normal    Mouth/Throat: No oropharyngeal exudate  Eyes: Pupils are equal, round, and reactive to light  Right eye exhibits no discharge  Left eye exhibits no discharge  Neck: Normal range of motion  Neck supple  No tracheal deviation present  Cardiovascular: Normal rate, regular rhythm and intact distal pulses   Exam reveals no gallop and no friction rub  No murmur heard  Pulses:       Dorsalis pedis pulses are 2+ on the right side, and 2+ on the left side  Posterior tibial pulses are 2+ on the right side, and 2+ on the left side  Pulmonary/Chest: Effort normal and breath sounds normal  No respiratory distress  She has no wheezes  She has no rales  Abdominal: Soft  Bowel sounds are normal  She exhibits no distension  There is no tenderness  There is no rebound and no guarding  Musculoskeletal: Normal range of motion  She exhibits no edema  Lymphadenopathy:        Head (right side): No submental and no submandibular adenopathy present  Head (left side): No submental and no submandibular adenopathy present  She has no cervical adenopathy  Right cervical: No superficial cervical, no deep cervical and no posterior cervical adenopathy present  Left cervical: No superficial cervical, no deep cervical and no posterior cervical adenopathy present  Neurological: She is alert and oriented to person, place, and time  No cranial nerve deficit or sensory deficit  Skin: Skin is warm, dry and intact  Psychiatric: Her speech is normal and behavior is normal  Judgment normal  Her mood appears not anxious  Cognition and memory are normal  She does not exhibit a depressed mood  Vitals reviewed

## 2020-07-31 ENCOUNTER — TRANSCRIBE ORDERS (OUTPATIENT)
Dept: ADMINISTRATIVE | Facility: HOSPITAL | Age: 60
End: 2020-07-31

## 2020-07-31 DIAGNOSIS — Z12.31 ENCOUNTER FOR SCREENING MAMMOGRAM FOR MALIGNANT NEOPLASM OF BREAST: Primary | ICD-10-CM

## 2020-08-15 ENCOUNTER — TELEMEDICINE (OUTPATIENT)
Dept: FAMILY MEDICINE CLINIC | Facility: CLINIC | Age: 60
End: 2020-08-15
Payer: COMMERCIAL

## 2020-08-15 DIAGNOSIS — Z20.822 EXPOSURE TO COVID-19 VIRUS: Primary | ICD-10-CM

## 2020-08-15 DIAGNOSIS — Z20.822 EXPOSURE TO COVID-19 VIRUS: ICD-10-CM

## 2020-08-15 PROCEDURE — 99214 OFFICE O/P EST MOD 30 MIN: CPT | Performed by: FAMILY MEDICINE

## 2020-08-15 PROCEDURE — U0003 INFECTIOUS AGENT DETECTION BY NUCLEIC ACID (DNA OR RNA); SEVERE ACUTE RESPIRATORY SYNDROME CORONAVIRUS 2 (SARS-COV-2) (CORONAVIRUS DISEASE [COVID-19]), AMPLIFIED PROBE TECHNIQUE, MAKING USE OF HIGH THROUGHPUT TECHNOLOGIES AS DESCRIBED BY CMS-2020-01-R: HCPCS | Performed by: FAMILY MEDICINE

## 2020-08-15 NOTE — PROGRESS NOTES
COVID-19 Virtual Visit     Assessment/Plan:  1  Exposure to COVID-19 virus  Reviewed patient's symptoms today  At this time, continue supportive care  Maintain hydration  Take over-the-counter Mucinex  Will send patient for mobile COVID testing  She was advised to quarantine until she received results  If any symptoms worsen, she was advised to call sooner   - Novel Coronavirus (COVID-19), PCR [facility] - Collected at [facility]; Future    Problem List Items Addressed This Visit     None      Visit Diagnoses     Exposure to COVID-19 virus    -  Primary    Relevant Orders    Novel Coronavirus (COVID-19), PCR [facility] - Collected at [facility]        This virtual check-in was done via [employer] and patient was informed that this is a secure, HIPAA-compliant platform  She agrees to proceed     Disposition:      I referred Radha Nguyen to one of our centralized sites for a COVID-19 swab    I spent 6 minutes directly with the patient during this visit    Encounter provider Mireille Valles DO    Provider located at [address] [place] [address] [address] [facility]    Recent Visits  No visits were found meeting these conditions  Showing recent visits within past 7 days and meeting all other requirements     Today's Visits  Date Type Provider Dept   08/15/20 Telemedicine DO [facility]   Showing today's visits and meeting all other requirements     Future Appointments  No visits were found meeting these conditions  Showing future appointments within next 150 days and meeting all other requirements        Patient agrees to participate in a virtual check in via telephone or video visit instead of presenting to the office to address urgent/immediate medical needs  Patient is aware this is a billable service  After connecting through [employer], the patient was identified by name and date of birth   Emmanuel Velez Jean was informed that this was a telemedicine visit and that the exam was being conducted confidentially over secure lines  My office door was closed  No one else was in the room  Chinyere Thurman acknowledged consent and understanding of privacy and security of the telemedicine visit  I informed the patient that I have reviewed her record in Epic and presented the opportunity for her to ask any questions regarding the visit today  The patient agreed to participate  Subjective  Chinyere Thurman is a 56 y o  female who is concerned about COVID-19  She reports fever, cough, sore throat, fatigue, myalgias and abdominal pain  She has not experienced chills, shortness of breath, anosmia, diarrhea, nausea and vomiting She has had contact with a person who is under investigation for or who is positive for COVID-19 within the last 14 days  She has not been hospitalized recently for fever and/or lower respiratory symptoms  Past Medical History:   Diagnosis Date    Chronic cough     Depression     GERD (gastroesophageal reflux disease)     Hiatal hernia     resolved 12/7/16    Hyperlipidemia     Insomnia     Post herpetic neuralgia     last assessed 7/13/16, resolved 12/7/16    Sacroiliac pain        Past Surgical History:   Procedure Laterality Date    COLONOSCOPY      EAR SURGERY      GASTRIC BYPASS      HYSTERECTOMY      OOPHORECTOMY Bilateral     [place] COLONOSCOPY FLX DX W/COLLJ SPEC WHEN PFRMD N/A 2/5/2019    Procedure: COLONOSCOPY;  Surgeon: Gerard Wallace MD;  Location: [facility];   Service: General       Current Outpatient Medications   Medication Sig Dispense Refill    B Complex-C CAPS Take by mouth      Biotin 1 MG CAPS BID      Calcium Carb-Cholecalciferol (CALCIUM 1000 + D PO) 1,000 mg BID      escitalopram (LEXAPRO) 10 mg tablet TAKE 1 TABLET DAILY 30 tablet 5    Fish Oil-Krill Oil (KRILL OIL PLUS) CAPS Take by mouth      levothyroxine 88 mcg tablet TAKE 1 TABLET DAILY 90 tablet 4  Multiple Vitamins-Minerals (CENTRUM SILVER ADULT 50+ PO) Take by mouth      niacin 100 mg tablet niacin      omeprazole (PriLOSEC) 20 mg delayed release capsule Take 1 capsule (20 mg total) by mouth daily before breakfast 30 capsule 1    Turmeric 400 MG CAPS turmeric       No current facility-administered medications for this visit  Allergies   Allergen Reactions    Amoxicillin-Pot Clavulanate Facial Swelling and Swelling     Lip swelling       Review of Systems   Constitutional: Negative for activity change, chills, fatigue and fever  HENT: Negative for congestion, ear pain, sinus pressure and sore throat  Eyes: Negative for redness, itching and visual disturbance  Respiratory: Negative for cough and shortness of breath  Cardiovascular: Negative for chest pain and palpitations  Gastrointestinal: Negative for abdominal pain, diarrhea and nausea  Endocrine: Negative for cold intolerance and heat intolerance  Genitourinary: Negative for dysuria, flank pain and frequency  Musculoskeletal: Negative for arthralgias, back pain, gait problem and myalgias  Skin: Negative for color change  Allergic/Immunologic: Negative for environmental allergies  Neurological: Negative for dizziness, numbness and headaches  Psychiatric/Behavioral: Negative for behavioral problems and sleep disturbance  Video Exam    There were no vitals filed for this visit  Susan Alvarez appears healthy, alert, no distress  Physical Exam  Constitutional:       General: She is not in acute distress  Appearance: She is well-developed  She is not ill-appearing or toxic-appearing  HENT:      Head: Normocephalic and atraumatic  Not macrocephalic and not microcephalic  Eyes:      General: Lids are normal       Conjunctiva/sclera: Conjunctivae normal       Pupils: Pupils are equal, round, and reactive to light  Neck:      Musculoskeletal: Normal range of motion     Pulmonary:      Effort: Pulmonary effort is normal  No respiratory distress  Breath sounds: Normal breath sounds  Musculoskeletal: Normal range of motion  Skin:     General: Skin is dry  Findings: No erythema  Neurological:      Cranial Nerves: No cranial nerve deficit  Psychiatric:         Behavior: Behavior normal          Thought Content: Thought content normal          Judgment: Judgment normal           VIRTUAL VISIT DISCLAIMER    Jeremy Balderrama acknowledges that she has consented to an online visit or consultation  She understands that the online visit is based solely on information provided by her, and that, in the absence of a face-to-face physical evaluation by the physician, the diagnosis she receives is both limited and provisional in terms of accuracy and completeness  This is not intended to replace a full medical face-to-face evaluation by the physician  Jeremy Balderrama understands and accepts these terms

## 2020-08-17 LAB — SARS-COV-2 RNA SPEC QL NAA+PROBE: NOT DETECTED

## 2020-09-03 DIAGNOSIS — F32.A DEPRESSIVE DISORDER: ICD-10-CM

## 2020-09-03 RX ORDER — ESCITALOPRAM OXALATE 10 MG/1
TABLET ORAL
Qty: 30 TABLET | Refills: 5 | Status: SHIPPED | OUTPATIENT
Start: 2020-09-03 | End: 2021-03-10

## 2020-10-09 ENCOUNTER — HOSPITAL ENCOUNTER (OUTPATIENT)
Dept: MAMMOGRAPHY | Facility: MEDICAL CENTER | Age: 60
Discharge: HOME | End: 2020-10-09
Payer: COMMERCIAL

## 2020-10-09 VITALS — HEIGHT: 65 IN | WEIGHT: 174 LBS | BODY MASS INDEX: 28.99 KG/M2

## 2020-10-09 DIAGNOSIS — Z12.31 ENCOUNTER FOR SCREENING MAMMOGRAM FOR MALIGNANT NEOPLASM OF BREAST: ICD-10-CM

## 2020-10-09 PROCEDURE — 77063 BREAST TOMOSYNTHESIS BI: CPT

## 2020-10-09 PROCEDURE — 77067 SCR MAMMO BI INCL CAD: CPT

## 2020-10-15 ENCOUNTER — IMMUNIZATIONS (OUTPATIENT)
Dept: FAMILY MEDICINE CLINIC | Facility: CLINIC | Age: 60
End: 2020-10-15
Payer: COMMERCIAL

## 2020-10-15 DIAGNOSIS — Z23 ENCOUNTER FOR IMMUNIZATION: ICD-10-CM

## 2020-10-15 PROCEDURE — 90682 RIV4 VACC RECOMBINANT DNA IM: CPT | Performed by: FAMILY MEDICINE

## 2020-10-15 PROCEDURE — 90471 IMMUNIZATION ADMIN: CPT | Performed by: FAMILY MEDICINE

## 2020-11-04 ENCOUNTER — APPOINTMENT (OUTPATIENT)
Dept: RADIOLOGY | Facility: CLINIC | Age: 60
End: 2020-11-04
Payer: COMMERCIAL

## 2020-11-04 ENCOUNTER — TRANSCRIBE ORDERS (OUTPATIENT)
Dept: LAB | Facility: CLINIC | Age: 60
End: 2020-11-04

## 2020-11-04 DIAGNOSIS — M25.532 LEFT WRIST PAIN: ICD-10-CM

## 2020-11-04 PROCEDURE — 73110 X-RAY EXAM OF WRIST: CPT

## 2020-11-05 ENCOUNTER — TELEPHONE (OUTPATIENT)
Dept: FAMILY MEDICINE CLINIC | Facility: CLINIC | Age: 60
End: 2020-11-05

## 2020-11-05 DIAGNOSIS — E78.5 HYPERLIPIDEMIA, UNSPECIFIED HYPERLIPIDEMIA TYPE: Primary | ICD-10-CM

## 2020-11-07 ENCOUNTER — LAB (OUTPATIENT)
Dept: LAB | Facility: MEDICAL CENTER | Age: 60
End: 2020-11-07
Payer: COMMERCIAL

## 2020-11-07 DIAGNOSIS — E78.5 HYPERLIPIDEMIA, UNSPECIFIED HYPERLIPIDEMIA TYPE: ICD-10-CM

## 2020-11-07 LAB
CHOLEST SERPL-MCNC: 266 MG/DL (ref 50–200)
HDLC SERPL-MCNC: 70 MG/DL
LDLC SERPL CALC-MCNC: 156 MG/DL (ref 0–100)
TRIGL SERPL-MCNC: 199 MG/DL

## 2020-11-07 PROCEDURE — 80061 LIPID PANEL: CPT

## 2020-11-07 PROCEDURE — 36415 COLL VENOUS BLD VENIPUNCTURE: CPT

## 2020-11-09 ENCOUNTER — OFFICE VISIT (OUTPATIENT)
Dept: FAMILY MEDICINE CLINIC | Facility: CLINIC | Age: 60
End: 2020-11-09
Payer: COMMERCIAL

## 2020-11-09 VITALS
DIASTOLIC BLOOD PRESSURE: 72 MMHG | HEIGHT: 65 IN | HEART RATE: 104 BPM | SYSTOLIC BLOOD PRESSURE: 112 MMHG | RESPIRATION RATE: 18 BRPM | WEIGHT: 176.2 LBS | TEMPERATURE: 99 F | BODY MASS INDEX: 29.36 KG/M2 | OXYGEN SATURATION: 96 %

## 2020-11-09 DIAGNOSIS — M25.532 LEFT WRIST PAIN: Primary | ICD-10-CM

## 2020-11-09 DIAGNOSIS — Z13.1 SCREENING FOR DIABETES MELLITUS: ICD-10-CM

## 2020-11-09 DIAGNOSIS — F33.0 MILD RECURRENT MAJOR DEPRESSION (HCC): ICD-10-CM

## 2020-11-09 DIAGNOSIS — E78.5 HYPERLIPIDEMIA, UNSPECIFIED HYPERLIPIDEMIA TYPE: ICD-10-CM

## 2020-11-09 DIAGNOSIS — Z13.0 SCREENING FOR IRON DEFICIENCY ANEMIA: ICD-10-CM

## 2020-11-09 DIAGNOSIS — K21.9 GASTROESOPHAGEAL REFLUX DISEASE WITHOUT ESOPHAGITIS: ICD-10-CM

## 2020-11-09 DIAGNOSIS — E03.9 HYPOTHYROIDISM, UNSPECIFIED TYPE: ICD-10-CM

## 2020-11-09 DIAGNOSIS — F32.A DEPRESSIVE DISORDER: ICD-10-CM

## 2020-11-09 PROCEDURE — 3008F BODY MASS INDEX DOCD: CPT | Performed by: FAMILY MEDICINE

## 2020-11-09 PROCEDURE — 99214 OFFICE O/P EST MOD 30 MIN: CPT | Performed by: FAMILY MEDICINE

## 2020-11-09 PROCEDURE — 1036F TOBACCO NON-USER: CPT | Performed by: FAMILY MEDICINE

## 2020-11-09 PROCEDURE — 3725F SCREEN DEPRESSION PERFORMED: CPT | Performed by: FAMILY MEDICINE

## 2020-11-09 RX ORDER — MELOXICAM 15 MG/1
15 TABLET ORAL DAILY
Qty: 30 TABLET | Refills: 1 | Status: SHIPPED | OUTPATIENT
Start: 2020-11-09 | End: 2020-12-03 | Stop reason: SDUPTHER

## 2020-11-18 DIAGNOSIS — E03.9 HYPOTHYROIDISM, UNSPECIFIED TYPE: ICD-10-CM

## 2020-11-18 RX ORDER — LEVOTHYROXINE SODIUM 88 UG/1
TABLET ORAL
Qty: 90 TABLET | Refills: 1 | Status: SHIPPED | OUTPATIENT
Start: 2020-11-18 | End: 2021-05-17

## 2020-12-03 DIAGNOSIS — M25.532 LEFT WRIST PAIN: ICD-10-CM

## 2020-12-04 RX ORDER — MELOXICAM 15 MG/1
15 TABLET ORAL DAILY
Qty: 90 TABLET | Refills: 0 | Status: SHIPPED | OUTPATIENT
Start: 2020-12-04 | End: 2021-05-07

## 2020-12-08 ENCOUNTER — OFFICE VISIT (OUTPATIENT)
Dept: OBGYN CLINIC | Facility: MEDICAL CENTER | Age: 60
End: 2020-12-08
Payer: COMMERCIAL

## 2020-12-08 VITALS
BODY MASS INDEX: 29.32 KG/M2 | DIASTOLIC BLOOD PRESSURE: 81 MMHG | HEIGHT: 65 IN | SYSTOLIC BLOOD PRESSURE: 156 MMHG | HEART RATE: 100 BPM | WEIGHT: 176 LBS | TEMPERATURE: 96.9 F

## 2020-12-08 DIAGNOSIS — M25.532 LEFT WRIST PAIN: ICD-10-CM

## 2020-12-08 DIAGNOSIS — M77.8 LEFT WRIST TENDONITIS: Primary | ICD-10-CM

## 2020-12-08 PROCEDURE — 99244 OFF/OP CNSLTJ NEW/EST MOD 40: CPT | Performed by: ORTHOPAEDIC SURGERY

## 2020-12-08 PROCEDURE — 1036F TOBACCO NON-USER: CPT | Performed by: ORTHOPAEDIC SURGERY

## 2020-12-08 PROCEDURE — 3008F BODY MASS INDEX DOCD: CPT | Performed by: ORTHOPAEDIC SURGERY

## 2020-12-08 PROCEDURE — 20550 NJX 1 TENDON SHEATH/LIGAMENT: CPT | Performed by: ORTHOPAEDIC SURGERY

## 2020-12-08 RX ORDER — LIDOCAINE HYDROCHLORIDE 10 MG/ML
1 INJECTION, SOLUTION INFILTRATION; PERINEURAL
Status: COMPLETED | OUTPATIENT
Start: 2020-12-08 | End: 2020-12-08

## 2020-12-08 RX ORDER — BETAMETHASONE SODIUM PHOSPHATE AND BETAMETHASONE ACETATE 3; 3 MG/ML; MG/ML
6 INJECTION, SUSPENSION INTRA-ARTICULAR; INTRALESIONAL; INTRAMUSCULAR; SOFT TISSUE
Status: COMPLETED | OUTPATIENT
Start: 2020-12-08 | End: 2020-12-08

## 2020-12-08 RX ADMIN — LIDOCAINE HYDROCHLORIDE 1 ML: 10 INJECTION, SOLUTION INFILTRATION; PERINEURAL at 09:42

## 2020-12-08 RX ADMIN — BETAMETHASONE SODIUM PHOSPHATE AND BETAMETHASONE ACETATE 6 MG: 3; 3 INJECTION, SUSPENSION INTRA-ARTICULAR; INTRALESIONAL; INTRAMUSCULAR; SOFT TISSUE at 09:42

## 2020-12-31 ENCOUNTER — TELEMEDICINE (OUTPATIENT)
Dept: FAMILY MEDICINE CLINIC | Facility: CLINIC | Age: 60
End: 2020-12-31
Payer: COMMERCIAL

## 2020-12-31 DIAGNOSIS — J06.9 VIRAL URI WITH COUGH: ICD-10-CM

## 2020-12-31 DIAGNOSIS — J06.9 VIRAL URI WITH COUGH: Primary | ICD-10-CM

## 2020-12-31 DIAGNOSIS — Z20.822 EXPOSURE TO COVID-19 VIRUS: ICD-10-CM

## 2020-12-31 PROCEDURE — 99214 OFFICE O/P EST MOD 30 MIN: CPT | Performed by: FAMILY MEDICINE

## 2020-12-31 PROCEDURE — U0003 INFECTIOUS AGENT DETECTION BY NUCLEIC ACID (DNA OR RNA); SEVERE ACUTE RESPIRATORY SYNDROME CORONAVIRUS 2 (SARS-COV-2) (CORONAVIRUS DISEASE [COVID-19]), AMPLIFIED PROBE TECHNIQUE, MAKING USE OF HIGH THROUGHPUT TECHNOLOGIES AS DESCRIBED BY CMS-2020-01-R: HCPCS | Performed by: FAMILY MEDICINE

## 2020-12-31 RX ORDER — ALBUTEROL SULFATE 90 UG/1
2 AEROSOL, METERED RESPIRATORY (INHALATION) EVERY 6 HOURS PRN
Qty: 1 INHALER | Refills: 1 | Status: SHIPPED | OUTPATIENT
Start: 2020-12-31 | End: 2021-05-07

## 2021-01-01 LAB — SARS-COV-2 RNA SPEC QL NAA+PROBE: NOT DETECTED

## 2021-01-03 ENCOUNTER — TELEPHONE (OUTPATIENT)
Dept: OTHER | Facility: OTHER | Age: 61
End: 2021-01-03

## 2021-01-03 NOTE — TELEPHONE ENCOUNTER
Pt  Would like to cancel appointment on 1/5 at 8:45am  Pt is currently in quarantine  She will call back to reschedule

## 2021-01-04 ENCOUNTER — TELEPHONE (OUTPATIENT)
Dept: FAMILY MEDICINE CLINIC | Facility: CLINIC | Age: 61
End: 2021-01-04

## 2021-01-04 ENCOUNTER — APPOINTMENT (OUTPATIENT)
Dept: RADIOLOGY | Facility: MEDICAL CENTER | Age: 61
End: 2021-01-04
Payer: COMMERCIAL

## 2021-01-04 ENCOUNTER — OFFICE VISIT (OUTPATIENT)
Dept: URGENT CARE | Facility: MEDICAL CENTER | Age: 61
End: 2021-01-04
Payer: COMMERCIAL

## 2021-01-04 VITALS
BODY MASS INDEX: 29.66 KG/M2 | RESPIRATION RATE: 16 BRPM | WEIGHT: 178 LBS | TEMPERATURE: 98.2 F | HEART RATE: 95 BPM | HEIGHT: 65 IN | OXYGEN SATURATION: 98 %

## 2021-01-04 DIAGNOSIS — R05.9 COUGH: ICD-10-CM

## 2021-01-04 DIAGNOSIS — R07.89 CHEST TIGHTNESS: Primary | ICD-10-CM

## 2021-01-04 DIAGNOSIS — J40 BRONCHITIS: ICD-10-CM

## 2021-01-04 DIAGNOSIS — Z20.822 ENCOUNTER FOR LABORATORY TESTING FOR COVID-19 VIRUS: ICD-10-CM

## 2021-01-04 LAB
ATRIAL RATE: 86 BPM
P AXIS: 66 DEGREES
PR INTERVAL: 142 MS
QRS AXIS: -14 DEGREES
QRSD INTERVAL: 72 MS
QT INTERVAL: 394 MS
QTC INTERVAL: 471 MS
T WAVE AXIS: 57 DEGREES
VENTRICULAR RATE: 86 BPM

## 2021-01-04 PROCEDURE — 71046 X-RAY EXAM CHEST 2 VIEWS: CPT

## 2021-01-04 PROCEDURE — S9083 URGENT CARE CENTER GLOBAL: HCPCS | Performed by: PHYSICIAN ASSISTANT

## 2021-01-04 PROCEDURE — U0003 INFECTIOUS AGENT DETECTION BY NUCLEIC ACID (DNA OR RNA); SEVERE ACUTE RESPIRATORY SYNDROME CORONAVIRUS 2 (SARS-COV-2) (CORONAVIRUS DISEASE [COVID-19]), AMPLIFIED PROBE TECHNIQUE, MAKING USE OF HIGH THROUGHPUT TECHNOLOGIES AS DESCRIBED BY CMS-2020-01-R: HCPCS | Performed by: PHYSICIAN ASSISTANT

## 2021-01-04 PROCEDURE — 93010 ELECTROCARDIOGRAM REPORT: CPT | Performed by: INTERNAL MEDICINE

## 2021-01-04 PROCEDURE — G0382 LEV 3 HOSP TYPE B ED VISIT: HCPCS | Performed by: PHYSICIAN ASSISTANT

## 2021-01-04 PROCEDURE — 93005 ELECTROCARDIOGRAM TRACING: CPT | Performed by: PHYSICIAN ASSISTANT

## 2021-01-04 RX ORDER — PREDNISONE 10 MG/1
TABLET ORAL
Qty: 30 TABLET | Refills: 0 | Status: SHIPPED | OUTPATIENT
Start: 2021-01-04 | End: 2021-05-07

## 2021-01-04 RX ORDER — AZITHROMYCIN 250 MG/1
TABLET, FILM COATED ORAL
Qty: 6 TABLET | Refills: 0 | Status: SHIPPED | OUTPATIENT
Start: 2021-01-04 | End: 2021-01-08

## 2021-01-04 RX ORDER — BENZONATATE 100 MG/1
100 CAPSULE ORAL 3 TIMES DAILY PRN
Qty: 20 CAPSULE | Refills: 0 | Status: SHIPPED | OUTPATIENT
Start: 2021-01-04 | End: 2021-05-07

## 2021-01-04 NOTE — TELEPHONE ENCOUNTER
Pt LM stating she was tested negative for covid on 12/31 and was advised to be retested in 72 hours  Pt is calling for order so she can go to [facility] this morning  I called Pt and she states her job at [employer] told her she has to be tested 72 hours after contact  Pt states she still has respiratory sxs and has not been sleeping well  Inhaler does not seem to be helping  Please advise

## 2021-01-04 NOTE — PROGRESS NOTES
[facility]        NAME: Ana Paula Artterri is a 56 y o  female  : 1960    MRN: [id number]  DATE: 2021  TIME: 5:37 PM    Pulse 95   Temp 98 2 °F (36 8 °C)   Resp 16   Ht 5' 5" (1 651 m)   Wt 80 7 kg (178 lb)   LMP  (LMP Unknown)   SpO2 98%   BMI 29 62 kg/m²     Assessment and Plan   Chest tightness [R07 89]  1  Chest tightness  ECG 12 lead    XR chest pa & lateral    predniSONE 10 mg tablet    benzonatate (TESSALON PERLES) 100 mg capsule    azithromycin (ZITHROMAX) 250 mg tablet   2  Encounter for laboratory testing for COVID-19 virus  Novel Coronavirus (COVID-19), PCR [facility] - Office Collection    predniSONE 10 mg tablet    benzonatate (TESSALON PERLES) 100 mg capsule    azithromycin (ZITHROMAX) 250 mg tablet   3  Bronchitis  predniSONE 10 mg tablet    benzonatate (TESSALON PERLES) 100 mg capsule    azithromycin (ZITHROMAX) 250 mg tablet         Patient Instructions       Follow up with PCP in 3-5 days  Proceed to  ER if symptoms worsen  Chief Complaint     Chief Complaint   Patient presents with    Cough     Pt complains of chest congestion, shortness of breath with exertion, fever, and cough for 6 days  Pt has taken tylenol and albuterol with little relief  History of Present Illness       Pt with 1 week cough congestion chest heaviness  Pt with recent negative covid, pt works in health care     Cough  Associated symptoms include shortness of breath  Review of Systems   Review of Systems   Constitutional: Negative  HENT: Negative  Eyes: Negative  Respiratory: Positive for cough, chest tightness and shortness of breath  Cardiovascular: Negative  Gastrointestinal: Negative  Endocrine: Negative  Genitourinary: Negative  Musculoskeletal: Negative  Skin: Negative  Allergic/Immunologic: Negative  Neurological: Negative  Psychiatric/Behavioral: Negative  All other systems reviewed and are negative          Current Medications Current Outpatient Medications:     albuterol (PROVENTIL HFA,VENTOLIN HFA) 90 mcg/act inhaler, Inhale 2 puffs every 6 (six) hours as needed for wheezing or shortness of breath, Disp: 1 Inhaler, Rfl: 1    B Complex-C CAPS, Take by mouth, Disp: , Rfl:     Biotin 1 MG CAPS, BID, Disp: , Rfl:     Calcium Carb-Cholecalciferol (CALCIUM 1000 + D PO), 1,000 mg BID, Disp: , Rfl:     escitalopram (LEXAPRO) 10 mg tablet, TAKE 1 TABLET DAILY, Disp: 30 tablet, Rfl: 5    Fish Oil-Krill Oil (KRILL OIL PLUS) CAPS, Take by mouth, Disp: , Rfl:     levothyroxine 88 mcg tablet, TAKE 1 TABLET DAILY, Disp: 90 tablet, Rfl: 1    Multiple Vitamins-Minerals (CENTRUM SILVER ADULT 50+ PO), Take by mouth, Disp: , Rfl:     niacin 100 mg tablet, niacin, Disp: , Rfl:     omeprazole (PriLOSEC) 20 mg delayed release capsule, Take 1 capsule (20 mg total) by mouth daily before breakfast, Disp: 30 capsule, Rfl: 1    Turmeric 400 MG CAPS, turmeric, Disp: , Rfl:     azithromycin (ZITHROMAX) 250 mg tablet, Take 2 tablets today then 1 tablet daily x 4 days, Disp: 6 tablet, Rfl: 0    benzonatate (TESSALON PERLES) 100 mg capsule, Take 1 capsule (100 mg total) by mouth 3 (three) times a day as needed for cough, Disp: 20 capsule, Rfl: 0    meloxicam (MOBIC) 15 mg tablet, Take 1 tablet (15 mg total) by mouth daily (Patient not taking: Reported on 1/4/2021), Disp: 90 tablet, Rfl: 0    predniSONE 10 mg tablet, 5 tabs po qd x 2 days then 4 tabs po qd x 2 days then 3 tabs po qd x 2 days then 2 tabs po qd x 2 days then 1 tab po qd x 2 days, Disp: 30 tablet, Rfl: 0    Current Allergies     Allergies as of 01/04/2021 - Reviewed 01/04/2021   Allergen Reaction Noted    Amoxicillin-pot clavulanate Facial Swelling and Swelling 12/12/2017            The following portions of the patient's history were reviewed and updated as appropriate: allergies, current medications, past family history, past medical history, past social history, past surgical history and problem list      Past Medical History:   Diagnosis Date    Chronic cough     Depression     GERD (gastroesophageal reflux disease)     Hiatal hernia     resolved 12/7/16    Hyperlipidemia     Insomnia     Post herpetic neuralgia     last assessed 7/13/16, resolved 12/7/16    Sacroiliac pain        Past Surgical History:   Procedure Laterality Date    COLONOSCOPY      EAR SURGERY      GASTRIC BYPASS      HYSTERECTOMY      OOPHORECTOMY Bilateral     OR COLONOSCOPY FLX DX W/COLLJ SPEC WHEN PFRMD N/A 2/5/2019    Procedure: COLONOSCOPY;  Surgeon: Bertin Champagne MD;  Location: [facility]; Service: General       Family History   Problem Relation Age of Onset    Diabetes Mother     Lymphoma Mother 76        non-hodgkins    Breast cancer Sister 48    Stroke Father     No Known Problems Daughter     Breast cancer Maternal Grandmother         age at onset unknown    No Known Problems Maternal Grandfather     Breast cancer Paternal Grandmother         age at onset unknown    No Known Problems Paternal Grandfather     No Known Problems Daughter     Breast cancer Maternal Aunt     No Known Problems Maternal Aunt     No Known Problems Maternal Aunt     No Known Problems Paternal Aunt     No Known Problems Paternal Aunt     No Known Problems Paternal Aunt          Medications have been verified  Objective   Pulse 95   Temp 98 2 °F (36 8 °C)   Resp 16   Ht 5' 5" (1 651 m)   Wt 80 7 kg (178 lb)   LMP  (LMP Unknown)   SpO2 98%   BMI 29 62 kg/m²        Physical Exam     Physical Exam  Vitals signs and nursing note reviewed  Constitutional:       Appearance: Normal appearance  She is normal weight  Comments: Pt describing chest pressure  Same as several years ago and give prednisone for breathing problems    Pt asking for repeat covid  Pt declines er evaluation for chest pressure    HENT:      Head: Normocephalic and atraumatic        Right Ear: Tympanic membrane, ear canal and external ear normal       Left Ear: Tympanic membrane, ear canal and external ear normal       Nose: Nose normal       Mouth/Throat:      Mouth: Mucous membranes are moist       Pharynx: Oropharynx is clear  Eyes:      Extraocular Movements: Extraocular movements intact  Conjunctiva/sclera: Conjunctivae normal       Pupils: Pupils are equal, round, and reactive to light  Neck:      Musculoskeletal: Normal range of motion and neck supple  Cardiovascular:      Rate and Rhythm: Normal rate and regular rhythm  Pulses: Normal pulses  Heart sounds: Normal heart sounds  Pulmonary:      Effort: Pulmonary effort is normal       Breath sounds: Normal breath sounds  Abdominal:      General: Abdomen is flat  Bowel sounds are normal       Palpations: Abdomen is soft  Musculoskeletal: Normal range of motion  Skin:     Capillary Refill: Capillary refill takes less than 2 seconds  Neurological:      General: No focal deficit present  Mental Status: She is alert and oriented to person, place, and time     Psychiatric:         Mood and Affect: Mood normal          Behavior: Behavior normal

## 2021-01-04 NOTE — PATIENT INSTRUCTIONS
[address]    Your healthcare provider and/or public health staff have evaluated you and have determined that you do not need to remain in the hospital at this time  At this time you can be isolated at home where you will be monitored by staff from your local or state health department  You should carefully follow the prevention and isolation steps below until a healthcare provider or local or state health department says that you can return to your normal activities  Stay home except to get medical care    People who are mildly ill with COVID-19 are able to isolate at home during their illness  You should restrict activities outside your home, except for getting medical care  Do not go to work, school, or public areas  Avoid using public transportation, ride-sharing, or taxis  Separate yourself from other people and animals in your home    People: As much as possible, you should stay in a specific room and away from other people in your home  Also, you should use a separate bathroom, if available  Animals: You should restrict contact with pets and other animals while you are sick with COVID-19, just like you would around other people  Although there have not been reports of pets or other animals becoming sick with COVID-19, it is still recommended that people sick with COVID-19 limit contact with animals until more information is known about the virus  When possible, have another member of your household care for your animals while you are sick  If you are sick with COVID-19, avoid contact with your pet, including petting, snuggling, being kissed or licked, and sharing food  If you must care for your pet or be around animals while you are sick, wash your hands before and after you interact with pets and wear a facemask  See COVID-19 and Animals for more information      Call ahead before visiting your doctor    If you have a medical appointment, call the healthcare provider and tell them that you have or may have COVID-19  This will help the healthcare providers office take steps to keep other people from getting infected or exposed  Wear a facemask    You should wear a facemask when you are around other people (e g , sharing a room or vehicle) or pets and before you enter a healthcare providers office  If you are not able to wear a facemask (for example, because it causes trouble breathing), then people who live with you should not stay in the same room with you, or they should wear a facemask if they enter your room  Cover your coughs and sneezes    Cover your mouth and nose with a tissue when you cough or sneeze  Throw used tissues in a lined trash can  Immediately wash your hands with soap and water for at least 20 seconds or, if soap and water are not available, clean your hands with an alcohol-based hand  that contains at least 60% alcohol  Clean your hands often    Wash your hands often with soap and water for at least 20 seconds, especially after blowing your nose, coughing, or sneezing; going to the bathroom; and before eating or preparing food  If soap and water are not readily available, use an alcohol-based hand  with at least 60% alcohol, covering all surfaces of your hands and rubbing them together until they feel dry  Soap and water are the best option if hands are visibly dirty  Avoid touching your eyes, nose, and mouth with unwashed hands  Avoid sharing personal household items    You should not share dishes, drinking glasses, cups, eating utensils, towels, or bedding with other people or pets in your home  After using these items, they should be washed thoroughly with soap and water  Clean all high-touch surfaces everyday    High touch surfaces include counters, tabletops, doorknobs, bathroom fixtures, toilets, phones, keyboards, tablets, and bedside tables  Also, clean any surfaces that may have blood, stool, or body fluids on them   Use a household cleaning spray or wipe, according to the label instructions  Labels contain instructions for safe and effective use of the cleaning product including precautions you should take when applying the product, such as wearing gloves and making sure you have good ventilation during use of the product  Monitor your symptoms    Seek prompt medical attention if your illness is worsening (e g , difficulty breathing)  Before seeking care, call your healthcare provider and tell them that you have, or are being evaluated for, COVID-19  Put on a facemask before you enter the facility  These steps will help the healthcare providers office to keep other people in the office or waiting room from getting infected or exposed  Ask your healthcare provider to call the local or [facility] health department  Persons who are placed under active monitoring or facilitated self-monitoring should follow instructions provided by their local health department or occupational health professionals, as appropriate  If you have a medical emergency and need to call 911, notify the dispatch personnel that you have, or are being evaluated for COVID-19  If possible, put on a facemask before emergency medical services arrive      Discontinuing home isolation    Patients with confirmed COVID-19 should remain under home isolation precautions until the following conditions are met:   - They have had no fever for at least 24 hours (that is one full day of no fever without the use medicine that reduces fevers)  AND  - other symptoms have improved (for example, when their cough or shortness of breath have improved)  AND  - If had mild or moderate illness, at least 10 days have passed since their symptoms first appeared or if severe illness (needed oxygen) or immunosuppressed, at least 20 days have passed since symptoms first appeared  Patients with confirmed COVID-19 should also notify close contacts (including their workplace) and ask that they self-quarantine  Currently, close contact is defined as being within 6 feet for 15 minutes or more from the period 24 hours starting 48 hours before symptom onset to the time at which the patient went into isolation  Close contacts of patients diagnosed with COVID-19 should be instructed by the patient to self-quarantine for 14 days from the last time of their last contact with the patient  Source: [employer]   WHAT YOU NEED TO KNOW:   Chest pain can be caused by a range of conditions, from not serious to life-threatening  Chest pain can be a symptom of a digestive problem, such as acid reflux or a stomach ulcer  An anxiety attack or a strong emotion, such as anger, can also cause chest pain  Infection, inflammation, or a fracture in the bones or cartilage in your chest can cause pain or discomfort  Sometimes chest pain or pressure is caused by poor blood flow to your heart (angina)  Chest pain may also be caused by life-threatening conditions such as a heart attack or blood clot in your lungs  DISCHARGE INSTRUCTIONS:   Call 911 if:   · You have any of the following signs of a heart attack:      ? Squeezing, pressure, or pain in your chest    ? You may  also have any of the following:     § Discomfort or pain in your back, neck, jaw, stomach, or arm    § Shortness of breath    § Nausea or vomiting    § Lightheadedness or a sudden cold sweat      Return to the emergency department if:   · You have chest discomfort that gets worse, even with medicine  · You cough or vomit blood  · Your bowel movements are black or bloody  · You cannot stop vomiting, or it hurts to swallow  Contact your healthcare provider if:   · You have questions or concerns about your condition or care  Medicines:   · Medicines  may be given to treat the cause of your chest pain   Examples include pain medicine, anxiety medicine, or medicines to increase blood flow to your heart  · Do not take certain medicines without asking your healthcare provider first   These include NSAIDs, herbal or vitamin supplements, or hormones (estrogen or progestin)  · Take your medicine as directed  Contact your healthcare provider if you think your medicine is not helping or if you have side effects  Tell him or her if you are allergic to any medicine  Keep a list of the medicines, vitamins, and herbs you take  Include the amounts, and when and why you take them  Bring the list or the pill bottles to follow-up visits  Carry your medicine list with you in case of an emergency  Follow up with your healthcare provider within 72 hours, or as directed: You may need to return for more tests to find the cause of your chest pain  You may be referred to a specialist, such as a cardiologist or gastroenterologist  Write down your questions so you remember to ask them during your visits  Healthy living tips: The following are general healthy guidelines  If your chest pain is caused by a heart problem, your healthcare provider will give you specific guidelines to follow  · Do not smoke  Nicotine and other chemicals in cigarettes and cigars can cause lung and heart damage  Ask your healthcare provider for information if you currently smoke and need help to quit  E-cigarettes or smokeless tobacco still contain nicotine  Talk to your healthcare provider before you use these products  · Eat a variety of healthy, low-fat, low-salt foods  Healthy foods include fruits, vegetables, whole-grain breads, low-fat dairy products, beans, lean meats, and fish  Ask for more information about a heart healthy diet  · Drink plenty of water every day  Your body is made of mostly water  Water helps your body to control your temperature and blood pressure  Ask your healthcare provider how much water you should drink every day  · Ask about activity    Your healthcare provider will tell you which activities to limit or avoid  Ask when you can drive, return to work, and have sex  Ask about the best exercise plan for you  · Maintain a healthy weight  Ask your healthcare provider how much you should weigh  Ask him or her to help you create a weight loss plan if you are overweight  · Get the flu and pneumonia vaccines  All adults should get the influenza (flu) vaccine  Get it every year as soon as it becomes available  The pneumococcal vaccine is given to adults aged 72 years or older  The vaccine is given every 5 years to prevent pneumococcal disease, such as pneumonia  If you have a stent:   · Carry your stent card with you at all times  · Let all healthcare providers know that you have a stent  © Copyright [address] Information is for End User's use only and may not be sold, redistributed or otherwise used for commercial purposes  All illustrations and images included in CareNotes® are the copyrighted property of A D A M , Inc  or [employer]   The above information is an  only  It is not intended as medical advice for individual conditions or treatments  Talk to your doctor, nurse or pharmacist before following any medical regimen to see if it is safe and effective for you  Acute Bronchitis   WHAT YOU NEED TO KNOW:   Acute bronchitis is swelling and irritation in your lungs  It is usually caused by a virus and most often happens in the winter  Bronchitis may also be caused by bacteria or by a chemical irritant, such as smoke  DISCHARGE INSTRUCTIONS:   Return to the emergency department if:   · You cough up blood  · Your lips or fingernails turn blue  · You feel like you are not getting enough air when you breathe  Call your doctor if:   · Your symptoms do not go away or get worse, even after treatment  · Your cough does not get better within 4 weeks  · You have questions or concerns about your condition or care      Medicines: You may  need any of the following:  · Cough suppressants  decrease your urge to cough  · Decongestants  help loosen mucus in your lungs and make it easier to cough up  This can help you breathe easier  · Inhalers  may be given  Your healthcare provider may give you one or more inhalers to help you breathe easier and cough less  An inhaler gives your medicine to open your airways  Ask your healthcare provider to show you how to use your inhaler correctly  · Acetaminophen  decreases pain and fever  It is available without a doctor's order  Ask how much to take and how often to take it  Follow directions  Read the labels of all other medicines you are using to see if they also contain acetaminophen, or ask your doctor or pharmacist  Acetaminophen can cause liver damage if not taken correctly  Do not use more than 4 grams (4,000 milligrams) total of acetaminophen in one day  · NSAIDs  help decrease swelling and pain or fever  This medicine is available with or without a doctor's order  NSAIDs can cause stomach bleeding or kidney problems in certain people  If you take blood thinner medicine, always ask your healthcare provider if NSAIDs are safe for you  Always read the medicine label and follow directions  · Take your medicine as directed  Contact your healthcare provider if you think your medicine is not helping or if you have side effects  Tell him of her if you are allergic to any medicine  Keep a list of the medicines, vitamins, and herbs you take  Include the amounts, and when and why you take them  Bring the list or the pill bottles to follow-up visits  Carry your medicine list with you in case of an emergency  Self-care:   · Drink liquids as directed  You may need to drink more liquids than usual to stay hydrated  Ask how much liquid to drink each day and which liquids are best for you  · Use a cool mist humidifier  to increase air moisture in your home   This may make it easier for you to breathe and help decrease your cough  · Get more rest   Rest helps your body to heal  Slowly start to do more each day  Rest when you feel it is needed  · Avoid irritants in the air  Avoid chemicals, fumes, and dust  Wear a face mask if you must work around dust or fumes  Stay inside on days when air pollution levels are high  If you have allergies, stay inside when pollen counts are high  Do not use aerosol products, such as spray-on deodorant, bug spray, and hair spray  · Do not smoke or be around others who are smoking  Nicotine and other chemicals in cigarettes and cigars can cause lung damage  Ask your healthcare provider for information if you currently smoke and need help to quit  E-cigarettes or smokeless tobacco still contain nicotine  Talk to your healthcare provider before you use these products  Prevent acute bronchitis:       · Get the vaccinations you need  Ask your healthcare provider if you should get the flu or pneumonia vaccines  · Prevent the spread of germs  You can decrease your risk for acute bronchitis and other illnesses by doing the following:     ? Wash your hands often with soap and water  Carry germ-killing hand lotion or gel with you  You can use the lotion or gel to clean your hands when soap and water are not available  ? Do not touch your eyes, nose, or mouth unless you have washed your hands first     ? Always cover your mouth when you cough to prevent the spread of germs  It is best to cough into a tissue or your shirt sleeve instead of into your hand  Ask those around you to cover their mouths when they cough  ? Try to avoid people who have a cold or the flu  If you are sick, stay away from others as much as possible  Follow up with your doctor as directed:  Write down questions you have so you will remember to ask them during your follow-up visits    © Copyright [address] Information is for End User's use only and may not be sold, redistributed or otherwise used for commercial purposes  All illustrations and images included in CareNotes® are the copyrighted property of A D A M , Inc  or [employer]  The above information is an  only  It is not intended as medical advice for individual conditions or treatments  Talk to your doctor, nurse or pharmacist before following any medical regimen to see if it is safe and effective for you

## 2021-01-05 DIAGNOSIS — U07.1 COVID-19: Primary | ICD-10-CM

## 2021-01-05 LAB — SARS-COV-2 RNA RESP QL NAA+PROBE: NEGATIVE

## 2021-01-25 DIAGNOSIS — Z23 ENCOUNTER FOR IMMUNIZATION: ICD-10-CM

## 2021-01-31 ENCOUNTER — IMMUNIZATIONS (OUTPATIENT)
Dept: FAMILY MEDICINE CLINIC | Facility: HOSPITAL | Age: 61
End: 2021-01-31

## 2021-01-31 DIAGNOSIS — Z23 ENCOUNTER FOR IMMUNIZATION: Primary | ICD-10-CM

## 2021-01-31 PROCEDURE — 0001A SARS-COV-2 / COVID-19 MRNA VACCINE (PFIZER-BIONTECH) 30 MCG: CPT

## 2021-01-31 PROCEDURE — 91300 SARS-COV-2 / COVID-19 MRNA VACCINE (PFIZER-BIONTECH) 30 MCG: CPT

## 2021-02-21 ENCOUNTER — IMMUNIZATIONS (OUTPATIENT)
Dept: FAMILY MEDICINE CLINIC | Facility: HOSPITAL | Age: 61
End: 2021-02-21

## 2021-02-21 DIAGNOSIS — Z23 ENCOUNTER FOR IMMUNIZATION: Primary | ICD-10-CM

## 2021-02-21 PROCEDURE — 0002A SARS-COV-2 / COVID-19 MRNA VACCINE (PFIZER-BIONTECH) 30 MCG: CPT

## 2021-02-21 PROCEDURE — 91300 SARS-COV-2 / COVID-19 MRNA VACCINE (PFIZER-BIONTECH) 30 MCG: CPT

## 2021-03-10 DIAGNOSIS — F32.A DEPRESSIVE DISORDER: ICD-10-CM

## 2021-03-10 RX ORDER — ESCITALOPRAM OXALATE 10 MG/1
TABLET ORAL
Qty: 30 TABLET | Refills: 5 | Status: SHIPPED | OUTPATIENT
Start: 2021-03-10 | End: 2021-09-07

## 2021-05-01 ENCOUNTER — APPOINTMENT (OUTPATIENT)
Dept: LAB | Facility: MEDICAL CENTER | Age: 61
End: 2021-05-01
Payer: COMMERCIAL

## 2021-05-01 DIAGNOSIS — Z13.0 SCREENING FOR IRON DEFICIENCY ANEMIA: ICD-10-CM

## 2021-05-01 DIAGNOSIS — E03.9 HYPOTHYROIDISM, UNSPECIFIED TYPE: ICD-10-CM

## 2021-05-01 DIAGNOSIS — E78.5 HYPERLIPIDEMIA, UNSPECIFIED HYPERLIPIDEMIA TYPE: ICD-10-CM

## 2021-05-01 DIAGNOSIS — Z13.1 SCREENING FOR DIABETES MELLITUS: ICD-10-CM

## 2021-05-01 LAB
ALBUMIN SERPL BCP-MCNC: 3.5 G/DL (ref 3.5–5)
ALP SERPL-CCNC: 94 U/L (ref 46–116)
ALT SERPL W P-5'-P-CCNC: 33 U/L (ref 12–78)
ANION GAP SERPL CALCULATED.3IONS-SCNC: 6 MMOL/L (ref 4–13)
AST SERPL W P-5'-P-CCNC: 27 U/L (ref 5–45)
BILIRUB SERPL-MCNC: 0.39 MG/DL (ref 0.2–1)
BUN SERPL-MCNC: 8 MG/DL (ref 5–25)
CALCIUM SERPL-MCNC: 8.9 MG/DL (ref 8.3–10.1)
CHLORIDE SERPL-SCNC: 109 MMOL/L (ref 100–108)
CHOLEST SERPL-MCNC: 237 MG/DL (ref 50–200)
CO2 SERPL-SCNC: 26 MMOL/L (ref 21–32)
CREAT SERPL-MCNC: 0.77 MG/DL (ref 0.6–1.3)
ERYTHROCYTE [DISTWIDTH] IN BLOOD BY AUTOMATED COUNT: 12.8 % (ref 11.6–15.1)
EST. AVERAGE GLUCOSE BLD GHB EST-MCNC: 100 MG/DL
GFR SERPL CREATININE-BSD FRML MDRD: 83 ML/MIN/1.73SQ M
GLUCOSE P FAST SERPL-MCNC: 102 MG/DL (ref 65–99)
HBA1C MFR BLD: 5.1 %
HCT VFR BLD AUTO: 43.9 % (ref 34.8–46.1)
HDLC SERPL-MCNC: 65 MG/DL
HGB BLD-MCNC: 14.2 G/DL (ref 11.5–15.4)
LDLC SERPL CALC-MCNC: 124 MG/DL (ref 0–100)
MCH RBC QN AUTO: 30.6 PG (ref 26.8–34.3)
MCHC RBC AUTO-ENTMCNC: 32.3 G/DL (ref 31.4–37.4)
MCV RBC AUTO: 95 FL (ref 82–98)
PLATELET # BLD AUTO: 316 THOUSANDS/UL (ref 149–390)
PMV BLD AUTO: 10.1 FL (ref 8.9–12.7)
POTASSIUM SERPL-SCNC: 4.1 MMOL/L (ref 3.5–5.3)
PROT SERPL-MCNC: 7.2 G/DL (ref 6.4–8.2)
RBC # BLD AUTO: 4.64 MILLION/UL (ref 3.81–5.12)
SODIUM SERPL-SCNC: 141 MMOL/L (ref 136–145)
TRIGL SERPL-MCNC: 240 MG/DL
TSH SERPL DL<=0.05 MIU/L-ACNC: 2.44 UIU/ML (ref 0.36–3.74)
WBC # BLD AUTO: 4.98 THOUSAND/UL (ref 4.31–10.16)

## 2021-05-01 PROCEDURE — 84443 ASSAY THYROID STIM HORMONE: CPT

## 2021-05-01 PROCEDURE — 80053 COMPREHEN METABOLIC PANEL: CPT

## 2021-05-01 PROCEDURE — 85027 COMPLETE CBC AUTOMATED: CPT

## 2021-05-01 PROCEDURE — 83036 HEMOGLOBIN GLYCOSYLATED A1C: CPT

## 2021-05-01 PROCEDURE — 80061 LIPID PANEL: CPT

## 2021-05-01 PROCEDURE — 36415 COLL VENOUS BLD VENIPUNCTURE: CPT

## 2021-05-03 ENCOUNTER — RA CDI HCC (OUTPATIENT)
Dept: OTHER | Facility: HOSPITAL | Age: 61
End: 2021-05-03

## 2021-05-03 NOTE — PROGRESS NOTES
[place] [address]  coding opportunities             Chart reviewed, (number of) suggestions sent to provider: 1     Problem listed updated  Provider Accepted, (number of) suggestions accepted: 0        Patients insurance company: [employer] (Medicare Advantage and Commercial)     Visit status: Patient arrived for their scheduled appointment     Provider never responded to [place] [address]  coding request  Provider did not use dx suggestion     [place] [address]  coding opportunities             Chart reviewed, (number of) suggestions sent to provider: 1           Patients insurance company: [email or web address] ([employer] and [employer])             Found on active problem list - F32 9 Depression   Please refer to Office visit note dated 11/9/20 for a Depression further classified    F33 0 Mild recurrent major depression ([place] [address] )

## 2021-05-07 ENCOUNTER — OFFICE VISIT (OUTPATIENT)
Dept: FAMILY MEDICINE CLINIC | Facility: CLINIC | Age: 61
End: 2021-05-07
Payer: COMMERCIAL

## 2021-05-07 VITALS
SYSTOLIC BLOOD PRESSURE: 110 MMHG | TEMPERATURE: 98.2 F | WEIGHT: 178 LBS | RESPIRATION RATE: 16 BRPM | OXYGEN SATURATION: 98 % | HEART RATE: 98 BPM | DIASTOLIC BLOOD PRESSURE: 68 MMHG | HEIGHT: 65 IN | BODY MASS INDEX: 29.66 KG/M2

## 2021-05-07 DIAGNOSIS — E03.9 HYPOTHYROIDISM, UNSPECIFIED TYPE: ICD-10-CM

## 2021-05-07 DIAGNOSIS — F32.A DEPRESSIVE DISORDER: Primary | ICD-10-CM

## 2021-05-07 DIAGNOSIS — K21.9 GASTROESOPHAGEAL REFLUX DISEASE WITHOUT ESOPHAGITIS: ICD-10-CM

## 2021-05-07 PROCEDURE — 3008F BODY MASS INDEX DOCD: CPT | Performed by: FAMILY MEDICINE

## 2021-05-07 PROCEDURE — 99214 OFFICE O/P EST MOD 30 MIN: CPT | Performed by: FAMILY MEDICINE

## 2021-05-07 PROCEDURE — 1036F TOBACCO NON-USER: CPT | Performed by: FAMILY MEDICINE

## 2021-05-07 NOTE — PROGRESS NOTES
Assessment/Plan:   1  Depressive disorder    Appears stable today  Patient denies any recent exacerbations of her depression  She is encouraged on importance of maintaining proper exercise  At this time, will continue with her current treatment of Lexapro 10 mg daily  2  Hypothyroidism, unspecified type    Patient is clinically asymptomatic  At this time, thyroid function testing appeared stable  Continue current treatment with levothyroxine  3  Gastroesophageal reflux disease without esophagitis    Stable  Patient denies any recent exacerbations of her GERD  At this time, she has discontinued her omeprazole  Will continue with famotidine for further symptom relief  Follow up with patient in 6 months  BMI Counseling: Body mass index is 29 66 kg/m²  The BMI is above normal  Nutrition recommendations include decreasing portion sizes, encouraging healthy choices of fruits and vegetables, decreasing fast food intake, consuming healthier snacks and limiting drinks that contain sugar  Exercise recommendations include moderate physical activity 150 minutes/week and exercising 3-5 times per week  No pharmacotherapy was ordered  Patient referred to PCP due to patient being overweight  Diagnoses and all orders for this visit:    Depressive disorder    Hypothyroidism, unspecified type    Gastroesophageal reflux disease without esophagitis          Subjective:       Chief Complaint   Patient presents with    Follow-up     6 months      Patient ID: Yan Smith is a 56 y o  female  Patient is a 24-year-old female presents today for follow-up on her chronic conditions  She has depressive disorder, hypothyroidism as well as GERD  She has been taking medications regularly  She denies adverse reactions medications  Review of Systems   Constitutional: Negative for activity change, chills, fatigue and fever  HENT: Negative for congestion, ear pain, sinus pressure and sore throat      Eyes: Negative for redness, itching and visual disturbance  Respiratory: Negative for cough and shortness of breath  Cardiovascular: Negative for chest pain and palpitations  Gastrointestinal: Negative for abdominal pain, diarrhea and nausea  Endocrine: Negative for cold intolerance and heat intolerance  Genitourinary: Negative for dysuria, flank pain and frequency  Musculoskeletal: Negative for arthralgias, back pain, gait problem and myalgias  Skin: Negative for color change  Allergic/Immunologic: Negative for environmental allergies  Neurological: Negative for dizziness, numbness and headaches  Psychiatric/Behavioral: Negative for behavioral problems and sleep disturbance  The following portions of the patient's history were reviewed and updated as appropriate : past family history, past medical history, past social history and past surgical history  Current Outpatient Medications:     B Complex-C CAPS, Take by mouth, Disp: , Rfl:     Biotin 1 MG CAPS, BID, Disp: , Rfl:     Calcium Carb-Cholecalciferol (CALCIUM 1000 + D PO), 1,000 mg BID, Disp: , Rfl:     escitalopram (LEXAPRO) 10 mg tablet, TAKE 1 TABLET DAILY, Disp: 30 tablet, Rfl: 5    Fish Oil-Krill Oil (KRILL OIL PLUS) CAPS, Take by mouth, Disp: , Rfl:     levothyroxine 88 mcg tablet, TAKE 1 TABLET DAILY, Disp: 90 tablet, Rfl: 1    Multiple Vitamins-Minerals (CENTRUM SILVER ADULT 50+ PO), Take by mouth, Disp: , Rfl:     niacin 100 mg tablet, niacin, Disp: , Rfl:     omeprazole (PriLOSEC) 20 mg delayed release capsule, Take 1 capsule (20 mg total) by mouth daily before breakfast, Disp: 30 capsule, Rfl: 1         Objective:         Vitals:    05/07/21 0852   BP: 110/68   BP Location: Right arm   Patient Position: Sitting   Cuff Size: Adult   Pulse: 98   Resp: 16   Temp: 98 2 °F (36 8 °C)   TempSrc: Tympanic   SpO2: 98%   Weight: 80 7 kg (178 lb)   Height: 5' 4 96" (1 65 m)     Physical Exam  Vitals signs reviewed  Constitutional:       Appearance: She is well-developed  HENT:      Head: Normocephalic and atraumatic  Nose: Nose normal       Mouth/Throat:      Pharynx: No oropharyngeal exudate  Eyes:      General: No scleral icterus  Right eye: No discharge  Left eye: No discharge  Pupils: Pupils are equal, round, and reactive to light  Neck:      Musculoskeletal: Normal range of motion and neck supple  Trachea: No tracheal deviation  Cardiovascular:      Rate and Rhythm: Normal rate and regular rhythm  Pulses:           Dorsalis pedis pulses are 2+ on the right side and 2+ on the left side  Posterior tibial pulses are 2+ on the right side and 2+ on the left side  Heart sounds: Normal heart sounds  No murmur  No friction rub  No gallop  Pulmonary:      Effort: Pulmonary effort is normal  No respiratory distress  Breath sounds: Normal breath sounds  No wheezing or rales  Abdominal:      General: Bowel sounds are normal  There is no distension  Palpations: Abdomen is soft  Tenderness: There is no abdominal tenderness  There is no guarding or rebound  Musculoskeletal: Normal range of motion  Lymphadenopathy:      Head:      Right side of head: No submental or submandibular adenopathy  Left side of head: No submental or submandibular adenopathy  Cervical: No cervical adenopathy  Right cervical: No superficial, deep or posterior cervical adenopathy  Left cervical: No superficial, deep or posterior cervical adenopathy  Skin:     General: Skin is warm and dry  Findings: No erythema  Neurological:      Mental Status: She is alert and oriented to person, place, and time  Cranial Nerves: No cranial nerve deficit  Sensory: No sensory deficit  Psychiatric:         Mood and Affect: Mood is not anxious or depressed  Speech: Speech normal          Behavior: Behavior normal          Thought Content:  Thought content normal          Judgment: Judgment normal

## 2021-05-17 DIAGNOSIS — E03.9 HYPOTHYROIDISM, UNSPECIFIED TYPE: ICD-10-CM

## 2021-05-17 RX ORDER — LEVOTHYROXINE SODIUM 88 UG/1
TABLET ORAL
Qty: 90 TABLET | Refills: 1 | Status: SHIPPED | OUTPATIENT
Start: 2021-05-17 | End: 2021-12-14

## 2021-09-07 DIAGNOSIS — F32.A DEPRESSIVE DISORDER: ICD-10-CM

## 2021-09-07 RX ORDER — ESCITALOPRAM OXALATE 10 MG/1
TABLET ORAL
Qty: 90 TABLET | Refills: 1 | Status: SHIPPED | OUTPATIENT
Start: 2021-09-07 | End: 2022-03-08

## 2021-09-13 ENCOUNTER — TELEPHONE (OUTPATIENT)
Dept: OBGYN CLINIC | Facility: MEDICAL CENTER | Age: 61
End: 2021-09-13

## 2021-09-13 NOTE — TELEPHONE ENCOUNTER
LMOVM FOR PATIENT TO CALL TO SCHEDULE AN APPT WITH DR JAMES OR GERALD AS THEY SEE SHOULDERS AND DR Cortes Pac DOESN'T

## 2021-09-29 ENCOUNTER — TELEPHONE (OUTPATIENT)
Dept: OBGYN CLINIC | Facility: MEDICAL CENTER | Age: 61
End: 2021-09-29

## 2021-09-29 NOTE — TELEPHONE ENCOUNTER
I called patient to see if she wanted to schedule an appt since she put in an appt request through the [facility] website, but no answer  I did leave a VM to give us a call back to schedule appt if still interested

## 2021-10-11 ENCOUNTER — HOSPITAL ENCOUNTER (OUTPATIENT)
Dept: MAMMOGRAPHY | Facility: MEDICAL CENTER | Age: 61
Discharge: HOME | End: 2021-10-11
Payer: COMMERCIAL

## 2021-10-11 VITALS — HEIGHT: 65 IN | BODY MASS INDEX: 29.66 KG/M2 | WEIGHT: 178 LBS

## 2021-10-11 DIAGNOSIS — Z12.31 SCREENING MAMMOGRAM FOR HIGH-RISK PATIENT: ICD-10-CM

## 2021-10-11 PROCEDURE — 77067 SCR MAMMO BI INCL CAD: CPT

## 2021-10-11 PROCEDURE — 77063 BREAST TOMOSYNTHESIS BI: CPT

## 2021-11-05 ENCOUNTER — RA CDI HCC (OUTPATIENT)
Dept: OTHER | Facility: HOSPITAL | Age: 61
End: 2021-11-05

## 2021-11-06 ENCOUNTER — TELEPHONE (OUTPATIENT)
Dept: FAMILY MEDICINE CLINIC | Facility: CLINIC | Age: 61
End: 2021-11-06

## 2021-11-06 DIAGNOSIS — Z13.1 SCREENING FOR DIABETES MELLITUS: ICD-10-CM

## 2021-11-06 DIAGNOSIS — E03.9 HYPOTHYROIDISM, UNSPECIFIED TYPE: Primary | ICD-10-CM

## 2021-11-06 DIAGNOSIS — Z13.0 SCREENING FOR IRON DEFICIENCY ANEMIA: ICD-10-CM

## 2021-11-06 DIAGNOSIS — E78.5 HYPERLIPIDEMIA, UNSPECIFIED HYPERLIPIDEMIA TYPE: ICD-10-CM

## 2021-11-12 ENCOUNTER — OFFICE VISIT (OUTPATIENT)
Dept: FAMILY MEDICINE CLINIC | Facility: CLINIC | Age: 61
End: 2021-11-12
Payer: COMMERCIAL

## 2021-11-12 VITALS
DIASTOLIC BLOOD PRESSURE: 84 MMHG | TEMPERATURE: 97.5 F | HEIGHT: 65 IN | OXYGEN SATURATION: 97 % | HEART RATE: 99 BPM | RESPIRATION RATE: 16 BRPM | WEIGHT: 175 LBS | BODY MASS INDEX: 29.16 KG/M2 | SYSTOLIC BLOOD PRESSURE: 140 MMHG

## 2021-11-12 DIAGNOSIS — E78.5 HYPERLIPIDEMIA, UNSPECIFIED HYPERLIPIDEMIA TYPE: Primary | ICD-10-CM

## 2021-11-12 DIAGNOSIS — R13.19 ESOPHAGEAL DYSPHAGIA: ICD-10-CM

## 2021-11-12 DIAGNOSIS — E03.9 HYPOTHYROIDISM, UNSPECIFIED TYPE: ICD-10-CM

## 2021-11-12 DIAGNOSIS — K21.9 GASTROESOPHAGEAL REFLUX DISEASE WITHOUT ESOPHAGITIS: ICD-10-CM

## 2021-11-12 DIAGNOSIS — F33.0 MILD RECURRENT MAJOR DEPRESSION (HCC): ICD-10-CM

## 2021-11-12 DIAGNOSIS — I10 ESSENTIAL HYPERTENSION: ICD-10-CM

## 2021-11-12 PROCEDURE — 3725F SCREEN DEPRESSION PERFORMED: CPT | Performed by: FAMILY MEDICINE

## 2021-11-12 PROCEDURE — 1036F TOBACCO NON-USER: CPT | Performed by: FAMILY MEDICINE

## 2021-11-12 PROCEDURE — 99215 OFFICE O/P EST HI 40 MIN: CPT | Performed by: FAMILY MEDICINE

## 2021-11-12 PROCEDURE — 3008F BODY MASS INDEX DOCD: CPT | Performed by: FAMILY MEDICINE

## 2021-11-27 ENCOUNTER — APPOINTMENT (OUTPATIENT)
Dept: LAB | Facility: MEDICAL CENTER | Age: 61
End: 2021-11-27
Payer: COMMERCIAL

## 2021-11-27 DIAGNOSIS — E03.9 HYPOTHYROIDISM, UNSPECIFIED TYPE: ICD-10-CM

## 2021-11-27 DIAGNOSIS — E78.5 HYPERLIPIDEMIA, UNSPECIFIED HYPERLIPIDEMIA TYPE: ICD-10-CM

## 2021-11-27 DIAGNOSIS — Z13.1 SCREENING FOR DIABETES MELLITUS: ICD-10-CM

## 2021-11-27 DIAGNOSIS — Z13.0 SCREENING FOR IRON DEFICIENCY ANEMIA: ICD-10-CM

## 2021-11-27 LAB
ALBUMIN SERPL BCP-MCNC: 3.5 G/DL (ref 3.5–5)
ALP SERPL-CCNC: 102 U/L (ref 46–116)
ALT SERPL W P-5'-P-CCNC: 42 U/L (ref 12–78)
ANION GAP SERPL CALCULATED.3IONS-SCNC: 7 MMOL/L (ref 4–13)
AST SERPL W P-5'-P-CCNC: 39 U/L (ref 5–45)
BASOPHILS # BLD AUTO: 0.05 THOUSANDS/ΜL (ref 0–0.1)
BASOPHILS NFR BLD AUTO: 1 % (ref 0–1)
BILIRUB SERPL-MCNC: 0.5 MG/DL (ref 0.2–1)
BUN SERPL-MCNC: 12 MG/DL (ref 5–25)
CALCIUM SERPL-MCNC: 9.3 MG/DL (ref 8.3–10.1)
CHLORIDE SERPL-SCNC: 106 MMOL/L (ref 100–108)
CHOLEST SERPL-MCNC: 275 MG/DL
CO2 SERPL-SCNC: 24 MMOL/L (ref 21–32)
CREAT SERPL-MCNC: 0.75 MG/DL (ref 0.6–1.3)
EOSINOPHIL # BLD AUTO: 0.19 THOUSAND/ΜL (ref 0–0.61)
EOSINOPHIL NFR BLD AUTO: 4 % (ref 0–6)
ERYTHROCYTE [DISTWIDTH] IN BLOOD BY AUTOMATED COUNT: 12.8 % (ref 11.6–15.1)
EST. AVERAGE GLUCOSE BLD GHB EST-MCNC: 111 MG/DL
GFR SERPL CREATININE-BSD FRML MDRD: 85 ML/MIN/1.73SQ M
GLUCOSE P FAST SERPL-MCNC: 90 MG/DL (ref 65–99)
HBA1C MFR BLD: 5.5 %
HCT VFR BLD AUTO: 46.7 % (ref 34.8–46.1)
HDLC SERPL-MCNC: 74 MG/DL
HGB BLD-MCNC: 15.1 G/DL (ref 11.5–15.4)
IMM GRANULOCYTES # BLD AUTO: 0.01 THOUSAND/UL (ref 0–0.2)
IMM GRANULOCYTES NFR BLD AUTO: 0 % (ref 0–2)
LDLC SERPL CALC-MCNC: 167 MG/DL (ref 0–100)
LYMPHOCYTES # BLD AUTO: 1.89 THOUSANDS/ΜL (ref 0.6–4.47)
LYMPHOCYTES NFR BLD AUTO: 42 % (ref 14–44)
MCH RBC QN AUTO: 30.5 PG (ref 26.8–34.3)
MCHC RBC AUTO-ENTMCNC: 32.3 G/DL (ref 31.4–37.4)
MCV RBC AUTO: 94 FL (ref 82–98)
MONOCYTES # BLD AUTO: 0.46 THOUSAND/ΜL (ref 0.17–1.22)
MONOCYTES NFR BLD AUTO: 10 % (ref 4–12)
NEUTROPHILS # BLD AUTO: 1.92 THOUSANDS/ΜL (ref 1.85–7.62)
NEUTS SEG NFR BLD AUTO: 43 % (ref 43–75)
NRBC BLD AUTO-RTO: 0 /100 WBCS
PLATELET # BLD AUTO: 308 THOUSANDS/UL (ref 149–390)
PMV BLD AUTO: 10 FL (ref 8.9–12.7)
POTASSIUM SERPL-SCNC: 3.6 MMOL/L (ref 3.5–5.3)
PROT SERPL-MCNC: 7.3 G/DL (ref 6.4–8.2)
RBC # BLD AUTO: 4.95 MILLION/UL (ref 3.81–5.12)
SODIUM SERPL-SCNC: 137 MMOL/L (ref 136–145)
TRIGL SERPL-MCNC: 169 MG/DL
TSH SERPL DL<=0.05 MIU/L-ACNC: 1.71 UIU/ML (ref 0.36–3.74)
WBC # BLD AUTO: 4.52 THOUSAND/UL (ref 4.31–10.16)

## 2021-11-27 PROCEDURE — 80053 COMPREHEN METABOLIC PANEL: CPT

## 2021-11-27 PROCEDURE — 80061 LIPID PANEL: CPT

## 2021-11-27 PROCEDURE — 84443 ASSAY THYROID STIM HORMONE: CPT

## 2021-11-27 PROCEDURE — 83036 HEMOGLOBIN GLYCOSYLATED A1C: CPT

## 2021-11-27 PROCEDURE — 36415 COLL VENOUS BLD VENIPUNCTURE: CPT

## 2021-11-27 PROCEDURE — 85025 COMPLETE CBC W/AUTO DIFF WBC: CPT

## 2021-12-01 ENCOUNTER — TELEPHONE (OUTPATIENT)
Dept: FAMILY MEDICINE CLINIC | Facility: CLINIC | Age: 61
End: 2021-12-01

## 2021-12-14 DIAGNOSIS — E03.9 HYPOTHYROIDISM, UNSPECIFIED TYPE: ICD-10-CM

## 2021-12-14 RX ORDER — LEVOTHYROXINE SODIUM 88 UG/1
TABLET ORAL
Qty: 90 TABLET | Refills: 3 | Status: SHIPPED | OUTPATIENT
Start: 2021-12-14

## 2022-01-10 ENCOUNTER — APPOINTMENT (OUTPATIENT)
Dept: RADIOLOGY | Facility: MEDICAL CENTER | Age: 62
End: 2022-01-10
Payer: OTHER MISCELLANEOUS

## 2022-01-10 ENCOUNTER — OFFICE VISIT (OUTPATIENT)
Dept: OBGYN CLINIC | Facility: MEDICAL CENTER | Age: 62
End: 2022-01-10
Payer: OTHER MISCELLANEOUS

## 2022-01-10 VITALS
DIASTOLIC BLOOD PRESSURE: 80 MMHG | SYSTOLIC BLOOD PRESSURE: 120 MMHG | WEIGHT: 175 LBS | TEMPERATURE: 97.5 F | BODY MASS INDEX: 29.16 KG/M2 | HEART RATE: 108 BPM | HEIGHT: 65 IN

## 2022-01-10 DIAGNOSIS — M25.551 PAIN IN RIGHT HIP: Primary | ICD-10-CM

## 2022-01-10 DIAGNOSIS — M25.551 PAIN IN RIGHT HIP: ICD-10-CM

## 2022-01-10 DIAGNOSIS — M16.11 ARTHRITIS OF RIGHT HIP: ICD-10-CM

## 2022-01-10 PROCEDURE — 73502 X-RAY EXAM HIP UNI 2-3 VIEWS: CPT

## 2022-01-10 PROCEDURE — 99214 OFFICE O/P EST MOD 30 MIN: CPT | Performed by: ORTHOPAEDIC SURGERY

## 2022-01-10 RX ORDER — DICLOFENAC SODIUM 75 MG/1
75 TABLET, DELAYED RELEASE ORAL 2 TIMES DAILY
Qty: 60 TABLET | Refills: 1 | Status: SHIPPED | OUTPATIENT
Start: 2022-01-10

## 2022-01-10 RX ORDER — METHYLPREDNISOLONE 4 MG/1
TABLET ORAL
Qty: 1 TABLET | Refills: 0 | Status: SHIPPED | OUTPATIENT
Start: 2022-01-10

## 2022-01-10 NOTE — PROGRESS NOTES
Assessment/Plan     1  Pain in right hip    2  Arthritis of right hip      Orders Placed This Encounter   Procedures    XR hip/pelv 2-3 vws right if performed    Ambulatory referral to Physical Therapy     · Patient has right hip  pain with possible labrum tear and possible abductor tear  · Discussed with patient conservative treatments: steroid injections, physical therapy, medications,   · PT order was given out today   · Prescribed patient Medrol dose osmar  She is aware not to take Meloxicam, Aleve or Diclofenac while on the Medrol dose osmar  She is also aware to take extra precautions due to the steroid might suppress her immune system  Prescribed patient Diclofenac prn pain, medications warnings were reviewed with patient  · She may add in Tylenol 1000 mg as needed for pain  She may take tylenol with the medrol dose osmar and with  Diclofenac  · May consider ordering MRI arthrogram at the next office visit if pain persists  Return in about 6 years (around 1/10/2028) for Right hip   I answered all of the patient's questions during the visit and provided education of the patient's condition during the visit  The patient verbalized understanding of the information given and agrees with the plan  This note was dictated using Invenshure software  It may contain errors including improperly dictated words  Please contact physician directly for any questions  History of Present Illness   Chief complaint:   Chief Complaint   Patient presents with    Right Hip - Pain       HPI: Yan Smith is a 59 y o  female that c/o right hip pain  She was referred by her PCP Dr Crystal Herbert   She states she had an injury at work on 12/23/2021  She states she was caring a box a file and twisted the knee and felt a sharp pain in the right buttock, right posterior lateral hip and radiating down to the knee  She states the pain is constant throbbing at this point  Denies any numbness or tingling  Pain is worse with pivoting, walking, and going up steps  She tired taking Meloxicam with no relief  She is currently taking Advil as needed for pain with some relief  She also have been doing stretching exercises and heat as needed for pain  She has no history having injections, physical therapy or surgeries on the right hip  ROS:    See HPI for musculoskeletal review  All other systems reviewed are negative     Historical Information   Past Medical History:   Diagnosis Date    Chronic cough     Depression     GERD (gastroesophageal reflux disease)     Hiatal hernia     resolved 12/7/16    Hyperlipidemia     Insomnia     Post herpetic neuralgia     last assessed 7/13/16, resolved 12/7/16    Sacroiliac pain      Past Surgical History:   Procedure Laterality Date    COLONOSCOPY      EAR SURGERY      GASTRIC BYPASS      HYSTERECTOMY      OOPHORECTOMY Bilateral     [place] COLONOSCOPY FLX DX W/COLLJ SPEC WHEN PFRMD N/A 2/5/2019    Procedure: COLONOSCOPY;  Surgeon: Khloe Sanchez MD;  Location: [facility];   Service: General     Social History   Social History     Substance and Sexual Activity   Alcohol Use Yes    Comment: SOCIAL     Social History     Substance and Sexual Activity   Drug Use No     Social History     Tobacco Use   Smoking Status Former Smoker   Smokeless Tobacco Never Used     Family History:   Family History   Problem Relation Age of Onset    Diabetes Mother     Lymphoma Mother 76        non-hodgkins    Breast cancer Sister 48    Stroke Father     No Known Problems Daughter     Breast cancer Maternal Grandmother         age at onset unknown    No Known Problems Maternal Grandfather     Breast cancer Paternal Grandmother         age at onset unknown    No Known Problems Paternal Grandfather     No Known Problems Daughter     Breast cancer Maternal Aunt         age unknown    No Known Problems Maternal Aunt     No Known Problems Maternal Aunt     No Known Problems Paternal Aunt     No Known Problems Paternal Aunt     No Known Problems Paternal Aunt        Current Outpatient Medications on File Prior to Visit   Medication Sig Dispense Refill    B Complex-C CAPS Take by mouth      Biotin 1 MG CAPS BID      Calcium Carb-Cholecalciferol (CALCIUM 1000 + D PO) 1,000 mg BID      escitalopram (LEXAPRO) 10 mg tablet TAKE 1 TABLET DAILY 90 tablet 1    Fish Oil-Krill Oil (KRILL OIL PLUS) CAPS Take by mouth      levothyroxine 88 mcg tablet TAKE 1 TABLET DAILY 90 tablet 3    Multiple Vitamins-Minerals (CENTRUM SILVER ADULT 50+ PO) Take by mouth      niacin 100 mg tablet niacin      omeprazole (PriLOSEC) 20 mg delayed release capsule Take 1 capsule (20 mg total) by mouth daily before breakfast 30 capsule 1     No current facility-administered medications on file prior to visit  Allergies   Allergen Reactions    Amoxicillin-Pot Clavulanate Facial Swelling and Swelling     Lip swelling       Objective   Vitals: Blood pressure 120/80, pulse (!) 108, temperature 97 5 °F (36 4 °C), height 5' 4 5" (1 638 m), weight 79 4 kg (175 lb), not currently breastfeeding  ,Body mass index is 29 57 kg/m²  PE:  AAOx 3  WDWN  Hearing intact, no drainage from eyes  Regular rate  no audible wheezing  no abdominal distension  LE compartments soft, skin intact    right hip:   No dislocation/deformity  +   Stinchfield  ROM: FF 0-90  ER 0-45   IR 0-30   Pain with ROM   + Adry Test  + Impingement test  No TTP over greater trochanter  Abduction: 4/5/ pain with resistant abduction  Limited due to pain   Neg  Gadiel's test  No TTP over SIJ    bilateralLE:    RLE:  EHL/AT/GS/quads/hamstrings/iliopsoas 5/5, sensation grossly intact L4, L5, S1, palpable pedal pulse    LLE:  EHL/AT/GS/quads/hamstrings/iliopsoas 5/5, sensation grossly intact L4, L5, S1, palpable pedal pulse    Back:    No TTP over lumbar spinous processes, paraspinal musculature  SLR: Neg       Imaging Studies: I have personally reviewed pertinent films in PACS  righthip:  Mild ÁNGEL       Scribe Attestation    I,:  Hieu Grier am acting as a scribe while in the presence of the attending physician :       I,:  Ponce Lazo, DO personally performed the services described in this documentation    as scribed in my presence :

## 2022-01-17 PROCEDURE — U0003 INFECTIOUS AGENT DETECTION BY NUCLEIC ACID (DNA OR RNA); SEVERE ACUTE RESPIRATORY SYNDROME CORONAVIRUS 2 (SARS-COV-2) (CORONAVIRUS DISEASE [COVID-19]), AMPLIFIED PROBE TECHNIQUE, MAKING USE OF HIGH THROUGHPUT TECHNOLOGIES AS DESCRIBED BY CMS-2020-01-R: HCPCS | Performed by: FAMILY MEDICINE

## 2022-01-17 PROCEDURE — U0005 INFEC AGEN DETEC AMPLI PROBE: HCPCS | Performed by: FAMILY MEDICINE

## 2022-02-15 ENCOUNTER — TELEPHONE (OUTPATIENT)
Dept: OBGYN CLINIC | Facility: MEDICAL CENTER | Age: 62
End: 2022-02-15

## 2022-02-15 NOTE — TELEPHONE ENCOUNTER
Work note was placed by Dr Haylie Flaherty  There was no fax number on the original task for the work comp  I called & LVM for patient to call back with the [facility] fax #       CB# [phone]

## 2022-02-21 ENCOUNTER — OFFICE VISIT (OUTPATIENT)
Dept: OBGYN CLINIC | Facility: MEDICAL CENTER | Age: 62
End: 2022-02-21
Payer: OTHER MISCELLANEOUS

## 2022-02-21 VITALS
BODY MASS INDEX: 28.99 KG/M2 | WEIGHT: 174 LBS | HEIGHT: 65 IN | DIASTOLIC BLOOD PRESSURE: 87 MMHG | HEART RATE: 98 BPM | SYSTOLIC BLOOD PRESSURE: 147 MMHG | TEMPERATURE: 97.8 F

## 2022-02-21 DIAGNOSIS — M25.551 PAIN IN RIGHT HIP: Primary | ICD-10-CM

## 2022-02-21 DIAGNOSIS — S76.011D MUSCLE STRAIN OF RIGHT GLUTEAL REGION, SUBSEQUENT ENCOUNTER: ICD-10-CM

## 2022-02-21 PROCEDURE — 99213 OFFICE O/P EST LOW 20 MIN: CPT | Performed by: ORTHOPAEDIC SURGERY

## 2022-02-21 NOTE — PROGRESS NOTES
Assessment/Plan     1  Pain in right hip    2  Muscle strain of right gluteal region, subsequent encounter      No orders of the defined types were placed in this encounter  · Patient is improving  · We will hold off on advanced imaging for now  · Continue PT  May transition to home exercises  · May take diclofenac tabs prn pain  · We will recheck right hip in 6 weeks to clear from work injury  Return in about 6 weeks (around 4/4/2022) for recheck right hip  I answered all of the patient's questions during the visit and provided education of the patient's condition during the visit  The patient verbalized understanding of the information given and agrees with the plan  This note was dictated using Nanoledge software  It may contain errors including improperly dictated words  Please contact physician directly for any questions  Subjective   Chief Complaint:   Chief Complaint   Patient presents with    Right Hip - Follow-up       HPI:  Alison Cordova is a 59 y o  female who presents for follow up for right hip pain after work injury on 12/23/2021  Patient notes she feels 75% better since her last appointment  She notes she only has pain if going up the steps, and otherwise she feels much better  Pain is located over the buttock and lateral hip area  denies groin pain  Patient is not taking anything for pain currently  She has been going to outpatient PT twice per week and notes improvement  Patient is working full duty at work without issues  Review of Systems  See HPI for musculoskeletal review     All other systems reviewed are negative     History:  Past Medical History:   Diagnosis Date    Chronic cough     Depression     GERD (gastroesophageal reflux disease)     Hiatal hernia     resolved 12/7/16    Hyperlipidemia     Insomnia     Post herpetic neuralgia     last assessed 7/13/16, resolved 12/7/16    Sacroiliac pain      Past Surgical History:   Procedure Laterality Date    COLONOSCOPY      EAR SURGERY      GASTRIC BYPASS      HYSTERECTOMY      OOPHORECTOMY Bilateral     [place] COLONOSCOPY FLX DX W/COLLJ SPEC WHEN PFRMD N/A 2/5/2019    Procedure: COLONOSCOPY;  Surgeon: Guicho Sanchez MD;  Location: [facility];   Service: General     Social History   Social History     Substance and Sexual Activity   Alcohol Use Yes    Comment: SOCIAL     Social History     Substance and Sexual Activity   Drug Use No     Social History     Tobacco Use   Smoking Status Former Smoker   Smokeless Tobacco Never Used     Family History:   Family History   Problem Relation Age of Onset    Diabetes Mother     Lymphoma Mother 76        non-hodgkins    Breast cancer Sister 48    Stroke Father     No Known Problems Daughter     Breast cancer Maternal Grandmother         age at onset unknown    No Known Problems Maternal Grandfather     Breast cancer Paternal Grandmother         age at onset unknown    No Known Problems Paternal Grandfather     No Known Problems Daughter     Breast cancer Maternal Aunt         age unknown    No Known Problems Maternal Aunt     No Known Problems Maternal Aunt     No Known Problems Paternal Aunt     No Known Problems Paternal Aunt     No Known Problems Paternal Aunt        Current Outpatient Medications on File Prior to Visit   Medication Sig Dispense Refill    Biotin 1 MG CAPS BID      Calcium Carb-Cholecalciferol (CALCIUM 1000 + D PO) 1,000 mg BID      diclofenac (VOLTAREN) 75 mg EC tablet Take 1 tablet (75 mg total) by mouth 2 (two) times a day PRN for pain 60 tablet 1    escitalopram (LEXAPRO) 10 mg tablet TAKE 1 TABLET DAILY 90 tablet 1    Fish Oil-Krill Oil (KRILL OIL PLUS) CAPS Take by mouth      levothyroxine 88 mcg tablet TAKE 1 TABLET DAILY 90 tablet 3    methylPREDNISolone 4 MG tablet therapy pack Use as directed on package 1 tablet 0    Multiple Vitamins-Minerals (CENTRUM SILVER ADULT 50+ PO) Take by mouth      niacin 100 mg tablet niacin      omeprazole (PriLOSEC) 20 mg delayed release capsule Take 1 capsule (20 mg total) by mouth daily before breakfast 30 capsule 1    [DISCONTINUED] B Complex-C CAPS Take by mouth       No current facility-administered medications on file prior to visit  Allergies   Allergen Reactions    Amoxicillin-Pot Clavulanate Facial Swelling and Swelling     Lip swelling        Objective     /87   Pulse 98   Temp 97 8 °F (36 6 °C)   Ht 5' 4 5" (1 638 m)   Wt 78 9 kg (174 lb)   LMP  (LMP Unknown)   BMI 29 41 kg/m²      PE:  AAOx 3  WDWN  Hearing intact, no drainage from eyes  no audible wheezing  no abdominal distension  LE compartments soft, skin intact    right hip:   No dislocation/deformity  Neg  Stinchfield  ROM: full without pain  Neg  Adry Test  Neg  Impingement test  + mild TTP over greater trochanter, glute tendons  Abduction: 5/5  Neg   Gadiel's test  + TTP over SIJ    AT/GS intact

## 2022-02-23 ENCOUNTER — TELEPHONE (OUTPATIENT)
Dept: OBGYN CLINIC | Facility: MEDICAL CENTER | Age: 62
End: 2022-02-23

## 2022-02-23 NOTE — TELEPHONE ENCOUNTER
Called & LVM for patient to call back with a fax # to fax work status letter to her WC  No answer   Left callback # [phone]

## 2022-03-08 DIAGNOSIS — F32.A DEPRESSIVE DISORDER: ICD-10-CM

## 2022-03-08 RX ORDER — ESCITALOPRAM OXALATE 10 MG/1
TABLET ORAL
Qty: 90 TABLET | Refills: 3 | Status: SHIPPED | OUTPATIENT
Start: 2022-03-08

## 2022-03-09 ENCOUNTER — OFFICE VISIT (OUTPATIENT)
Dept: GASTROENTEROLOGY | Facility: MEDICAL CENTER | Age: 62
End: 2022-03-09
Payer: COMMERCIAL

## 2022-03-09 VITALS
SYSTOLIC BLOOD PRESSURE: 134 MMHG | HEART RATE: 93 BPM | BODY MASS INDEX: 29.51 KG/M2 | TEMPERATURE: 99.5 F | DIASTOLIC BLOOD PRESSURE: 84 MMHG | WEIGHT: 174.6 LBS

## 2022-03-09 DIAGNOSIS — K21.9 GASTROESOPHAGEAL REFLUX DISEASE WITHOUT ESOPHAGITIS: ICD-10-CM

## 2022-03-09 DIAGNOSIS — R13.19 ESOPHAGEAL DYSPHAGIA: ICD-10-CM

## 2022-03-09 PROCEDURE — 99243 OFF/OP CNSLTJ NEW/EST LOW 30: CPT | Performed by: INTERNAL MEDICINE

## 2022-03-09 NOTE — PATIENT INSTRUCTIONS
Scheduled date of EGD (as of today) 6/9/22  Physician performing Dr Santi Ba  Location of procedure  [facility]  Bowel prep reviewed with patient: juarez  Instructions reviewed with patient by: juarez  Clearances: na

## 2022-03-09 NOTE — PROGRESS NOTES
[facility] - Outpatient Consultation  Masoud Begum 59 y o  female MRN: [id number]  Encounter: [id number]    HPI:    Masoud Begum is a 59 y o  female with history of gastric bypass over 10 years ago, hiatal hernia, and history of GERD who presents with worsening GERD and discomfort and fullness with swallowing  Her current symptoms started approximately 2 months ago  She has developed GERD which had been under control since her bypass surgery  She also describes a sensation of fullness when she swallows and difficulty getting the bolus to move down her chest and into the stomach  She feels like the food gets stuck and she has to swallow multiple times  She also has burning in the chest      She started taking omeprazole 3-4 times per week and it is helping approximately 50%  No nausea  She does have early satiety  Her weight is stable  She does not smoke  No FH of upper GI cancers  Last EGD was 20 years ago  She was taking NSAIDs for a back injury but stopped 4 weeks ago  She had colonoscopy in 2019 which showed diverticulosis and hemorrhoids;  was instructed to return in 5 years  REVIEW OF SYSTEMS:  CONSTITUTIONAL: Denies any fever, chills, rigors, and weight loss  HEENT: No earache or tinnitus  Denies hearing loss or visual disturbances  CARDIOVASCULAR: No chest pain or palpitations  RESPIRATORY: Denies any cough, hemoptysis, shortness of breath or dyspnea on exertion  GASTROINTESTINAL: As noted in the History of Present Illness  GENITOURINARY: No problems with urination  Denies any hematuria or dysuria  NEUROLOGIC: No dizziness or vertigo, denies headaches  MUSCULOSKELETAL: Denies any muscle or joint pain  SKIN: Denies skin rashes or itching  ENDOCRINE: Denies excessive thirst  Denies intolerance to heat or cold  PSYCHOSOCIAL: Denies depression or anxiety  Denies any recent memory loss       Historical Information   Past Medical History: Diagnosis Date    Chronic cough     Depression     GERD (gastroesophageal reflux disease)     Hiatal hernia     resolved 12/7/16    Hyperlipidemia     Insomnia     Post herpetic neuralgia     last assessed 7/13/16, resolved 12/7/16    Sacroiliac pain      Past Surgical History:   Procedure Laterality Date    COLONOSCOPY      EAR SURGERY      GASTRIC BYPASS      HYSTERECTOMY      OOPHORECTOMY Bilateral     [place] COLONOSCOPY FLX DX W/COLLJ SPEC WHEN PFRMD N/A 2/5/2019    Procedure: COLONOSCOPY;  Surgeon: Miguelina Tyler MD;  Location: [facility];   Service: General     Social History   Social History     Substance and Sexual Activity   Alcohol Use Yes    Comment: SOCIAL     Social History     Substance and Sexual Activity   Drug Use No     Social History     Tobacco Use   Smoking Status Former Smoker   Smokeless Tobacco Never Used     Family History   Problem Relation Age of Onset    Diabetes Mother     Lymphoma Mother 76        non-hodgkins    Breast cancer Sister 48    Stroke Father     No Known Problems Daughter     Breast cancer Maternal Grandmother         age at onset unknown    No Known Problems Maternal Grandfather     Breast cancer Paternal Grandmother         age at onset unknown    No Known Problems Paternal Grandfather     No Known Problems Daughter     Breast cancer Maternal Aunt         age unknown    No Known Problems Maternal Aunt     No Known Problems Maternal Aunt     No Known Problems Paternal Aunt     No Known Problems Paternal Aunt     No Known Problems Paternal Aunt        Meds/Allergies       Current Outpatient Medications:     Biotin 1 MG CAPS    Calcium Carb-Cholecalciferol (CALCIUM 1000 + D PO)    diclofenac (VOLTAREN) 75 mg EC tablet    escitalopram (LEXAPRO) 10 mg tablet    Fish Oil-Krill Oil (KRILL OIL PLUS) CAPS    levothyroxine 88 mcg tablet    methylPREDNISolone 4 MG tablet therapy pack    Multiple Vitamins-Minerals (CENTRUM SILVER ADULT 50+ PO)   niacin 100 mg tablet    omeprazole (PriLOSEC) 20 mg delayed release capsule    Allergies   Allergen Reactions    Amoxicillin-Pot Clavulanate Facial Swelling and Swelling     Lip swelling       Objective   Blood pressure 134/84, pulse 93, temperature 99 5 °F (37 5 °C), weight 79 2 kg (174 lb 9 6 oz), not currently breastfeeding  Body mass index is 29 51 kg/m²  PHYSICAL EXAM:    General Appearance:   Alert, cooperative, no distress   HEENT:   Normocephalic, atraumatic, anicteric  Neck:  Supple, symmetrical, trachea midline   Lungs:   Clear to auscultation bilaterally; no rales, rhonchi or wheezing; respirations unlabored    Heart[de-identified]   Regular rate and rhythm; no murmur, rub, or gallop  Abdomen:   Soft, mild epigastric discomfort to palpation, non-distended; normal bowel sounds; no masses, no organomegaly    Genitalia:   Deferred    Rectal:   Deferred    Extremities:  No cyanosis, clubbing or edema    Pulses:  2+ and symmetric    Skin:  No jaundice, rashes, or lesions    Lymph nodes:  No palpable cervical lymphadenopathy        Lab Results:   No visits with results within 1 Day(s) from this visit     Latest known visit with results is:   Orders Only on 01/17/2022   Component Date Value    SARS-CoV-2 01/17/2022 Positive*       Lab Results   Component Value Date    WBC 4 52 11/27/2021    HGB 15 1 11/27/2021    HCT 46 7 (H) 11/27/2021    MCV 94 11/27/2021     11/27/2021       Lab Results   Component Value Date     12/19/2015    SODIUM 137 11/27/2021    K 3 6 11/27/2021     11/27/2021    CO2 24 11/27/2021    ANIONGAP 9 12/19/2015    AGAP 7 11/27/2021    BUN 12 11/27/2021    CREATININE 0 75 11/27/2021    GLUC 81 12/10/2018    GLUF 90 11/27/2021    CALCIUM 9 3 11/27/2021    AST 39 11/27/2021    ALT 42 11/27/2021    ALKPHOS 102 11/27/2021    PROT 7 5 12/19/2015    TP 7 3 11/27/2021    BILITOT 0 41 12/19/2015    TBILI 0 50 11/27/2021    EGFR 85 11/27/2021       No results found for: CRP    Lab Results   Component Value Date    RQE2RYVZXPSC 1 710 11/27/2021       No results found for: IRON, TIBC, FERRITIN    Radiology Results:   No results found  ______________________________________________________________________  ASSESSMENT AND PLAN:    Mani Feldman is a 59 y o  female with history of of gastric bypass surgery who presents with new symptoms of fairly severe GERD, chest discomfort, and dysphagia  She reports history of hiatal hernia  She has not had upper endoscopy in many years  She was taking NSAIDs  Given her symptoms, we are going to schedule upper endoscopy to investigate the possibility of esophagitis, peptic stricture, Schatzki's ring, Connolly's, anastomotic stricture or ulcer, malignancy  I have asked her to take her PPI daily  She is no longer taking NSAIDs  I discussed the risks of bleeding, infection, and perforation associated with endoscopic procedures  1  Gastroesophageal reflux disease without esophagitis    2   Esophageal dysphagia        Orders Placed This Encounter   Procedures    EGD

## 2022-05-01 ENCOUNTER — NURSE TRIAGE (OUTPATIENT)
Dept: OTHER | Facility: OTHER | Age: 62
End: 2022-05-01

## 2022-05-01 DIAGNOSIS — F41.9 ANXIETY: Primary | ICD-10-CM

## 2022-05-01 RX ORDER — HYDROXYZINE 50 MG/1
50 TABLET, FILM COATED ORAL 3 TIMES DAILY PRN
Qty: 10 TABLET | Refills: 0 | Status: SHIPPED | OUTPATIENT
Start: 2022-05-01

## 2022-05-02 NOTE — TELEPHONE ENCOUNTER
Regarding: Dejon Anne are discussing end of life for my grandmother and my mother is not handling it will   ----- Message from Maico Lee sent at 5/1/2022  8:03 PM EDT -----  '' I was wondering if I can get a prescription order for xanax for my mom the doctors are discussing end of life of her mother and she is not handling with well

## 2022-05-02 NOTE — TELEPHONE ENCOUNTER
Her mother has very unexpectedly had complication after open heart surgery and is expected to pass away shortly  Fabian Aden has started with uncontrollable shaking, vomiting, and is understandable extremely upset  Questioning if anything can be prescribed to help with anxiety at this time  On call provider notified  Per Provider Atarax 50mg can be prescribed  Patient is to follow up with PCP if medication needs to be continued for a lengthier time

## 2022-05-02 NOTE — TELEPHONE ENCOUNTER
Reason for Disposition   Patient sounds very sick or weak to the triager    Answer Assessment - Initial Assessment Questions  1  CONCERN: "What happened that made you call today?"      Mother is dying  2  ANXIETY SYMPTOM SCREENING: "Can you describe how you have been feeling?"  (e g , tense, restless, panicky, anxious, keyed up, trouble sleeping, trouble concentrating)      anxious  3  ONSET: "How long have you been feeling this way?"      This evening  4  RECURRENT: "Have you felt this way before?"  If Yes, ask: "What happened that time?" "What helped these feelings go away in the past?"       no  5  RISK OF HARM - SUICIDAL IDEATION:  "Do you ever have thoughts of hurting or killing yourself?"  (e g , yes, no, no but preoccupation with thoughts about death)    - INTENT:  "Do you have thoughts of hurting or killing yourself right NOW?" (e g , yes, no, N/A)    - PLAN: "Do you have a specific plan for how you would do this?" (e g , gun, knife, overdose, no plan, N/A)      unknown  6  RISK OF HARM - HOMICIDAL IDEATION:  "Do you ever have thoughts of hurting or killing someone else?"  (e g , yes, no, no but preoccupation with thoughts about death)    - INTENT:  "Do you have thoughts of hurting or killing someone right NOW?" (e g , yes, no, N/A)    - PLAN: "Do you have a specific plan for how you would do this?" (e g , gun, knife, no plan, N/A)       unknown  10  STRESSORS: "Has there been any new stress or recent changes in your life?"        Mother is dying  15  OTHER SYMPTOMS: "Do you have any other physical symptoms right now?" (e g , chest pain, palpitations, difficulty breathing, fever)        Shaking, vomiting  14   PREGNANCY: "Is there any chance you are pregnant?" "When was your last menstrual period?"        n/a    Protocols used: ANXIETY AND PANIC ATTACK-ADULT-

## 2022-05-10 ENCOUNTER — TELEPHONE (OUTPATIENT)
Dept: FAMILY MEDICINE CLINIC | Facility: CLINIC | Age: 62
End: 2022-05-10

## 2022-05-10 NOTE — TELEPHONE ENCOUNTER
Pt MYRTLE stating on Friday she will be going to do her routine BW at   Please place orders and call when in chart

## 2022-05-11 NOTE — TELEPHONE ENCOUNTER
Called and notified patient of ordered labs  Patient verbalized understanding and states she will complete labs tomorrow morning  Patient was thankful for call

## 2022-05-11 NOTE — TELEPHONE ENCOUNTER
Patient called again very anxious for the labs to be put in the system  Her appt is 8:00 on Friday morning and is frustrated because she took time off of work for this and she said she took time off of work before and it happened before that the orders were not in the system  She has the labs done at [facility] on [address]

## 2022-05-12 ENCOUNTER — APPOINTMENT (OUTPATIENT)
Dept: LAB | Facility: CLINIC | Age: 62
End: 2022-05-12
Payer: COMMERCIAL

## 2022-05-12 DIAGNOSIS — F41.9 ANXIETY: ICD-10-CM

## 2022-05-12 DIAGNOSIS — Z13.1 SCREENING FOR DIABETES MELLITUS: ICD-10-CM

## 2022-05-12 DIAGNOSIS — F33.0 MILD RECURRENT MAJOR DEPRESSION (HCC): ICD-10-CM

## 2022-05-12 DIAGNOSIS — E03.9 HYPOTHYROIDISM, UNSPECIFIED TYPE: ICD-10-CM

## 2022-05-12 DIAGNOSIS — Z13.0 SCREENING FOR IRON DEFICIENCY ANEMIA: ICD-10-CM

## 2022-05-12 DIAGNOSIS — E78.5 HYPERLIPIDEMIA, UNSPECIFIED HYPERLIPIDEMIA TYPE: ICD-10-CM

## 2022-05-12 DIAGNOSIS — I10 ESSENTIAL HYPERTENSION: ICD-10-CM

## 2022-05-12 DIAGNOSIS — F32.A DEPRESSIVE DISORDER: ICD-10-CM

## 2022-05-12 LAB
ALBUMIN SERPL BCP-MCNC: 3.4 G/DL (ref 3.5–5)
ALP SERPL-CCNC: 100 U/L (ref 46–116)
ALT SERPL W P-5'-P-CCNC: 39 U/L (ref 12–78)
ANION GAP SERPL CALCULATED.3IONS-SCNC: 7 MMOL/L (ref 4–13)
AST SERPL W P-5'-P-CCNC: 34 U/L (ref 5–45)
BILIRUB SERPL-MCNC: 0.37 MG/DL (ref 0.2–1)
BUN SERPL-MCNC: 10 MG/DL (ref 5–25)
CALCIUM ALBUM COR SERPL-MCNC: 9.6 MG/DL (ref 8.3–10.1)
CALCIUM SERPL-MCNC: 9.1 MG/DL (ref 8.3–10.1)
CHLORIDE SERPL-SCNC: 109 MMOL/L (ref 100–108)
CHOLEST SERPL-MCNC: 248 MG/DL
CO2 SERPL-SCNC: 24 MMOL/L (ref 21–32)
CREAT SERPL-MCNC: 0.68 MG/DL (ref 0.6–1.3)
ERYTHROCYTE [DISTWIDTH] IN BLOOD BY AUTOMATED COUNT: 12.8 % (ref 11.6–15.1)
EST. AVERAGE GLUCOSE BLD GHB EST-MCNC: 103 MG/DL
GFR SERPL CREATININE-BSD FRML MDRD: 93 ML/MIN/1.73SQ M
GLUCOSE P FAST SERPL-MCNC: 79 MG/DL (ref 65–99)
HBA1C MFR BLD: 5.2 %
HCT VFR BLD AUTO: 43.1 % (ref 34.8–46.1)
HDLC SERPL-MCNC: 63 MG/DL
HGB BLD-MCNC: 14.2 G/DL (ref 11.5–15.4)
LDLC SERPL CALC-MCNC: 125 MG/DL (ref 0–100)
MCH RBC QN AUTO: 30.9 PG (ref 26.8–34.3)
MCHC RBC AUTO-ENTMCNC: 32.9 G/DL (ref 31.4–37.4)
MCV RBC AUTO: 94 FL (ref 82–98)
PLATELET # BLD AUTO: 287 THOUSANDS/UL (ref 149–390)
PMV BLD AUTO: 9.9 FL (ref 8.9–12.7)
POTASSIUM SERPL-SCNC: 4 MMOL/L (ref 3.5–5.3)
PROT SERPL-MCNC: 7.2 G/DL (ref 6.4–8.2)
RBC # BLD AUTO: 4.59 MILLION/UL (ref 3.81–5.12)
SODIUM SERPL-SCNC: 140 MMOL/L (ref 136–145)
TRIGL SERPL-MCNC: 299 MG/DL
TSH SERPL DL<=0.05 MIU/L-ACNC: 1.47 UIU/ML (ref 0.45–4.5)
WBC # BLD AUTO: 4.2 THOUSAND/UL (ref 4.31–10.16)

## 2022-05-12 PROCEDURE — 84443 ASSAY THYROID STIM HORMONE: CPT

## 2022-05-12 PROCEDURE — 83036 HEMOGLOBIN GLYCOSYLATED A1C: CPT

## 2022-05-12 PROCEDURE — 80053 COMPREHEN METABOLIC PANEL: CPT

## 2022-05-12 PROCEDURE — 85027 COMPLETE CBC AUTOMATED: CPT

## 2022-05-12 PROCEDURE — 36415 COLL VENOUS BLD VENIPUNCTURE: CPT

## 2022-05-12 PROCEDURE — 80061 LIPID PANEL: CPT

## 2022-05-13 ENCOUNTER — OFFICE VISIT (OUTPATIENT)
Dept: FAMILY MEDICINE CLINIC | Facility: CLINIC | Age: 62
End: 2022-05-13
Payer: COMMERCIAL

## 2022-05-13 VITALS
TEMPERATURE: 98.8 F | RESPIRATION RATE: 18 BRPM | OXYGEN SATURATION: 98 % | SYSTOLIC BLOOD PRESSURE: 120 MMHG | DIASTOLIC BLOOD PRESSURE: 70 MMHG | WEIGHT: 172.4 LBS | BODY MASS INDEX: 28.72 KG/M2 | HEART RATE: 71 BPM | HEIGHT: 65 IN

## 2022-05-13 DIAGNOSIS — K21.9 GASTROESOPHAGEAL REFLUX DISEASE WITHOUT ESOPHAGITIS: ICD-10-CM

## 2022-05-13 DIAGNOSIS — E78.5 HYPERLIPIDEMIA, UNSPECIFIED HYPERLIPIDEMIA TYPE: ICD-10-CM

## 2022-05-13 DIAGNOSIS — I10 ESSENTIAL HYPERTENSION: Primary | ICD-10-CM

## 2022-05-13 DIAGNOSIS — E03.9 HYPOTHYROIDISM, UNSPECIFIED TYPE: ICD-10-CM

## 2022-05-13 DIAGNOSIS — F33.0 MILD RECURRENT MAJOR DEPRESSION (HCC): ICD-10-CM

## 2022-05-13 PROCEDURE — 99214 OFFICE O/P EST MOD 30 MIN: CPT | Performed by: FAMILY MEDICINE

## 2022-05-13 PROCEDURE — 1036F TOBACCO NON-USER: CPT | Performed by: FAMILY MEDICINE

## 2022-05-13 NOTE — PROGRESS NOTES
Assessment/Plan:   1  Essential hypertension    Stable today  Continue with routine home monitoring    2  Hyperlipidemia, unspecified hyperlipidemia type   moderately elevated today  Reviewed her fasting panel with  It appears that this likely secondary to her poor diet  At this time, it appears likely secondary to her multiple stressors  She was advised on importance of maintaining a very strict low-fat /low-cholesterol diet  Will recheck her blood work in 3 months  If her lipid panel is not corrected, she will likely need to start statin treatment  - Comprehensive metabolic panel; Future  - Lipid Panel with Direct LDL reflex; Future    3  Hypothyroidism, unspecified type    Stable  She is currently asymptomatic  Thyroid function testings have been stable  Will continue with her current treatment  4  Mild recurrent major depression ([facility] [address] )    Patient's mood has been unfortunately uncontrolled  She has been having increasing anxiety  This time, this appears likely secondary to her multiple stressors  She was advised on importance of maintaining proper activity  Continue with her Lexapro  She was given alprazolam   She may use this as needed for symptom relief  5  Gastroesophageal reflux disease without esophagitis    Stable  Will continue with current treatment of omeprazole as well as her dietary trigger avoidance  She is scheduled for an upper endoscopy next month  Follow up with patient in 6 months  Diagnoses and all orders for this visit:    Essential hypertension    Hyperlipidemia, unspecified hyperlipidemia type  -     Comprehensive metabolic panel; Future  -     Lipid Panel with Direct LDL reflex;  Future    Hypothyroidism, unspecified type    Mild recurrent major depression ([facility] [address] )    Gastroesophageal reflux disease without esophagitis        Subjective:       Chief Complaint   Patient presents with    Follow-up     6 month, BW review       Patient ID: Floyd Gregory is a 59 y o  female  Presents today for follow-up on her chronic conditions  She has hypertension, dyslipidemia, hypothyroidism, depression as well as GERD  She has been taking medications regularly  She denies adverse reactions with medications  Her stress levels significantly elevated secondary to multiple home stressors  She was given a prescription for alprazolam however she has not taken any yet  Her activity has not been as high as she wishes  She recently had blood work completed  HPI    Review of Systems   Constitutional: Negative for activity change, chills, fatigue and fever  HENT: Negative for congestion, ear pain, sinus pressure and sore throat  Eyes: Negative for redness, itching and visual disturbance  Respiratory: Negative for cough and shortness of breath  Cardiovascular: Negative for chest pain and palpitations  Gastrointestinal: Negative for abdominal pain, diarrhea and nausea  Endocrine: Negative for cold intolerance and heat intolerance  Genitourinary: Negative for dysuria, flank pain and frequency  Musculoskeletal: Negative for arthralgias, back pain, gait problem and myalgias  Skin: Negative for color change  Allergic/Immunologic: Negative for environmental allergies  Neurological: Negative for dizziness, numbness and headaches  Psychiatric/Behavioral: Negative for behavioral problems and sleep disturbance  The following portions of the patient's history were reviewed and updated as appropriate : past family history, past medical history, past social history and past surgical history      Current Outpatient Medications:     Biotin 1 MG CAPS, BID, Disp: , Rfl:     Calcium Carb-Cholecalciferol (CALCIUM 1000 + D PO), 1,000 mg BID, Disp: , Rfl:     diclofenac (VOLTAREN) 75 mg EC tablet, Take 1 tablet (75 mg total) by mouth 2 (two) times a day PRN for pain, Disp: 60 tablet, Rfl: 1    escitalopram (LEXAPRO) 10 mg tablet, TAKE 1 TABLET DAILY, Disp: 90 tablet, Rfl: 3    Fish Oil-Krill Oil (KRILL OIL PLUS) CAPS, Take by mouth, Disp: , Rfl:     hydrOXYzine HCL (ATARAX) 50 mg tablet, Take 1 tablet (50 mg total) by mouth 3 (three) times a day as needed for anxiety, Disp: 10 tablet, Rfl: 0    levothyroxine 88 mcg tablet, TAKE 1 TABLET DAILY, Disp: 90 tablet, Rfl: 3    methylPREDNISolone 4 MG tablet therapy pack, Use as directed on package, Disp: 1 tablet, Rfl: 0    Multiple Vitamins-Minerals (CENTRUM SILVER ADULT 50+ PO), Take by mouth, Disp: , Rfl:     omeprazole (PriLOSEC) 20 mg delayed release capsule, Take 1 capsule (20 mg total) by mouth daily before breakfast, Disp: 30 capsule, Rfl: 1         Objective:         Vitals:    05/13/22 0803   BP: 120/70   BP Location: Left arm   Patient Position: Sitting   Cuff Size: Adult   Pulse: 71   Resp: 18   Temp: 98 8 °F (37 1 °C)   TempSrc: Tympanic   SpO2: 98%   Weight: 78 2 kg (172 lb 6 4 oz)   Height: 5' 4 5" (1 638 m)     Physical Exam  Vitals reviewed  Constitutional:       Appearance: She is well-developed  HENT:      Head: Normocephalic and atraumatic  Nose: Nose normal       Mouth/Throat:      Pharynx: No oropharyngeal exudate  Eyes:      General: No scleral icterus  Right eye: No discharge  Left eye: No discharge  Pupils: Pupils are equal, round, and reactive to light  Neck:      Trachea: No tracheal deviation  Cardiovascular:      Rate and Rhythm: Normal rate and regular rhythm  Pulses:           Dorsalis pedis pulses are 2+ on the right side and 2+ on the left side  Posterior tibial pulses are 2+ on the right side and 2+ on the left side  Heart sounds: Normal heart sounds  No murmur heard  No friction rub  No gallop  Pulmonary:      Effort: Pulmonary effort is normal  No respiratory distress  Breath sounds: Normal breath sounds  No wheezing or rales  Abdominal:      General: Bowel sounds are normal  There is no distension  Palpations: Abdomen is soft  Tenderness: There is no abdominal tenderness  There is no guarding or rebound  Musculoskeletal:         General: Normal range of motion  Cervical back: Normal range of motion and neck supple  Lymphadenopathy:      Head:      Right side of head: No submental or submandibular adenopathy  Left side of head: No submental or submandibular adenopathy  Cervical: No cervical adenopathy  Right cervical: No superficial, deep or posterior cervical adenopathy  Left cervical: No superficial, deep or posterior cervical adenopathy  Skin:     General: Skin is warm and dry  Findings: No erythema  Neurological:      Mental Status: She is alert and oriented to person, place, and time  Cranial Nerves: No cranial nerve deficit  Sensory: No sensory deficit  Psychiatric:         Mood and Affect: Mood is not anxious or depressed  Speech: Speech normal          Behavior: Behavior normal          Thought Content:  Thought content normal          Judgment: Judgment normal

## 2022-05-18 ENCOUNTER — OFFICE VISIT (OUTPATIENT)
Dept: PODIATRY | Facility: CLINIC | Age: 62
End: 2022-05-18
Payer: COMMERCIAL

## 2022-05-18 VITALS
BODY MASS INDEX: 28.89 KG/M2 | DIASTOLIC BLOOD PRESSURE: 72 MMHG | HEIGHT: 65 IN | WEIGHT: 173.4 LBS | SYSTOLIC BLOOD PRESSURE: 140 MMHG

## 2022-05-18 DIAGNOSIS — B35.1 ONYCHOMYCOSIS: Primary | ICD-10-CM

## 2022-05-18 PROCEDURE — 99203 OFFICE O/P NEW LOW 30 MIN: CPT | Performed by: PODIATRIST

## 2022-05-18 PROCEDURE — 3008F BODY MASS INDEX DOCD: CPT | Performed by: FAMILY MEDICINE

## 2022-05-18 RX ORDER — TERBINAFINE HYDROCHLORIDE 250 MG/1
250 TABLET ORAL DAILY
Qty: 84 TABLET | Refills: 0 | Status: SHIPPED | OUTPATIENT
Start: 2022-05-18 | End: 2022-08-10

## 2022-06-08 ENCOUNTER — TELEPHONE (OUTPATIENT)
Dept: OTHER | Facility: OTHER | Age: 62
End: 2022-06-08

## 2022-06-08 RX ORDER — SODIUM CHLORIDE 9 MG/ML
125 INJECTION, SOLUTION INTRAVENOUS CONTINUOUS
Status: CANCELLED | OUTPATIENT
Start: 2022-06-08

## 2022-06-08 NOTE — TELEPHONE ENCOUNTER
Patient has an EGD scheduled for 6/9/22  She states someone called her twice yesterday for her insurance info as they were verifying if her insurance would be covering procedure  She has not heard back an answer yet  Please reach out to patient

## 2022-06-09 ENCOUNTER — HOSPITAL ENCOUNTER (OUTPATIENT)
Dept: GASTROENTEROLOGY | Facility: HOSPITAL | Age: 62
Setting detail: OUTPATIENT SURGERY
Discharge: HOME | End: 2022-06-09
Attending: INTERNAL MEDICINE | Admitting: INTERNAL MEDICINE
Payer: COMMERCIAL

## 2022-06-09 ENCOUNTER — ANESTHESIA (OUTPATIENT)
Dept: GASTROENTEROLOGY | Facility: HOSPITAL | Age: 62
End: 2022-06-09

## 2022-06-09 ENCOUNTER — ANESTHESIA EVENT (OUTPATIENT)
Dept: GASTROENTEROLOGY | Facility: HOSPITAL | Age: 62
End: 2022-06-09

## 2022-06-09 VITALS
HEIGHT: 65 IN | BODY MASS INDEX: 27.99 KG/M2 | RESPIRATION RATE: 16 BRPM | DIASTOLIC BLOOD PRESSURE: 88 MMHG | TEMPERATURE: 97.2 F | SYSTOLIC BLOOD PRESSURE: 144 MMHG | HEART RATE: 77 BPM | WEIGHT: 168 LBS | OXYGEN SATURATION: 97 %

## 2022-06-09 DIAGNOSIS — K21.9 GASTROESOPHAGEAL REFLUX DISEASE WITHOUT ESOPHAGITIS: ICD-10-CM

## 2022-06-09 DIAGNOSIS — R13.19 ESOPHAGEAL DYSPHAGIA: ICD-10-CM

## 2022-06-09 PROCEDURE — 43239 EGD BIOPSY SINGLE/MULTIPLE: CPT | Performed by: INTERNAL MEDICINE

## 2022-06-09 PROCEDURE — 88305 TISSUE EXAM BY PATHOLOGIST: CPT | Performed by: PATHOLOGY

## 2022-06-09 RX ORDER — PROPOFOL 10 MG/ML
INJECTION, EMULSION INTRAVENOUS AS NEEDED
Status: DISCONTINUED | OUTPATIENT
Start: 2022-06-09 | End: 2022-06-09

## 2022-06-09 RX ORDER — SODIUM CHLORIDE 9 MG/ML
125 INJECTION, SOLUTION INTRAVENOUS CONTINUOUS
Status: DISCONTINUED | OUTPATIENT
Start: 2022-06-09 | End: 2022-06-13 | Stop reason: HOSPADM

## 2022-06-09 RX ORDER — LIDOCAINE HYDROCHLORIDE 20 MG/ML
INJECTION, SOLUTION EPIDURAL; INFILTRATION; INTRACAUDAL; PERINEURAL AS NEEDED
Status: DISCONTINUED | OUTPATIENT
Start: 2022-06-09 | End: 2022-06-09

## 2022-06-09 RX ORDER — PROPOFOL 10 MG/ML
INJECTION, EMULSION INTRAVENOUS CONTINUOUS PRN
Status: DISCONTINUED | OUTPATIENT
Start: 2022-06-09 | End: 2022-06-09

## 2022-06-09 RX ORDER — ONDANSETRON 2 MG/ML
INJECTION INTRAMUSCULAR; INTRAVENOUS AS NEEDED
Status: DISCONTINUED | OUTPATIENT
Start: 2022-06-09 | End: 2022-06-09

## 2022-06-09 RX ADMIN — SODIUM CHLORIDE 125 ML/HR: 0.9 INJECTION, SOLUTION INTRAVENOUS at 08:22

## 2022-06-09 RX ADMIN — LIDOCAINE HYDROCHLORIDE 100 MG: 20 INJECTION, SOLUTION EPIDURAL; INFILTRATION; INTRACAUDAL at 09:03

## 2022-06-09 RX ADMIN — PROPOFOL 140 MG: 10 INJECTION, EMULSION INTRAVENOUS at 09:03

## 2022-06-09 RX ADMIN — ONDANSETRON 4 MG: 2 INJECTION INTRAMUSCULAR; INTRAVENOUS at 09:01

## 2022-06-09 RX ADMIN — PROPOFOL 120 MCG/KG/MIN: 10 INJECTION, EMULSION INTRAVENOUS at 09:03

## 2022-06-09 NOTE — ANESTHESIA PREPROCEDURE EVALUATION
Procedure:  EGD    Relevant Problems   CARDIO   (+) Hyperlipidemia      ENDO   (+) Hypothyroidism      GI/HEPATIC   (+) Gastroesophageal reflux disease      MUSCULOSKELETAL   (+) Sacroiliitis (HCC)      NEURO/PSYCH   (+) Depressive disorder      PULMONARY   (+)         Physical Exam    Airway    Mallampati score: I  TM Distance: <3 FB  Neck ROM: full     Dental       Cardiovascular  Rhythm: regular, Rate: normal,     Pulmonary  Pulmonary exam normal     Other Findings        Anesthesia Plan  ASA Score- 2     Anesthesia Type- general with ASA Monitors  Additional Monitors:   Airway Plan:           Plan Factors-Exercise tolerance (METS): >4 METS  Chart reviewed  Existing labs reviewed  Patient summary reviewed  Patient is not a current smoker  Patient did not smoke on day of surgery  Obstructive sleep apnea risk education given perioperatively  Induction- intravenous  Postoperative Plan-     Informed Consent- Anesthetic plan and risks discussed with patient

## 2022-06-09 NOTE — ANESTHESIA POSTPROCEDURE EVALUATION
Post-Op Assessment Note    CV Status:  Stable    Pain management: adequate     Mental Status:  Alert and awake   Hydration Status:  Euvolemic   PONV Controlled:  Controlled   Airway Patency:  Patent      Post Op Vitals Reviewed: Yes      Staff: Anesthesiologist         No complications documented      BP      Temp     Pulse     Resp      SpO2      /66   Pulse 73   Temp (!) 97 2 °F (36 2 °C) (Temporal)   Resp 18   Ht 5' 4 5" (1 638 m)   Wt 76 2 kg (168 lb)   LMP  (LMP Unknown)   SpO2 97%   BMI 28 39 kg/m²

## 2022-06-09 NOTE — H&P
History and Physical - [facility]  Saida Sanchez 59 y o  female MRN: [id number]                  HPI: Saida Sanchez is a 59y o  year old female who presents for GERD, dysphagia, early satiety  REVIEW OF SYSTEMS: Per the HPI, and otherwise unremarkable  Historical Information   Past Medical History:   Diagnosis Date    Chronic cough     Depression     GERD (gastroesophageal reflux disease)     Hiatal hernia     resolved 12/7/16    Hyperlipidemia     Insomnia     Post herpetic neuralgia     last assessed 7/13/16, resolved 12/7/16    Sacroiliac pain      Past Surgical History:   Procedure Laterality Date    COLONOSCOPY      EAR SURGERY      GASTRIC BYPASS      HYSTERECTOMY      OOPHORECTOMY Bilateral     ID COLONOSCOPY FLX DX W/COLLJ SPEC WHEN PFRMD N/A 2/5/2019    Procedure: COLONOSCOPY;  Surgeon: Zulma Alexander MD;  Location: [facility];   Service: General     Social History   Social History     Substance and Sexual Activity   Alcohol Use Yes    Comment: SOCIAL     Social History     Substance and Sexual Activity   Drug Use No     Social History     Tobacco Use   Smoking Status Former Smoker   Smokeless Tobacco Never Used     Family History   Problem Relation Age of Onset    Diabetes Mother     Lymphoma Mother 76        non-hodgkins    Breast cancer Sister 48    Stroke Father     No Known Problems Daughter     Breast cancer Maternal Grandmother         age at onset unknown    No Known Problems Maternal Grandfather     Breast cancer Paternal Grandmother         age at onset unknown    No Known Problems Paternal Grandfather     No Known Problems Daughter     Breast cancer Maternal Aunt         age unknown    No Known Problems Maternal Aunt     No Known Problems Maternal Aunt     No Known Problems Paternal Aunt     No Known Problems Paternal Aunt     No Known Problems Paternal Aunt        Meds/Allergies     (Not in a hospital admission)      Allergies Allergen Reactions    Amoxicillin-Pot Clavulanate Facial Swelling and Swelling     Lip swelling       Objective     Blood pressure 137/66, pulse 73, temperature (!) 97 2 °F (36 2 °C), temperature source Temporal, resp  rate 18, height 5' 4 5" (1 638 m), weight 76 2 kg (168 lb), SpO2 97 %, not currently breastfeeding  PHYSICAL EXAMINATION:    General Appearance:   Alert, cooperative, no distress   HEENT:  Normocephalic, atraumatic, anicteric  Neck supple, symmetrical, trachea midline  Lungs:   Equal chest rise and unlabored breathing, normal effort, no coughing  Cardiovascular:   No visualized JVD  Abdomen:   No abdominal distension  Skin:   No jaundice, rashes, or lesions  Musculoskeletal:   Normal range of motion visualized  Psych:  Normal affect and normal insight  Neuro:  Alert and appropriate  ASSESSMENT/PLAN:  This is a 59y o  year old female here for EGD, and she is stable and optimized for her procedure

## 2022-09-24 ENCOUNTER — APPOINTMENT (OUTPATIENT)
Dept: LAB | Facility: HOSPITAL | Age: 62
End: 2022-09-24
Payer: COMMERCIAL

## 2022-09-24 DIAGNOSIS — E78.5 HYPERLIPIDEMIA, UNSPECIFIED HYPERLIPIDEMIA TYPE: ICD-10-CM

## 2022-09-24 LAB
ALBUMIN SERPL BCP-MCNC: 4.1 G/DL (ref 3.5–5)
ALP SERPL-CCNC: 159 U/L (ref 43–122)
ALT SERPL W P-5'-P-CCNC: 42 U/L
ANION GAP SERPL CALCULATED.3IONS-SCNC: 8 MMOL/L (ref 5–14)
AST SERPL W P-5'-P-CCNC: 51 U/L (ref 14–36)
BILIRUB SERPL-MCNC: 0.5 MG/DL (ref 0.2–1)
BUN SERPL-MCNC: 5 MG/DL (ref 5–25)
CALCIUM SERPL-MCNC: 9 MG/DL (ref 8.4–10.2)
CHLORIDE SERPL-SCNC: 104 MMOL/L (ref 96–108)
CHOLEST SERPL-MCNC: 236 MG/DL
CO2 SERPL-SCNC: 26 MMOL/L (ref 21–32)
CREAT SERPL-MCNC: 0.67 MG/DL (ref 0.6–1.2)
GFR SERPL CREATININE-BSD FRML MDRD: 93 ML/MIN/1.73SQ M
GLUCOSE P FAST SERPL-MCNC: 111 MG/DL (ref 70–99)
HDLC SERPL-MCNC: 70 MG/DL
LDLC SERPL CALC-MCNC: 138 MG/DL
POTASSIUM SERPL-SCNC: 3.9 MMOL/L (ref 3.5–5.3)
PROT SERPL-MCNC: 7.5 G/DL (ref 6.4–8.4)
SODIUM SERPL-SCNC: 138 MMOL/L (ref 135–147)
TRIGL SERPL-MCNC: 138 MG/DL

## 2022-09-24 PROCEDURE — 36415 COLL VENOUS BLD VENIPUNCTURE: CPT

## 2022-09-24 PROCEDURE — 80061 LIPID PANEL: CPT

## 2022-09-24 PROCEDURE — 80053 COMPREHEN METABOLIC PANEL: CPT

## 2022-10-12 PROBLEM — J06.9 VIRAL URI WITH COUGH: Status: RESOLVED | Noted: 2020-12-31 | Resolved: 2022-10-12

## 2022-10-31 ENCOUNTER — VBI (OUTPATIENT)
Dept: ADMINISTRATIVE | Facility: OTHER | Age: 62
End: 2022-10-31

## 2022-11-08 ENCOUNTER — TELEPHONE (OUTPATIENT)
Dept: FAMILY MEDICINE CLINIC | Facility: CLINIC | Age: 62
End: 2022-11-08

## 2022-11-08 DIAGNOSIS — Z13.0 SCREENING FOR IRON DEFICIENCY ANEMIA: ICD-10-CM

## 2022-11-08 DIAGNOSIS — Z13.1 SCREENING FOR DIABETES MELLITUS: ICD-10-CM

## 2022-11-08 DIAGNOSIS — E78.5 HYPERLIPIDEMIA, UNSPECIFIED HYPERLIPIDEMIA TYPE: ICD-10-CM

## 2022-11-08 DIAGNOSIS — E03.9 HYPOTHYROIDISM, UNSPECIFIED TYPE: Primary | ICD-10-CM

## 2022-11-08 DIAGNOSIS — F32.A DEPRESSIVE DISORDER: ICD-10-CM

## 2022-11-08 DIAGNOSIS — I10 ESSENTIAL HYPERTENSION: ICD-10-CM

## 2022-11-08 NOTE — TELEPHONE ENCOUNTER
Pt MYRTLE stating she has an appt on Monday and would like to recheck her BW before hand  To check her liver numbers again

## 2022-11-12 ENCOUNTER — APPOINTMENT (OUTPATIENT)
Dept: LAB | Facility: CLINIC | Age: 62
End: 2022-11-12

## 2022-11-12 DIAGNOSIS — E03.9 HYPOTHYROIDISM, UNSPECIFIED TYPE: ICD-10-CM

## 2022-11-12 DIAGNOSIS — E78.5 HYPERLIPIDEMIA, UNSPECIFIED HYPERLIPIDEMIA TYPE: ICD-10-CM

## 2022-11-12 DIAGNOSIS — M79.672 LEFT FOOT PAIN: ICD-10-CM

## 2022-11-12 DIAGNOSIS — Z13.0 SCREENING FOR IRON DEFICIENCY ANEMIA: ICD-10-CM

## 2022-11-12 DIAGNOSIS — I10 ESSENTIAL HYPERTENSION: ICD-10-CM

## 2022-11-12 DIAGNOSIS — Z13.1 SCREENING FOR DIABETES MELLITUS: ICD-10-CM

## 2022-11-12 LAB
ALBUMIN SERPL BCP-MCNC: 3 G/DL (ref 3.5–5)
ALP SERPL-CCNC: 108 U/L (ref 46–116)
ALT SERPL W P-5'-P-CCNC: 34 U/L (ref 12–78)
ANION GAP SERPL CALCULATED.3IONS-SCNC: 6 MMOL/L (ref 4–13)
AST SERPL W P-5'-P-CCNC: 33 U/L (ref 5–45)
BILIRUB SERPL-MCNC: 0.46 MG/DL (ref 0.2–1)
BUN SERPL-MCNC: 7 MG/DL (ref 5–25)
CALCIUM ALBUM COR SERPL-MCNC: 9.8 MG/DL (ref 8.3–10.1)
CALCIUM SERPL-MCNC: 9 MG/DL (ref 8.3–10.1)
CHLORIDE SERPL-SCNC: 106 MMOL/L (ref 96–108)
CHOLEST SERPL-MCNC: 204 MG/DL
CO2 SERPL-SCNC: 25 MMOL/L (ref 21–32)
CREAT SERPL-MCNC: 0.77 MG/DL (ref 0.6–1.3)
ERYTHROCYTE [DISTWIDTH] IN BLOOD BY AUTOMATED COUNT: 13.1 % (ref 11.6–15.1)
EST. AVERAGE GLUCOSE BLD GHB EST-MCNC: 100 MG/DL
GFR SERPL CREATININE-BSD FRML MDRD: 81 ML/MIN/1.73SQ M
GLUCOSE P FAST SERPL-MCNC: 95 MG/DL (ref 65–99)
HBA1C MFR BLD: 5.1 %
HCT VFR BLD AUTO: 44.4 % (ref 34.8–46.1)
HDLC SERPL-MCNC: 63 MG/DL
HGB BLD-MCNC: 14.6 G/DL (ref 11.5–15.4)
LDLC SERPL CALC-MCNC: 100 MG/DL (ref 0–100)
MCH RBC QN AUTO: 31.2 PG (ref 26.8–34.3)
MCHC RBC AUTO-ENTMCNC: 32.9 G/DL (ref 31.4–37.4)
MCV RBC AUTO: 95 FL (ref 82–98)
PLATELET # BLD AUTO: 362 THOUSANDS/UL (ref 149–390)
PMV BLD AUTO: 9.5 FL (ref 8.9–12.7)
POTASSIUM SERPL-SCNC: 4.2 MMOL/L (ref 3.5–5.3)
PROT SERPL-MCNC: 7.4 G/DL (ref 6.4–8.4)
RBC # BLD AUTO: 4.68 MILLION/UL (ref 3.81–5.12)
SODIUM SERPL-SCNC: 137 MMOL/L (ref 135–147)
TRIGL SERPL-MCNC: 204 MG/DL
TSH SERPL DL<=0.05 MIU/L-ACNC: 3.53 UIU/ML (ref 0.45–4.5)
WBC # BLD AUTO: 5.7 THOUSAND/UL (ref 4.31–10.16)

## 2022-11-14 ENCOUNTER — OFFICE VISIT (OUTPATIENT)
Dept: FAMILY MEDICINE CLINIC | Facility: CLINIC | Age: 62
End: 2022-11-14

## 2022-11-14 VITALS
RESPIRATION RATE: 17 BRPM | OXYGEN SATURATION: 98 % | TEMPERATURE: 100 F | HEART RATE: 94 BPM | SYSTOLIC BLOOD PRESSURE: 138 MMHG | HEIGHT: 65 IN | BODY MASS INDEX: 28.09 KG/M2 | WEIGHT: 168.6 LBS | DIASTOLIC BLOOD PRESSURE: 88 MMHG

## 2022-11-14 DIAGNOSIS — I10 ESSENTIAL HYPERTENSION: ICD-10-CM

## 2022-11-14 DIAGNOSIS — E03.9 HYPOTHYROIDISM, UNSPECIFIED TYPE: ICD-10-CM

## 2022-11-14 DIAGNOSIS — M79.672 LEFT FOOT PAIN: Primary | ICD-10-CM

## 2022-11-14 DIAGNOSIS — F32.A DEPRESSIVE DISORDER: ICD-10-CM

## 2022-11-14 DIAGNOSIS — E78.5 HYPERLIPIDEMIA, UNSPECIFIED HYPERLIPIDEMIA TYPE: ICD-10-CM

## 2022-11-14 LAB — URATE SERPL-MCNC: 7.1 MG/DL (ref 2–7.5)

## 2022-11-14 NOTE — PROGRESS NOTES
Assessment/Plan:   1  Left foot pain    Patient's symptoms today  At this time, symptoms appear slowly improving  Is unclear as to exact etiology of her foot pain  Reviewed the differential possible causes  At this time, will check uric acid level  Will also send patient for an x-ray of her foot  Will consider further treatment for gout if her uric acid level   Is elevated  - Uric acid; Future  - XR foot 3+ vw left; Future    2  Essential hypertension    Blood pressure appears stable  At this time, continue with routine home monitoring  If any persistent elevations noted, will consider further treatment  3  Hyperlipidemia, unspecified hyperlipidemia type    Stable  Will continue with a strict low-fat/low-cholesterol diet  4  Depressive disorder    Mood appears stable  Will continue with current treatment of Lexapro  Continue with a strict diet and exercise    5  Hypothyroidism, unspecified type   thyroid function testing has been stable  Continue with current dose of levothyroxine  Follow up patient in 6 months  There are no diagnoses linked to this encounter  Subjective:       Chief Complaint   Patient presents with   • Follow-up     6 month, BW review    • Foot Problem     L foot burning and swollen x3 weeks      Patient ID: Julien Perston is a 57 y o  female  Presents today for a follow-up on her chronic conditions  She has hypertension, dyslipidemia, depression as well as hypothyroidism  She has been taking her medications regularly  She denies adverse reactions with her medications  Leg Pain   Incident onset: 3-4 wks ago  The incident occurred at home  There was no injury mechanism  The pain is present in the left toes  The quality of the pain is described as burning  The pain is at a severity of 4/10  The pain is moderate  The pain has been intermittent since onset  Pertinent negatives include no numbness or tingling  The symptoms are aggravated by weight bearing   She has tried NSAIDs for the symptoms  The treatment provided mild relief  Review of Systems   Neurological: Negative for tingling and numbness  The following portions of the patient's history were reviewed and updated as appropriate : past family history, past medical history, past social history and past surgical history      Current Outpatient Medications:   •  Biotin 1 MG CAPS, BID, Disp: , Rfl:   •  Calcium Carb-Cholecalciferol (CALCIUM 1000 + D PO), 1,000 mg BID, Disp: , Rfl:   •  diclofenac (VOLTAREN) 75 mg EC tablet, Take 1 tablet (75 mg total) by mouth 2 (two) times a day PRN for pain, Disp: 60 tablet, Rfl: 1  •  escitalopram (LEXAPRO) 10 mg tablet, TAKE 1 TABLET DAILY, Disp: 90 tablet, Rfl: 3  •  Fish Oil-Krill Oil (KRILL OIL PLUS) CAPS, Take by mouth, Disp: , Rfl:   •  hydrOXYzine HCL (ATARAX) 50 mg tablet, Take 1 tablet (50 mg total) by mouth 3 (three) times a day as needed for anxiety, Disp: 10 tablet, Rfl: 0  •  levothyroxine 88 mcg tablet, TAKE 1 TABLET DAILY, Disp: 90 tablet, Rfl: 3  •  Multiple Vitamins-Minerals (CENTRUM SILVER ADULT 50+ PO), Take by mouth, Disp: , Rfl:   •  omeprazole (PriLOSEC) 20 mg delayed release capsule, Take 1 capsule (20 mg total) by mouth daily before breakfast, Disp: 30 capsule, Rfl: 1         Objective:         Vitals:    11/14/22 0802   BP: 138/88   Pulse: 94   Resp: 17   Temp: 100 °F (37 8 °C)   TempSrc: Tympanic   SpO2: 98%   Weight: 76 5 kg (168 lb 9 6 oz)   Height: 5' 4 5" (1 638 m)     Physical Exam

## 2022-11-15 ENCOUNTER — APPOINTMENT (OUTPATIENT)
Dept: RADIOLOGY | Facility: CLINIC | Age: 62
End: 2022-11-15

## 2022-11-15 DIAGNOSIS — M79.672 LEFT FOOT PAIN: ICD-10-CM

## 2022-11-17 ENCOUNTER — TELEPHONE (OUTPATIENT)
Dept: FAMILY MEDICINE CLINIC | Facility: CLINIC | Age: 62
End: 2022-11-17

## 2022-11-17 DIAGNOSIS — M79.672 LEFT FOOT PAIN: Primary | ICD-10-CM

## 2022-11-17 RX ORDER — PREDNISONE 10 MG/1
TABLET ORAL
Qty: 21 TABLET | Refills: 0 | Status: SHIPPED | OUTPATIENT
Start: 2022-11-17 | End: 2023-06-07

## 2022-11-17 NOTE — TELEPHONE ENCOUNTER
Pt LVM stating that she was treated for Gout  She received her results about her uric acid  She states that she would like to get started on the treatment that was recommended (Predisone)  She would like it to be sent to [facility] in [place] and would like a call when the med has been sent

## 2022-11-18 DIAGNOSIS — M79.672 LEFT FOOT PAIN: Primary | ICD-10-CM

## 2022-11-18 NOTE — TELEPHONE ENCOUNTER
Pt notified  Scheduled for 12/19 to check uric acid levels  Pt wanted to know if labs were needed to check uric acid, if so can they be entered prior to her visit on 12/19? Thank you

## 2022-12-17 ENCOUNTER — APPOINTMENT (OUTPATIENT)
Dept: LAB | Facility: CLINIC | Age: 62
End: 2022-12-17

## 2022-12-17 DIAGNOSIS — M79.672 LEFT FOOT PAIN: ICD-10-CM

## 2022-12-17 LAB — URATE SERPL-MCNC: 7.1 MG/DL (ref 2–7.5)

## 2022-12-19 ENCOUNTER — OFFICE VISIT (OUTPATIENT)
Dept: FAMILY MEDICINE CLINIC | Facility: CLINIC | Age: 62
End: 2022-12-19

## 2022-12-19 ENCOUNTER — TELEPHONE (OUTPATIENT)
Dept: FAMILY MEDICINE CLINIC | Facility: CLINIC | Age: 62
End: 2022-12-19

## 2022-12-19 VITALS
HEART RATE: 98 BPM | SYSTOLIC BLOOD PRESSURE: 118 MMHG | RESPIRATION RATE: 17 BRPM | TEMPERATURE: 99.5 F | DIASTOLIC BLOOD PRESSURE: 70 MMHG | WEIGHT: 165.6 LBS | OXYGEN SATURATION: 97 % | BODY MASS INDEX: 27.59 KG/M2 | HEIGHT: 65 IN

## 2022-12-19 DIAGNOSIS — J06.9 ACUTE URI: ICD-10-CM

## 2022-12-19 DIAGNOSIS — M79.672 LEFT FOOT PAIN: Primary | ICD-10-CM

## 2022-12-19 RX ORDER — PROMETHAZINE HYDROCHLORIDE AND CODEINE PHOSPHATE 6.25; 1 MG/5ML; MG/5ML
5 SYRUP ORAL
Qty: 50 ML | Refills: 0 | Status: SHIPPED | OUTPATIENT
Start: 2022-12-19

## 2022-12-19 RX ORDER — BENZONATATE 200 MG/1
200 CAPSULE ORAL 3 TIMES DAILY PRN
Qty: 30 CAPSULE | Refills: 0 | Status: SHIPPED | OUTPATIENT
Start: 2022-12-19

## 2022-12-19 NOTE — PROGRESS NOTES
Assessment/Plan:   1  Left foot pain  Unclear the exact cause of patient's left foot pain  Her symptoms have not improved with the treatment of prednisone  Reviewed her x-ray again with her  At this time, given the persistence of her foot pain, will refer patient to podiatry for further evaluation  2  Acute URI  Symptoms appear secondary to acute URI  At this time, continue supportive care  Maintain hydration  Take over-the-counter Mucinex  We will treat start treatment with Tessalon Perles  Follow-up if any symptoms are persisting  Will consider antibiotics by the end of the week if symptoms or not improving   - promethazine-codeine (PHENERGAN WITH CODEINE) 6 25-10 mg/5 mL syrup; Take 5 mL by mouth daily at bedtime as needed for cough  Dispense: 50 mL; Refill: 0  - benzonatate (TESSALON) 200 MG capsule; Take 1 capsule (200 mg total) by mouth 3 (three) times a day as needed for cough  Dispense: 30 capsule; Refill: 0           There are no diagnoses linked to this encounter  Subjective:       Chief Complaint   Patient presents with   • Cold Like Symptoms     Cough and congestion x1 day   • Follow-up     Uric acid level      Patient ID: Aman Hidalgo is a 57 y o  female presents today for follow-up on left foot pain  Since her last visit, she has not had any improvement of her foot pain  She did complete her prednisone however she does not believe this has helped her at all  She did have a repeat uric acid level  She went home yesterday  URI   This is a new problem  Episode onset: 2 days ago  The problem has been unchanged  There has been no fever  Associated symptoms include coughing, rhinorrhea, sinus pain and sneezing  Pertinent negatives include no abdominal pain, chest pain, congestion, diarrhea, dysuria, ear pain, headaches, nausea or sore throat  Treatments tried: Mucinex  The treatment provided no relief         Review of Systems   Constitutional: Negative for activity change, chills, fatigue and fever  HENT: Positive for rhinorrhea, sinus pain and sneezing  Negative for congestion, ear pain, sinus pressure and sore throat  Eyes: Negative for redness, itching and visual disturbance  Respiratory: Positive for cough  Negative for shortness of breath  Cardiovascular: Negative for chest pain and palpitations  Gastrointestinal: Negative for abdominal pain, diarrhea and nausea  Endocrine: Negative for cold intolerance and heat intolerance  Genitourinary: Negative for dysuria, flank pain and frequency  Musculoskeletal: Negative for arthralgias, back pain, gait problem and myalgias  Skin: Negative for color change  Allergic/Immunologic: Negative for environmental allergies  Neurological: Negative for dizziness, numbness and headaches  Psychiatric/Behavioral: Negative for behavioral problems and sleep disturbance  The following portions of the patient's history were reviewed and updated as appropriate : past family history, past medical history, past social history and past surgical history      Current Outpatient Medications:   •  Biotin 1 MG CAPS, BID, Disp: , Rfl:   •  Calcium Carb-Cholecalciferol (CALCIUM 1000 + D PO), 1,000 mg BID, Disp: , Rfl:   •  diclofenac (VOLTAREN) 75 mg EC tablet, Take 1 tablet (75 mg total) by mouth 2 (two) times a day PRN for pain, Disp: 60 tablet, Rfl: 1  •  escitalopram (LEXAPRO) 10 mg tablet, TAKE 1 TABLET DAILY, Disp: 90 tablet, Rfl: 3  •  Fish Oil-Krill Oil (KRILL OIL PLUS) CAPS, Take by mouth, Disp: , Rfl:   •  hydrOXYzine HCL (ATARAX) 50 mg tablet, Take 1 tablet (50 mg total) by mouth 3 (three) times a day as needed for anxiety, Disp: 10 tablet, Rfl: 0  •  levothyroxine 88 mcg tablet, TAKE 1 TABLET DAILY, Disp: 90 tablet, Rfl: 3  •  Multiple Vitamins-Minerals (CENTRUM SILVER ADULT 50+ PO), Take by mouth, Disp: , Rfl:   •  omeprazole (PriLOSEC) 20 mg delayed release capsule, Take 1 capsule (20 mg total) by mouth daily before breakfast, Disp: 30 capsule, Rfl: 1  •  predniSONE 10 mg tablet, Take 6 tablets By mouth  In the morning Qd  Decrease by  By 1 tablet daily until completed  , Disp: 21 tablet, Rfl: 0         Objective:         Vitals:    12/19/22 0826   BP: 118/70   Pulse: 98   Resp: 17   Temp: 99 5 °F (37 5 °C)   TempSrc: Tympanic   SpO2: 97%   Weight: 75 1 kg (165 lb 9 6 oz)   Height: 5' 4 5" (1 638 m)     Physical Exam  Vitals reviewed  Constitutional:       Appearance: She is well-developed  HENT:      Head: Normocephalic and atraumatic  Nose: Nose normal       Mouth/Throat:      Pharynx: No oropharyngeal exudate  Eyes:      General: No scleral icterus  Right eye: No discharge  Left eye: No discharge  Pupils: Pupils are equal, round, and reactive to light  Neck:      Trachea: No tracheal deviation  Cardiovascular:      Rate and Rhythm: Normal rate and regular rhythm  Pulses:           Dorsalis pedis pulses are 2+ on the right side and 2+ on the left side  Posterior tibial pulses are 2+ on the right side and 2+ on the left side  Heart sounds: Normal heart sounds  No murmur heard  No friction rub  No gallop  Pulmonary:      Effort: Pulmonary effort is normal  No respiratory distress  Breath sounds: Normal breath sounds  No wheezing or rales  Abdominal:      General: Bowel sounds are normal  There is no distension  Palpations: Abdomen is soft  Tenderness: There is no abdominal tenderness  There is no guarding or rebound  Musculoskeletal:         General: Normal range of motion  Cervical back: Normal range of motion and neck supple  Lymphadenopathy:      Head:      Right side of head: No submental or submandibular adenopathy  Left side of head: No submental or submandibular adenopathy  Cervical: No cervical adenopathy  Right cervical: No superficial, deep or posterior cervical adenopathy       Left cervical: No superficial, deep or posterior cervical adenopathy  Skin:     General: Skin is warm and dry  Findings: No erythema  Neurological:      Mental Status: She is alert and oriented to person, place, and time  Cranial Nerves: No cranial nerve deficit  Sensory: No sensory deficit  Psychiatric:         Mood and Affect: Mood is not anxious or depressed  Speech: Speech normal          Behavior: Behavior normal          Thought Content:  Thought content normal          Judgment: Judgment normal

## 2023-02-02 DIAGNOSIS — E03.9 HYPOTHYROIDISM, UNSPECIFIED TYPE: ICD-10-CM

## 2023-02-02 RX ORDER — LEVOTHYROXINE SODIUM 88 UG/1
TABLET ORAL
Qty: 90 TABLET | Refills: 3 | Status: SHIPPED | OUTPATIENT
Start: 2023-02-02

## 2023-02-03 ENCOUNTER — HOSPITAL ENCOUNTER (OUTPATIENT)
Dept: MAMMOGRAPHY | Facility: MEDICAL CENTER | Age: 63
Discharge: HOME | End: 2023-02-03

## 2023-02-03 VITALS — WEIGHT: 165.57 LBS | BODY MASS INDEX: 27.58 KG/M2 | HEIGHT: 65 IN

## 2023-02-03 DIAGNOSIS — Z12.31 ENCOUNTER FOR SCREENING MAMMOGRAM FOR MALIGNANT NEOPLASM OF BREAST: ICD-10-CM

## 2023-02-20 DIAGNOSIS — F32.A DEPRESSIVE DISORDER: ICD-10-CM

## 2023-02-20 RX ORDER — ESCITALOPRAM OXALATE 10 MG/1
TABLET ORAL
Qty: 90 TABLET | Refills: 3 | Status: SHIPPED | OUTPATIENT
Start: 2023-02-20

## 2023-06-05 ENCOUNTER — TELEPHONE (OUTPATIENT)
Dept: FAMILY MEDICINE CLINIC | Facility: CLINIC | Age: 63
End: 2023-06-05

## 2023-06-05 DIAGNOSIS — Z13.1 SCREENING FOR DIABETES MELLITUS: ICD-10-CM

## 2023-06-05 DIAGNOSIS — F32.A DEPRESSIVE DISORDER: Primary | ICD-10-CM

## 2023-06-05 DIAGNOSIS — M79.672 LEFT FOOT PAIN: ICD-10-CM

## 2023-06-05 DIAGNOSIS — E03.9 HYPOTHYROIDISM, UNSPECIFIED TYPE: ICD-10-CM

## 2023-06-05 DIAGNOSIS — E78.5 HYPERLIPIDEMIA, UNSPECIFIED HYPERLIPIDEMIA TYPE: ICD-10-CM

## 2023-06-05 DIAGNOSIS — Z13.0 SCREENING FOR IRON DEFICIENCY ANEMIA: ICD-10-CM

## 2023-06-05 DIAGNOSIS — I10 ESSENTIAL HYPERTENSION: ICD-10-CM

## 2023-06-05 NOTE — TELEPHONE ENCOUNTER
"\"Hello, this is [facility] as in [place] as in [place] Date of birth 1960 five eight  I went for my blood work on Saturday morning  I took off half a day of work and went for my fasting blood work and the order was not there  I have an appointment with Doctor Marcia Coker on Wednesday the 14th  That's I'm sorry, Wednesday the 7th at 10:00 AM and I need to have blood work done prior to my appointment  This happened the last time there was no order in I'm finding it very puzzling that when we have a follow up appointment why there is no blood work order in my chart  If you could please have Doctor Marcia Coker ordered this ASAP so I can have this done first thing tomorrow morning  I will again have to take a half a day off of work to have this blood work done  If you need to reach me, my number is [phone]  I would like a courtesy call knowing that yes, the order is in so I can have this done first thing tomorrow morning  Thank you  Neville Lozada \"    Pt requesting BW orders that she can complete prior to her appt  "

## 2023-06-06 ENCOUNTER — APPOINTMENT (OUTPATIENT)
Dept: LAB | Facility: CLINIC | Age: 63
End: 2023-06-06
Payer: COMMERCIAL

## 2023-06-06 DIAGNOSIS — Z13.1 SCREENING FOR DIABETES MELLITUS: ICD-10-CM

## 2023-06-06 DIAGNOSIS — Z13.0 SCREENING FOR IRON DEFICIENCY ANEMIA: ICD-10-CM

## 2023-06-06 DIAGNOSIS — E03.9 HYPOTHYROIDISM, UNSPECIFIED TYPE: ICD-10-CM

## 2023-06-06 DIAGNOSIS — E78.5 HYPERLIPIDEMIA, UNSPECIFIED HYPERLIPIDEMIA TYPE: ICD-10-CM

## 2023-06-06 DIAGNOSIS — M79.672 LEFT FOOT PAIN: ICD-10-CM

## 2023-06-06 LAB
ALBUMIN SERPL BCP-MCNC: 3.1 G/DL (ref 3.5–5)
ALP SERPL-CCNC: 105 U/L (ref 46–116)
ALT SERPL W P-5'-P-CCNC: 39 U/L (ref 12–78)
ANION GAP SERPL CALCULATED.3IONS-SCNC: 1 MMOL/L (ref 4–13)
AST SERPL W P-5'-P-CCNC: 36 U/L (ref 5–45)
BILIRUB SERPL-MCNC: 0.38 MG/DL (ref 0.2–1)
BUN SERPL-MCNC: 9 MG/DL (ref 5–25)
CALCIUM ALBUM COR SERPL-MCNC: 9.6 MG/DL (ref 8.3–10.1)
CALCIUM SERPL-MCNC: 8.9 MG/DL (ref 8.3–10.1)
CHLORIDE SERPL-SCNC: 109 MMOL/L (ref 96–108)
CHOLEST SERPL-MCNC: 219 MG/DL
CO2 SERPL-SCNC: 27 MMOL/L (ref 21–32)
CREAT SERPL-MCNC: 0.74 MG/DL (ref 0.6–1.3)
ERYTHROCYTE [DISTWIDTH] IN BLOOD BY AUTOMATED COUNT: 13.2 % (ref 11.6–15.1)
EST. AVERAGE GLUCOSE BLD GHB EST-MCNC: 91 MG/DL
GFR SERPL CREATININE-BSD FRML MDRD: 85 ML/MIN/1.73SQ M
GLUCOSE P FAST SERPL-MCNC: 80 MG/DL (ref 65–99)
HBA1C MFR BLD: 4.8 %
HCT VFR BLD AUTO: 43.5 % (ref 34.8–46.1)
HDLC SERPL-MCNC: 72 MG/DL
HGB BLD-MCNC: 14.3 G/DL (ref 11.5–15.4)
LDLC SERPL CALC-MCNC: 101 MG/DL (ref 0–100)
MCH RBC QN AUTO: 31 PG (ref 26.8–34.3)
MCHC RBC AUTO-ENTMCNC: 32.9 G/DL (ref 31.4–37.4)
MCV RBC AUTO: 94 FL (ref 82–98)
PLATELET # BLD AUTO: 319 THOUSANDS/UL (ref 149–390)
PMV BLD AUTO: 9.5 FL (ref 8.9–12.7)
POTASSIUM SERPL-SCNC: 4.5 MMOL/L (ref 3.5–5.3)
PROT SERPL-MCNC: 7.3 G/DL (ref 6.4–8.4)
RBC # BLD AUTO: 4.62 MILLION/UL (ref 3.81–5.12)
SODIUM SERPL-SCNC: 137 MMOL/L (ref 135–147)
TRIGL SERPL-MCNC: 228 MG/DL
TSH SERPL DL<=0.05 MIU/L-ACNC: 2.85 UIU/ML (ref 0.45–4.5)
URATE SERPL-MCNC: 6.8 MG/DL (ref 2–7.5)
WBC # BLD AUTO: 3.49 THOUSAND/UL (ref 4.31–10.16)

## 2023-06-06 PROCEDURE — 80053 COMPREHEN METABOLIC PANEL: CPT

## 2023-06-06 PROCEDURE — 84550 ASSAY OF BLOOD/URIC ACID: CPT

## 2023-06-06 PROCEDURE — 84443 ASSAY THYROID STIM HORMONE: CPT

## 2023-06-06 PROCEDURE — 36415 COLL VENOUS BLD VENIPUNCTURE: CPT

## 2023-06-06 PROCEDURE — 83036 HEMOGLOBIN GLYCOSYLATED A1C: CPT

## 2023-06-06 PROCEDURE — 80061 LIPID PANEL: CPT

## 2023-06-06 PROCEDURE — 85027 COMPLETE CBC AUTOMATED: CPT

## 2023-06-07 ENCOUNTER — OFFICE VISIT (OUTPATIENT)
Dept: FAMILY MEDICINE CLINIC | Facility: CLINIC | Age: 63
End: 2023-06-07
Payer: COMMERCIAL

## 2023-06-07 VITALS
BODY MASS INDEX: 28.06 KG/M2 | SYSTOLIC BLOOD PRESSURE: 116 MMHG | WEIGHT: 168.4 LBS | HEIGHT: 65 IN | OXYGEN SATURATION: 97 % | HEART RATE: 84 BPM | TEMPERATURE: 98.7 F | DIASTOLIC BLOOD PRESSURE: 78 MMHG

## 2023-06-07 DIAGNOSIS — F33.0 MILD RECURRENT MAJOR DEPRESSION (HCC): Primary | ICD-10-CM

## 2023-06-07 DIAGNOSIS — E03.9 HYPOTHYROIDISM, UNSPECIFIED TYPE: ICD-10-CM

## 2023-06-07 DIAGNOSIS — E79.0 ELEVATED URIC ACID IN BLOOD: ICD-10-CM

## 2023-06-07 DIAGNOSIS — K21.9 GASTROESOPHAGEAL REFLUX DISEASE WITHOUT ESOPHAGITIS: ICD-10-CM

## 2023-06-07 DIAGNOSIS — Z00.00 ANNUAL PHYSICAL EXAM: ICD-10-CM

## 2023-06-07 DIAGNOSIS — E78.5 HYPERLIPIDEMIA, UNSPECIFIED HYPERLIPIDEMIA TYPE: ICD-10-CM

## 2023-06-07 DIAGNOSIS — M46.1 SACROILIITIS (HCC): ICD-10-CM

## 2023-06-07 DIAGNOSIS — Z78.0 POSTMENOPAUSAL STATE: ICD-10-CM

## 2023-06-07 DIAGNOSIS — F32.A DEPRESSIVE DISORDER: ICD-10-CM

## 2023-06-07 DIAGNOSIS — D72.819 LEUKOPENIA, UNSPECIFIED TYPE: ICD-10-CM

## 2023-06-07 PROCEDURE — 99396 PREV VISIT EST AGE 40-64: CPT | Performed by: FAMILY MEDICINE

## 2023-06-07 PROCEDURE — 99214 OFFICE O/P EST MOD 30 MIN: CPT | Performed by: FAMILY MEDICINE

## 2023-06-07 RX ORDER — FOLIC ACID/MULTIVIT,IRON,MINER 0.4MG-18MG
TABLET ORAL
COMMUNITY

## 2023-06-07 RX ORDER — ZINC 25 MG
TABLET ORAL
COMMUNITY

## 2023-06-07 NOTE — PROGRESS NOTES
Assessment/Plan:   1  Mild recurrent major depression (HCC)  Overall stable today  She denies any recent exacerbations  Continue with her current treatment of Lexapro  2  Hypothyroidism, unspecified type  Thyroid function appears stable  Continue with her current dose of levothyroxine   - TSH, 3rd generation with Free T4 reflex; Future    3  Gastroesophageal reflux disease without esophagitis  Overall stable  She has been using her omeprazole as needed which has been effective  Continue with dietary trigger avoidance  4  Hyperlipidemia, unspecified hyperlipidemia type  Triglycerides still appeared elevated  At this time, she is instructed on importance of maintaining a very strict low-fat/low-cholesterol diet  We will recheck her lipid panel again in 6 months  - Comprehensive metabolic panel; Future  - Lipid Panel with Direct LDL reflex; Future    5  Leukopenia, unspecified type  Patient appears clinically asymptomatic  Her white blood cell count was mildly low  We will recheck this level with differential in 2 months   - CBC; Future  - CBC and differential; Future    6  Elevated uric acid in blood  Uric acid level  Stable  She denies any recent flareups of gout  We will continue with routine monitoring  Follow-up with patient in 6 months   - Uric acid; Future    7  Postmenopausal state  - DXA bone density spine hip and pelvis; Future                 Diagnoses and all orders for this visit:    Mild recurrent major depression (HCC)    Hypothyroidism, unspecified type  -     TSH, 3rd generation with Free T4 reflex; Future    Gastroesophageal reflux disease without esophagitis    Hyperlipidemia, unspecified hyperlipidemia type  -     Comprehensive metabolic panel; Future  -     Lipid Panel with Direct LDL reflex; Future    Leukopenia, unspecified type  -     CBC;  Future  -     CBC and differential; Future    Sacroiliitis (HCC)    Annual physical exam    Depressive disorder    Elevated uric acid in blood  -     Uric acid; Future    Postmenopausal state  -     DXA bone density spine hip and pelvis; Future    Other orders  -     Omega-3 Fatty Acids (Omega-3 Fish Oil) 1200 MG CAPS; Take by mouth  -     Zinc 25 MG TABS; Take by mouth          Subjective:       Chief Complaint   Patient presents with   • Physical Exam     Blood work questions  Patient ID: Deandre Mendez is a 57 y o  female presents today for a follow-up on her chronic state  She has mild recurrent major depression, hypothyroidism, GERD, dyslipidemia  she has been taking her medications regularly  She denies adverse reactions with her medications  HPI    Review of Systems   Constitutional: Negative for activity change, chills, fatigue and fever  HENT: Negative for congestion, ear pain, sinus pressure and sore throat  Eyes: Negative for redness, itching and visual disturbance  Respiratory: Negative for cough and shortness of breath  Cardiovascular: Negative for chest pain and palpitations  Gastrointestinal: Negative for abdominal pain, diarrhea and nausea  Endocrine: Negative for cold intolerance and heat intolerance  Genitourinary: Negative for dysuria, flank pain and frequency  Musculoskeletal: Negative for arthralgias, back pain, gait problem and myalgias  Skin: Negative for color change  Allergic/Immunologic: Negative for environmental allergies  Neurological: Negative for dizziness, numbness and headaches  Psychiatric/Behavioral: Negative for behavioral problems and sleep disturbance  The following portions of the patient's history were reviewed and updated as appropriate : past family history, past medical history, past social history and past surgical history      Current Outpatient Medications:   •  Biotin 1 MG CAPS, BID, Disp: , Rfl:   •  Calcium Carb-Cholecalciferol (CALCIUM 1000 + D PO), 1,000 mg BID, Disp: , Rfl:   •  escitalopram (LEXAPRO) 10 mg tablet, TAKE 1 TABLET DAILY, Disp: 90 tablet, Rfl: "3  •  levothyroxine 88 mcg tablet, TAKE 1 TABLET DAILY, Disp: 90 tablet, Rfl: 3  •  Multiple Vitamins-Minerals (CENTRUM SILVER ADULT 50+ PO), Take by mouth, Disp: , Rfl:   •  Omega-3 Fatty Acids (Omega-3 Fish Oil) 1200 MG CAPS, Take by mouth, Disp: , Rfl:   •  Zinc 25 MG TABS, Take by mouth, Disp: , Rfl:          Objective:         Vitals:    06/07/23 1012   BP: 116/78   BP Location: Left arm   Patient Position: Sitting   Cuff Size: Adult   Pulse: 84   Temp: 98 7 °F (37 1 °C)   SpO2: 97%   Weight: 76 4 kg (168 lb 6 4 oz)   Height: 5' 4 5\" (1 638 m)     Physical Exam  Vitals reviewed  Constitutional:       Appearance: She is well-developed  HENT:      Head: Normocephalic and atraumatic  Nose: Nose normal       Mouth/Throat:      Pharynx: No oropharyngeal exudate  Eyes:      General: No scleral icterus  Right eye: No discharge  Left eye: No discharge  Pupils: Pupils are equal, round, and reactive to light  Neck:      Trachea: No tracheal deviation  Cardiovascular:      Rate and Rhythm: Normal rate and regular rhythm  Pulses:           Dorsalis pedis pulses are 2+ on the right side and 2+ on the left side  Posterior tibial pulses are 2+ on the right side and 2+ on the left side  Heart sounds: Normal heart sounds  No murmur heard  No friction rub  No gallop  Pulmonary:      Effort: Pulmonary effort is normal  No respiratory distress  Breath sounds: Normal breath sounds  No wheezing or rales  Abdominal:      General: Bowel sounds are normal  There is no distension  Palpations: Abdomen is soft  Tenderness: There is no abdominal tenderness  There is no guarding or rebound  Musculoskeletal:         General: Normal range of motion  Cervical back: Normal range of motion and neck supple  Lymphadenopathy:      Head:      Right side of head: No submental or submandibular adenopathy        Left side of head: No submental or submandibular " adenopathy  Cervical: No cervical adenopathy  Right cervical: No superficial, deep or posterior cervical adenopathy  Left cervical: No superficial, deep or posterior cervical adenopathy  Skin:     General: Skin is warm and dry  Findings: No erythema  Neurological:      Mental Status: She is alert and oriented to person, place, and time  Cranial Nerves: No cranial nerve deficit  Sensory: No sensory deficit  Psychiatric:         Mood and Affect: Mood is not anxious or depressed  Speech: Speech normal          Behavior: Behavior normal          Thought Content:  Thought content normal          Judgment: Judgment normal

## 2023-06-07 NOTE — PROGRESS NOTES
ADULT ANNUAL [address] [facility] [facility]    NAME: Deb Barnard  AGE: 57 y o  SEX: female  : 1960     DATE: 2023     Assessment and Plan:     Problem List Items Addressed This Visit        Digestive    Gastroesophageal reflux disease       Endocrine    Hypothyroidism       Musculoskeletal and Integument    Sacroiliitis ([facility] [address] )       Other    Depressive disorder    Mild recurrent major depression ([facility] [address] ) - Primary   Other Visit Diagnoses     Annual physical exam              Immunizations and preventive care screenings were discussed with patient today  Appropriate education was printed on patient's after visit summary  Counseling:  Alcohol/drug use: discussed moderation in alcohol intake, the recommendations for healthy alcohol use, and avoidance of illicit drug use  Dental Health: discussed importance of regular tooth brushing, flossing, and dental visits  Injury prevention: discussed safety/seat belts, safety helmets, smoke detectors, carbon dioxide detectors, and smoking near bedding or upholstery  Sexual health: discussed sexually transmitted diseases, partner selection, use of condoms, avoidance of unintended pregnancy, and contraceptive alternatives  · Exercise: the importance of regular exercise/physical activity was discussed  Recommend exercise 3-5 times per week for at least 30 minutes  Return in 6 months (on 2023)  Chief Complaint:     Chief Complaint   Patient presents with   • Physical Exam     Blood work questions  History of Present Illness:     Adult Annual Physical   Patient here for a comprehensive physical exam  The patient reports no problems  Diet and Physical Activity  · Diet/Nutrition: well balanced diet  · Exercise: walking  Depression Screening  PHQ-2/9 Depression Screening         General Health  · Sleep: sleeps well  · Hearing: diminished  · Vision: goes for regular eye exams  · Dental: regular dental visits  /GYN Health  · Patient is: postmenopausal       Review of Systems:     Review of Systems   Constitutional: Negative for activity change, chills, fatigue and fever  HENT: Negative for congestion, ear pain, sinus pressure and sore throat  Eyes: Negative for redness, itching and visual disturbance  Respiratory: Negative for cough and shortness of breath  Cardiovascular: Negative for chest pain and palpitations  Gastrointestinal: Negative for abdominal pain, diarrhea and nausea  Endocrine: Negative for cold intolerance and heat intolerance  Genitourinary: Negative for dysuria, flank pain and frequency  Musculoskeletal: Negative for arthralgias, back pain, gait problem and myalgias  Skin: Negative for color change  Allergic/Immunologic: Negative for environmental allergies  Neurological: Negative for dizziness, numbness and headaches  Psychiatric/Behavioral: Negative for behavioral problems and sleep disturbance  Past Medical History:     Past Medical History:   Diagnosis Date   • Chronic cough    • Depression    • GERD (gastroesophageal reflux disease)    • Hiatal hernia     resolved 12/7/16   • Hyperlipidemia    • Insomnia    • Post herpetic neuralgia     last assessed 7/13/16, resolved 12/7/16   • Sacroiliac pain       Past Surgical History:     Past Surgical History:   Procedure Laterality Date   • COLONOSCOPY     • EAR SURGERY     • GASTRIC BYPASS     • HYSTERECTOMY     • OOPHORECTOMY Bilateral    • NE COLONOSCOPY FLX DX W/COLLJ SPEC WHEN PFRMD N/A 2/5/2019    Procedure: COLONOSCOPY;  Surgeon: Maria Alejandra Cortés MD;  Location: [facility];   Service: General      Social History:     Social History     Socioeconomic History   • Marital status: /Civil Union     Spouse name: None   • Number of children: None   • Years of education: None   • Highest education level: None   Occupational History   • None   Tobacco Use   • Smoking status: Former Packs/day: 0 25     Types: Cigarettes     Start date: 3/10/1976     Quit date: 1976     Years since quittin 9   • Smokeless tobacco: Never   Vaping Use   • Vaping Use: Never used   Substance and Sexual Activity   • Alcohol use: Yes     Comment: SOCIAL   • Drug use: No   • Sexual activity: Yes     Partners: Male   Other Topics Concern   • None   Social History Narrative    Always uses seat belt     Feels safe at home      Social Determinants of Health     Financial Resource Strain: Not on file   Food Insecurity: Not on file   Transportation Needs: Not on file   Physical Activity: Not on file   Stress: Not on file   Social Connections: Not on file   Intimate Partner Violence: Not on file   Housing Stability: Not on file      Family History:     Family History   Problem Relation Age of Onset   • Diabetes Mother    • Lymphoma Mother 76        non-hodgkins   • Breast cancer Sister 48   • Stroke Father    • No Known Problems Daughter    • Breast cancer Maternal Grandmother         age at onset unknown   • No Known Problems Maternal Grandfather    • Breast cancer Paternal Grandmother         age at onset unknown   • No Known Problems Paternal Grandfather    • No Known Problems Daughter    • Breast cancer Maternal Aunt         age unknown   • No Known Problems Maternal Aunt    • No Known Problems Maternal Aunt    • No Known Problems Paternal Aunt    • No Known Problems Paternal Aunt    • No Known Problems Paternal Aunt       Current Medications:     Current Outpatient Medications   Medication Sig Dispense Refill   • Biotin 1 MG CAPS BID     • Calcium Carb-Cholecalciferol (CALCIUM 1000 + D PO) 1,000 mg BID     • escitalopram (LEXAPRO) 10 mg tablet TAKE 1 TABLET DAILY 90 tablet 3   • levothyroxine 88 mcg tablet TAKE 1 TABLET DAILY 90 tablet 3   • Multiple Vitamins-Minerals (CENTRUM SILVER ADULT 50+ PO) Take by mouth     • Omega-3 Fatty Acids (Omega-3 Fish Oil) 1200 MG CAPS Take by mouth     • Zinc 25 MG TABS Take "by mouth       No current facility-administered medications for this visit  Allergies: Allergies   Allergen Reactions   • Amoxicillin-Pot Clavulanate Facial Swelling and Swelling     Lip swelling      Physical Exam:     /78 (BP Location: Left arm, Patient Position: Sitting, Cuff Size: Adult)   Pulse 84   Temp 98 7 °F (37 1 °C)   Ht 5' 4 5\" (1 638 m)   Wt 76 4 kg (168 lb 6 4 oz)   LMP  (LMP Unknown)   SpO2 97%   BMI 28 46 kg/m²     Physical Exam  Vitals reviewed  Constitutional:       General: She is not in acute distress  Appearance: She is well-developed  She is not diaphoretic  HENT:      Head: Normocephalic and atraumatic  Right Ear: External ear normal       Left Ear: External ear normal       Nose: Nose normal    Eyes:      General:         Right eye: No discharge  Left eye: No discharge  Pupils: Pupils are equal, round, and reactive to light  Cardiovascular:      Rate and Rhythm: Normal rate and regular rhythm  Heart sounds: Normal heart sounds  No murmur heard  No friction rub  No gallop  Pulmonary:      Effort: Pulmonary effort is normal  No respiratory distress  Breath sounds: Normal breath sounds  No wheezing or rales  Abdominal:      General: Bowel sounds are normal  There is no distension  Palpations: Abdomen is soft  Tenderness: There is no abdominal tenderness  There is no guarding  Musculoskeletal:         General: Normal range of motion  Cervical back: Normal range of motion and neck supple  Lymphadenopathy:      Cervical: No cervical adenopathy  Skin:     General: Skin is warm and dry  Capillary Refill: Capillary refill takes less than 2 seconds  Findings: No erythema or rash  Neurological:      Mental Status: She is alert and oriented to person, place, and time  Cranial Nerves: No cranial nerve deficit  Psychiatric:         Behavior: Behavior normal          Thought Content:  Thought content " normal          Judgment: Judgment normal           Ihab DO RERE [facility]

## 2023-09-25 ENCOUNTER — HOSPITAL ENCOUNTER (OUTPATIENT)
Dept: BONE DENSITY | Facility: MEDICAL CENTER | Age: 63
Discharge: HOME | End: 2023-09-25
Payer: COMMERCIAL

## 2023-09-25 DIAGNOSIS — Z78.0 POSTMENOPAUSAL STATE: ICD-10-CM

## 2023-09-25 PROCEDURE — 77080 DXA BONE DENSITY AXIAL: CPT

## 2023-10-04 ENCOUNTER — APPOINTMENT (OUTPATIENT)
Dept: LAB | Facility: CLINIC | Age: 63
End: 2023-10-04
Payer: MEDICARE

## 2023-10-04 DIAGNOSIS — E78.5 HYPERLIPIDEMIA, UNSPECIFIED HYPERLIPIDEMIA TYPE: ICD-10-CM

## 2023-10-04 DIAGNOSIS — E79.0 ELEVATED URIC ACID IN BLOOD: ICD-10-CM

## 2023-10-04 DIAGNOSIS — E03.9 HYPOTHYROIDISM, UNSPECIFIED TYPE: ICD-10-CM

## 2023-10-04 DIAGNOSIS — D72.819 LEUKOPENIA, UNSPECIFIED TYPE: ICD-10-CM

## 2023-10-04 LAB
ALBUMIN SERPL BCP-MCNC: 3.7 G/DL (ref 3.5–5)
ALP SERPL-CCNC: 91 U/L (ref 34–104)
ALT SERPL W P-5'-P-CCNC: 29 U/L (ref 7–52)
ANION GAP SERPL CALCULATED.3IONS-SCNC: 10 MMOL/L
AST SERPL W P-5'-P-CCNC: 37 U/L (ref 13–39)
BASOPHILS # BLD AUTO: 0.06 THOUSANDS/ÂΜL (ref 0–0.1)
BASOPHILS NFR BLD AUTO: 2 % (ref 0–1)
BILIRUB SERPL-MCNC: 0.36 MG/DL (ref 0.2–1)
BUN SERPL-MCNC: 10 MG/DL (ref 5–25)
CALCIUM SERPL-MCNC: 9 MG/DL (ref 8.4–10.2)
CHLORIDE SERPL-SCNC: 105 MMOL/L (ref 96–108)
CHOLEST SERPL-MCNC: 202 MG/DL
CO2 SERPL-SCNC: 25 MMOL/L (ref 21–32)
CREAT SERPL-MCNC: 0.69 MG/DL (ref 0.6–1.3)
EOSINOPHIL # BLD AUTO: 0.19 THOUSAND/ÂΜL (ref 0–0.61)
EOSINOPHIL NFR BLD AUTO: 5 % (ref 0–6)
ERYTHROCYTE [DISTWIDTH] IN BLOOD BY AUTOMATED COUNT: 13.2 % (ref 11.6–15.1)
GFR SERPL CREATININE-BSD FRML MDRD: 91 ML/MIN/1.73SQ M
GLUCOSE P FAST SERPL-MCNC: 71 MG/DL (ref 65–99)
HCT VFR BLD AUTO: 42.2 % (ref 34.8–46.1)
HDLC SERPL-MCNC: 70 MG/DL
HGB BLD-MCNC: 13.7 G/DL (ref 11.5–15.4)
IMM GRANULOCYTES # BLD AUTO: 0.01 THOUSAND/UL (ref 0–0.2)
IMM GRANULOCYTES NFR BLD AUTO: 0 % (ref 0–2)
LDLC SERPL CALC-MCNC: 81 MG/DL (ref 0–100)
LYMPHOCYTES # BLD AUTO: 1.75 THOUSANDS/ÂΜL (ref 0.6–4.47)
LYMPHOCYTES NFR BLD AUTO: 47 % (ref 14–44)
MCH RBC QN AUTO: 30.8 PG (ref 26.8–34.3)
MCHC RBC AUTO-ENTMCNC: 32.5 G/DL (ref 31.4–37.4)
MCV RBC AUTO: 95 FL (ref 82–98)
MONOCYTES # BLD AUTO: 0.35 THOUSAND/ÂΜL (ref 0.17–1.22)
MONOCYTES NFR BLD AUTO: 9 % (ref 4–12)
NEUTROPHILS # BLD AUTO: 1.37 THOUSANDS/ÂΜL (ref 1.85–7.62)
NEUTS SEG NFR BLD AUTO: 37 % (ref 43–75)
NRBC BLD AUTO-RTO: 0 /100 WBCS
PLATELET # BLD AUTO: 307 THOUSANDS/UL (ref 149–390)
PMV BLD AUTO: 9.6 FL (ref 8.9–12.7)
POTASSIUM SERPL-SCNC: 4 MMOL/L (ref 3.5–5.3)
PROT SERPL-MCNC: 6.7 G/DL (ref 6.4–8.4)
RBC # BLD AUTO: 4.45 MILLION/UL (ref 3.81–5.12)
SODIUM SERPL-SCNC: 140 MMOL/L (ref 135–147)
TRIGL SERPL-MCNC: 253 MG/DL
TSH SERPL DL<=0.05 MIU/L-ACNC: 3.94 UIU/ML (ref 0.45–4.5)
URATE SERPL-MCNC: 7.1 MG/DL (ref 2–7.5)
WBC # BLD AUTO: 3.73 THOUSAND/UL (ref 4.31–10.16)

## 2023-10-04 PROCEDURE — 85025 COMPLETE CBC W/AUTO DIFF WBC: CPT

## 2023-10-04 PROCEDURE — 84550 ASSAY OF BLOOD/URIC ACID: CPT

## 2023-10-04 PROCEDURE — 84443 ASSAY THYROID STIM HORMONE: CPT

## 2023-10-04 PROCEDURE — 80061 LIPID PANEL: CPT

## 2023-10-04 PROCEDURE — 80053 COMPREHEN METABOLIC PANEL: CPT

## 2023-10-04 PROCEDURE — 36415 COLL VENOUS BLD VENIPUNCTURE: CPT

## 2023-12-08 ENCOUNTER — OFFICE VISIT (OUTPATIENT)
Dept: FAMILY MEDICINE CLINIC | Facility: CLINIC | Age: 63
End: 2023-12-08
Payer: MEDICARE

## 2023-12-08 VITALS
TEMPERATURE: 97.7 F | SYSTOLIC BLOOD PRESSURE: 128 MMHG | OXYGEN SATURATION: 97 % | DIASTOLIC BLOOD PRESSURE: 78 MMHG | HEIGHT: 65 IN | BODY MASS INDEX: 27.63 KG/M2 | WEIGHT: 165.8 LBS | HEART RATE: 73 BPM

## 2023-12-08 DIAGNOSIS — E78.5 HYPERLIPIDEMIA, UNSPECIFIED HYPERLIPIDEMIA TYPE: ICD-10-CM

## 2023-12-08 DIAGNOSIS — Z13.1 SCREENING FOR DIABETES MELLITUS: ICD-10-CM

## 2023-12-08 DIAGNOSIS — F32.A DEPRESSIVE DISORDER: ICD-10-CM

## 2023-12-08 DIAGNOSIS — R79.9 ABNORMAL FINDING OF BLOOD CHEMISTRY, UNSPECIFIED: ICD-10-CM

## 2023-12-08 DIAGNOSIS — Z12.31 ENCOUNTER FOR SCREENING MAMMOGRAM FOR MALIGNANT NEOPLASM OF BREAST: ICD-10-CM

## 2023-12-08 DIAGNOSIS — E79.0 ELEVATED URIC ACID IN BLOOD: ICD-10-CM

## 2023-12-08 DIAGNOSIS — E03.9 HYPOTHYROIDISM, UNSPECIFIED TYPE: ICD-10-CM

## 2023-12-08 DIAGNOSIS — F33.0 MILD RECURRENT MAJOR DEPRESSION (HCC): Primary | ICD-10-CM

## 2023-12-08 DIAGNOSIS — D72.819 LEUKOPENIA, UNSPECIFIED TYPE: ICD-10-CM

## 2023-12-08 PROCEDURE — 99214 OFFICE O/P EST MOD 30 MIN: CPT | Performed by: FAMILY MEDICINE

## 2023-12-08 RX ORDER — ESCITALOPRAM OXALATE 10 MG/1
15 TABLET ORAL DAILY
Qty: 135 TABLET | Refills: 0
Start: 2023-12-08 | End: 2024-03-07

## 2023-12-08 NOTE — PROGRESS NOTES
Assessment/Plan:   1. Mild recurrent major depression ([address])  Reviewed patient's symptoms today. Her mood has been slightly worsening. At this time, we will increase her Lexapro to 15 mg daily. She is advised on importance of maintaining strict activity. If symptoms are not improving, she is advised to call or follow-up. 2. Hypothyroidism, unspecified type  Thyroid function testing has been stable. Continue with her current dose of levothyroxine  - TSH, 3rd generation with Free T4 reflex; Future    3. Hyperlipidemia, unspecified hyperlipidemia type  Reviewed lipid panel. Her HDL. Very well-controlled however her triglycerides were mildly elevated. Continue with her fish oil. If persistently elevated, will consider treatment with fenofibrate in the future. - Comprehensive metabolic panel; Future  - Lipid Panel with Direct LDL reflex; Future    4. Encounter for screening mammogram for malignant neoplasm of breast  - Mammo screening bilateral w 3d & cad; Future    6. Leukopenia, unspecified type  Patient appears clinically stable. Reviewed her CBC with her. At this time, her leukopenia may likely be due to dietary changes/her previous gastric/bariatric surgery. She may benefit from taking an iron supplement. - CBC; Future    7. Screening for diabetes mellitus  - Hemoglobin A1C; Future    8. Elevated uric acid in blood  - Uric acid; Future             Diagnoses and all orders for this visit:    Mild recurrent major depression (HCC)    Hypothyroidism, unspecified type  -     TSH, 3rd generation with Free T4 reflex; Future    Hyperlipidemia, unspecified hyperlipidemia type  -     Comprehensive metabolic panel; Future  -     Lipid Panel with Direct LDL reflex; Future    Depressive disorder  -     escitalopram (LEXAPRO) 10 mg tablet;  Take 1.5 tablets (15 mg total) by mouth daily    Encounter for screening mammogram for malignant neoplasm of breast  -     Mammo screening bilateral w 3d & cad; Future    Leukopenia, unspecified type  -     CBC; Future    Screening for diabetes mellitus  -     Hemoglobin A1C; Future    Elevated uric acid in blood  -     Uric acid; Future    Abnormal finding of blood chemistry, unspecified  -     Hemoglobin A1C; Future          Subjective:       Chief Complaint   Patient presents with    Follow-up      Patient ID: Xena Mixon is a 70 y.o. female presents today for a follow-up on her chronic conditions. She has depression, hypothyroidism, dyslipidemia, elevated uric acid. She is been taking her medications regularly. She denies adverse reactions with her medications. She states that her stress levels have been mildly elevated secondary to home as well as work stressors. HPI    Review of Systems   Constitutional:  Negative for activity change, chills, fatigue and fever. HENT:  Negative for congestion, ear pain, sinus pressure and sore throat. Eyes:  Negative for redness, itching and visual disturbance. Respiratory:  Negative for cough and shortness of breath. Cardiovascular:  Negative for chest pain and palpitations. Gastrointestinal:  Negative for abdominal pain, diarrhea and nausea. Endocrine: Negative for cold intolerance and heat intolerance. Genitourinary:  Negative for dysuria, flank pain and frequency. Musculoskeletal:  Negative for arthralgias, back pain, gait problem and myalgias. Skin:  Negative for color change. Allergic/Immunologic: Negative for environmental allergies. Neurological:  Negative for dizziness, numbness and headaches. Psychiatric/Behavioral:  Negative for behavioral problems and sleep disturbance. The following portions of the patient's history were reviewed and updated as appropriate : past family history, past medical history, past social history and past surgical history.     Current Outpatient Medications:     Biotin 1 MG CAPS, BID, Disp: , Rfl:     Calcium Carb-Cholecalciferol (CALCIUM 1000 + D PO), 1,000 mg BID, Disp: , Rfl:     escitalopram (LEXAPRO) 10 mg tablet, Take 1.5 tablets (15 mg total) by mouth daily, Disp: 135 tablet, Rfl: 0    levothyroxine 88 mcg tablet, TAKE 1 TABLET DAILY, Disp: 90 tablet, Rfl: 3    Multiple Vitamins-Minerals (CENTRUM SILVER ADULT 50+ PO), Take by mouth, Disp: , Rfl:     Omega-3 Fatty Acids (Omega-3 Fish Oil) 1200 MG CAPS, Take by mouth, Disp: , Rfl:     Zinc 25 MG TABS, Take by mouth, Disp: , Rfl:          Objective:         Vitals:    12/08/23 0831   BP: 128/78   BP Location: Left arm   Patient Position: Sitting   Cuff Size: Adult   Pulse: 73   Temp: 97.7 °F (36.5 °C)   SpO2: 97%   Weight: 75.2 kg (165 lb 12.8 oz)   Height: 5' 4.5" (1.638 m)     Physical Exam  Vitals reviewed. Constitutional:       Appearance: She is well-developed. HENT:      Head: Normocephalic and atraumatic. Nose: Nose normal.      Mouth/Throat:      Pharynx: No oropharyngeal exudate. Eyes:      General: No scleral icterus. Right eye: No discharge. Left eye: No discharge. Pupils: Pupils are equal, round, and reactive to light. Neck:      Trachea: No tracheal deviation. Cardiovascular:      Rate and Rhythm: Normal rate and regular rhythm. Pulses:           Dorsalis pedis pulses are 2+ on the right side and 2+ on the left side. Posterior tibial pulses are 2+ on the right side and 2+ on the left side. Heart sounds: Normal heart sounds. No murmur heard. No friction rub. No gallop. Pulmonary:      Effort: Pulmonary effort is normal. No respiratory distress. Breath sounds: Normal breath sounds. No wheezing or rales. Abdominal:      General: Bowel sounds are normal. There is no distension. Palpations: Abdomen is soft. Tenderness: There is no abdominal tenderness. There is no guarding or rebound. Musculoskeletal:         General: Normal range of motion. Cervical back: Normal range of motion and neck supple.    Lymphadenopathy: Head:      Right side of head: No submental or submandibular adenopathy. Left side of head: No submental or submandibular adenopathy. Cervical: No cervical adenopathy. Right cervical: No superficial, deep or posterior cervical adenopathy. Left cervical: No superficial, deep or posterior cervical adenopathy. Skin:     General: Skin is warm and dry. Findings: No erythema. Neurological:      Mental Status: She is alert and oriented to person, place, and time. Cranial Nerves: No cranial nerve deficit. Sensory: No sensory deficit. Psychiatric:         Mood and Affect: Mood is not anxious or depressed. Speech: Speech normal.         Behavior: Behavior normal.         Thought Content:  Thought content normal.         Judgment: Judgment normal.

## 2024-01-23 DIAGNOSIS — F32.A DEPRESSIVE DISORDER: ICD-10-CM

## 2024-01-23 RX ORDER — ESCITALOPRAM OXALATE 10 MG/1
10 TABLET ORAL DAILY
Qty: 90 TABLET | Refills: 1 | Status: SHIPPED | OUTPATIENT
Start: 2024-01-23

## 2024-01-29 DIAGNOSIS — E03.9 HYPOTHYROIDISM, UNSPECIFIED TYPE: ICD-10-CM

## 2024-01-29 RX ORDER — LEVOTHYROXINE SODIUM 88 UG/1
TABLET ORAL
Qty: 90 TABLET | Refills: 1 | Status: SHIPPED | OUTPATIENT
Start: 2024-01-29

## 2024-02-21 PROBLEM — Z12.11 COLON CANCER SCREENING: Status: RESOLVED | Noted: 2019-01-23 | Resolved: 2024-02-21

## 2024-03-20 ENCOUNTER — HOSPITAL ENCOUNTER (OUTPATIENT)
Dept: MAMMOGRAPHY | Facility: MEDICAL CENTER | Age: 64
Discharge: HOME | End: 2024-03-20
Payer: COMMERCIAL

## 2024-03-20 VITALS — BODY MASS INDEX: 27.47 KG/M2 | HEIGHT: 65 IN | WEIGHT: 164.9 LBS

## 2024-03-20 DIAGNOSIS — Z12.31 ENCOUNTER FOR SCREENING MAMMOGRAM FOR MALIGNANT NEOPLASM OF BREAST: ICD-10-CM

## 2024-03-20 PROCEDURE — 77067 SCR MAMMO BI INCL CAD: CPT

## 2024-03-20 PROCEDURE — 77063 BREAST TOMOSYNTHESIS BI: CPT

## 2024-06-07 ENCOUNTER — TELEPHONE (OUTPATIENT)
Age: 64
End: 2024-06-07

## 2024-06-07 NOTE — TELEPHONE ENCOUNTER
Patient called to ask if she had active lab orders in the computer. I was able to verify that the patient did have lab orders. No other questions or comments.

## 2024-06-11 ENCOUNTER — APPOINTMENT (OUTPATIENT)
Dept: LAB | Facility: CLINIC | Age: 64
End: 2024-06-11
Payer: COMMERCIAL

## 2024-06-11 DIAGNOSIS — Z13.1 SCREENING FOR DIABETES MELLITUS: ICD-10-CM

## 2024-06-11 DIAGNOSIS — D72.819 LEUKOPENIA, UNSPECIFIED TYPE: ICD-10-CM

## 2024-06-11 DIAGNOSIS — R79.9 ABNORMAL FINDING OF BLOOD CHEMISTRY, UNSPECIFIED: ICD-10-CM

## 2024-06-11 DIAGNOSIS — E03.9 HYPOTHYROIDISM, UNSPECIFIED TYPE: ICD-10-CM

## 2024-06-11 DIAGNOSIS — E78.5 HYPERLIPIDEMIA, UNSPECIFIED HYPERLIPIDEMIA TYPE: ICD-10-CM

## 2024-06-11 DIAGNOSIS — E79.0 ELEVATED URIC ACID IN BLOOD: ICD-10-CM

## 2024-06-11 LAB
ALBUMIN SERPL BCP-MCNC: 3.8 G/DL (ref 3.5–5)
ALP SERPL-CCNC: 92 U/L (ref 34–104)
ALT SERPL W P-5'-P-CCNC: 20 U/L (ref 7–52)
ANION GAP SERPL CALCULATED.3IONS-SCNC: 9 MMOL/L (ref 4–13)
AST SERPL W P-5'-P-CCNC: 28 U/L (ref 13–39)
BILIRUB SERPL-MCNC: 0.38 MG/DL (ref 0.2–1)
BUN SERPL-MCNC: 7 MG/DL (ref 5–25)
CALCIUM SERPL-MCNC: 9 MG/DL (ref 8.4–10.2)
CHLORIDE SERPL-SCNC: 100 MMOL/L (ref 96–108)
CHOLEST SERPL-MCNC: 211 MG/DL
CO2 SERPL-SCNC: 26 MMOL/L (ref 21–32)
CREAT SERPL-MCNC: 0.64 MG/DL (ref 0.6–1.3)
ERYTHROCYTE [DISTWIDTH] IN BLOOD BY AUTOMATED COUNT: 13.2 % (ref 11.6–15.1)
EST. AVERAGE GLUCOSE BLD GHB EST-MCNC: 108 MG/DL
GFR SERPL CREATININE-BSD FRML MDRD: 93 ML/MIN/1.73SQ M
GLUCOSE P FAST SERPL-MCNC: 68 MG/DL (ref 65–99)
HBA1C MFR BLD: 5.4 %
HCT VFR BLD AUTO: 43.3 % (ref 34.8–46.1)
HDLC SERPL-MCNC: 73 MG/DL
HGB BLD-MCNC: 14.3 G/DL (ref 11.5–15.4)
LDLC SERPL CALC-MCNC: 86 MG/DL (ref 0–100)
MCH RBC QN AUTO: 29.9 PG (ref 26.8–34.3)
MCHC RBC AUTO-ENTMCNC: 33 G/DL (ref 31.4–37.4)
MCV RBC AUTO: 90 FL (ref 82–98)
PLATELET # BLD AUTO: 310 THOUSANDS/UL (ref 149–390)
PMV BLD AUTO: 9.3 FL (ref 8.9–12.7)
POTASSIUM SERPL-SCNC: 4.2 MMOL/L (ref 3.5–5.3)
PROT SERPL-MCNC: 7.1 G/DL (ref 6.4–8.4)
RBC # BLD AUTO: 4.79 MILLION/UL (ref 3.81–5.12)
SODIUM SERPL-SCNC: 135 MMOL/L (ref 135–147)
T4 FREE SERPL-MCNC: 0.74 NG/DL (ref 0.61–1.12)
TRIGL SERPL-MCNC: 260 MG/DL
TSH SERPL DL<=0.05 MIU/L-ACNC: 5.38 UIU/ML (ref 0.45–4.5)
URATE SERPL-MCNC: 6.3 MG/DL (ref 2–7.5)
WBC # BLD AUTO: 4.2 THOUSAND/UL (ref 4.31–10.16)

## 2024-06-11 PROCEDURE — 85027 COMPLETE CBC AUTOMATED: CPT

## 2024-06-11 PROCEDURE — 80061 LIPID PANEL: CPT

## 2024-06-11 PROCEDURE — 36415 COLL VENOUS BLD VENIPUNCTURE: CPT

## 2024-06-11 PROCEDURE — 84550 ASSAY OF BLOOD/URIC ACID: CPT

## 2024-06-11 PROCEDURE — 83036 HEMOGLOBIN GLYCOSYLATED A1C: CPT

## 2024-06-11 PROCEDURE — 84443 ASSAY THYROID STIM HORMONE: CPT

## 2024-06-11 PROCEDURE — 84439 ASSAY OF FREE THYROXINE: CPT

## 2024-06-11 PROCEDURE — 80053 COMPREHEN METABOLIC PANEL: CPT

## 2024-06-12 ENCOUNTER — TELEPHONE (OUTPATIENT)
Dept: FAMILY MEDICINE CLINIC | Facility: CLINIC | Age: 64
End: 2024-06-12

## 2024-06-13 ENCOUNTER — OFFICE VISIT (OUTPATIENT)
Dept: FAMILY MEDICINE CLINIC | Facility: CLINIC | Age: 64
End: 2024-06-13
Payer: COMMERCIAL

## 2024-06-13 VITALS
HEART RATE: 90 BPM | WEIGHT: 165 LBS | OXYGEN SATURATION: 96 % | HEIGHT: 65 IN | BODY MASS INDEX: 27.49 KG/M2 | TEMPERATURE: 97.6 F | SYSTOLIC BLOOD PRESSURE: 110 MMHG | DIASTOLIC BLOOD PRESSURE: 68 MMHG

## 2024-06-13 DIAGNOSIS — E78.5 HYPERLIPIDEMIA, UNSPECIFIED HYPERLIPIDEMIA TYPE: ICD-10-CM

## 2024-06-13 DIAGNOSIS — Z00.00 WELCOME TO MEDICARE PREVENTIVE VISIT: Primary | ICD-10-CM

## 2024-06-13 DIAGNOSIS — F33.0 MILD RECURRENT MAJOR DEPRESSION (HCC): ICD-10-CM

## 2024-06-13 DIAGNOSIS — E03.9 HYPOTHYROIDISM, UNSPECIFIED TYPE: ICD-10-CM

## 2024-06-13 DIAGNOSIS — K21.9 GASTROESOPHAGEAL REFLUX DISEASE WITHOUT ESOPHAGITIS: ICD-10-CM

## 2024-06-13 DIAGNOSIS — D72.819 LEUKOPENIA, UNSPECIFIED TYPE: ICD-10-CM

## 2024-06-13 PROCEDURE — 99214 OFFICE O/P EST MOD 30 MIN: CPT | Performed by: FAMILY MEDICINE

## 2024-06-13 PROCEDURE — G0439 PPPS, SUBSEQ VISIT: HCPCS | Performed by: FAMILY MEDICINE

## 2024-06-13 RX ORDER — FENOFIBRATE 54 MG/1
54 TABLET ORAL DAILY
Qty: 30 TABLET | Refills: 5 | Status: SHIPPED | OUTPATIENT
Start: 2024-06-13

## 2024-06-13 RX ORDER — SPIRONOLACTONE 50 MG/1
TABLET, FILM COATED ORAL
COMMUNITY
Start: 2024-06-06

## 2024-06-13 NOTE — PROGRESS NOTES
Ambulatory Visit  Name: Radha Pena      : 1960      MRN: [id number]  Encounter Provider: Mio Jimenes DO  Encounter Date: 2024   Encounter department: [facility]    Assessment & Plan   1. Mild recurrent major depression (HCC)  Mood appears stable today.  She denies any recent exacerbations.  Continue with her current treatment with escitalopram.  - TSH, 3rd generation with Free T4 reflex; Future    2. Hyperlipidemia, unspecified hyperlipidemia type  Triglycerides appear to be persistently increasing.  At this time, we will start treatment with fenofibrate.  Recheck CMP and lipid panel in 6 months.  - fenofibrate (TRICOR) 54 MG tablet; Take 1 tablet (54 mg total) by mouth daily  Dispense: 30 tablet; Refill: 5  - Comprehensive metabolic panel; Future  - Comprehensive metabolic panel  - Comprehensive metabolic panel; Future  - Lipid Panel with Direct LDL reflex; Future    3. Hypothyroidism, unspecified type  Thyroid-stimulating hormone was minimally elevated, her free T4 was normal.  Recheck thyroid function in 2 months.  Continue with current dose of levothyroxine.  - TSH, 3rd generation with Free T4 reflex; Future  - TSH, 3rd generation with Free T4 reflex  - TSH, 3rd generation with Free T4 reflex; Future    4. Gastroesophageal reflux disease without esophagitis  Stable.  She has been using omeprazole intermittently.  This has been very effective.  Continue with dietary trigger avoidance.         Preventive health issues were discussed with patient, and age appropriate screening tests were ordered as noted in patient's After Visit Summary. Personalized health advice and appropriate referrals for health education or preventive services given if needed, as noted in patient's After Visit Summary.    History of Present Illness     HPI   Patient is a 63-year-old female presents today for her welcome to Medicare visit as well as a follow-up on her chronic conditions.  She has  depression, hypertriglyceridemia, hypothyroidism, GERD.  She been taking her medications regularly.  She denies adverse reactions with her medications.  Patient Care Team:  Mio Jimenes DO as PCP - General  Mio Jimenes DO as PCP - PCP-[facility] Attributed-DO Shayna Kendrick DO Marian McDonald, MD as Endoscopist    Review of Systems   Constitutional:  Negative for activity change, chills, fatigue and fever.   HENT:  Negative for congestion, ear pain, sinus pressure and sore throat.    Eyes:  Negative for redness, itching and visual disturbance.   Respiratory:  Negative for cough and shortness of breath.    Cardiovascular:  Negative for chest pain and palpitations.   Gastrointestinal:  Negative for abdominal pain, diarrhea and nausea.   Endocrine: Negative for cold intolerance and heat intolerance.   Genitourinary:  Negative for dysuria, flank pain and frequency.   Musculoskeletal:  Negative for arthralgias, back pain, gait problem and myalgias.   Skin:  Negative for color change.   Allergic/Immunologic: Negative for environmental allergies.   Neurological:  Negative for dizziness, numbness and headaches.   Psychiatric/Behavioral:  Negative for behavioral problems and sleep disturbance.      Medical History Reviewed by provider this encounter:       Annual Wellness Visit Questionnaire   Radha is here for her Welcome to Medicare visit.     Health Risk Assessment:   Patient rates overall health as good. Patient feels that their physical health rating is same. Patient is satisfied with their life. Eyesight was rated as same. Hearing was rated as slightly worse. Patient feels that their emotional and mental health rating is same. Patients states they are sometimes angry. Patient states they are sometimes unusually tired/fatigued. Pain experienced in the last 7 days has been none. Patient states that she has experienced no weight loss or gain in last 6 months.      Depression Screening:   PHQ-9 Score: 2      Fall Risk Screening:   In the past year, patient has experienced: no history of falling in past year      Urinary Incontinence Screening:   Patient has leaked urine accidently in the last six months. only during a sneezing spell    Home Safety:  Patient does not have trouble with stairs inside or outside of their home. Patient has working smoke alarms and has no working carbon monoxide detector. Home safety hazards include: none.     Nutrition:   Current diet is Regular.     Medications:   Patient is currently taking over-the-counter supplements. OTC medications include: see medication list. Patient is able to manage medications.     Activities of Daily Living (ADLs)/Instrumental Activities of Daily Living (IADLs):   Walk and transfer into and out of bed and chair?: Yes  Dress and groom yourself?: Yes    Bathe or shower yourself?: Yes    Feed yourself? Yes  Do your laundry/housekeeping?: Yes  Manage your money, pay your bills and track your expenses?: Yes  Make your own meals?: Yes    Do your own shopping?: Yes    Previous Hospitalizations:   Any hospitalizations or ED visits within the last 12 months?: No      Advance Care Planning:   Living will: Yes    Durable POA for healthcare: Yes    Advanced directive: Yes    Advanced directive counseling given: Yes    Five wishes given: No    Patient declined ACP directive: No      PREVENTIVE SCREENINGS      Cardiovascular Screening:    General: Screening Not Indicated, History Lipid Disorder, Risks and Benefits Discussed and Screening Current      Diabetes Screening:     General: Screening Current and Risks and Benefits Discussed      Colorectal Cancer Screening:     General: Screening Current and Risks and Benefits Discussed      Breast Cancer Screening:     General: Screening Current and Risks and Benefits Discussed      Cervical Cancer Screening:    General: Screening Not Indicated, Risks and Benefits Discussed and  Screening Current      Osteoporosis Screening:    General: Risks and Benefits Discussed and Screening Current      Abdominal Aortic Aneurysm (AAA) Screening:        General: Risks and Benefits Discussed and Screening Not Indicated      Lung Cancer Screening:     General: Screening Not Indicated and Risks and Benefits Discussed      Hepatitis C Screening:    General: Screening Current and Risks and Benefits Discussed    Screening, Brief Intervention, and Referral to Treatment (SBIRT)    Screening  Typical number of drinks in a day: 3  Typical number of drinks in a week: 8  Interpretation: Low risk drinking behavior.    AUDIT-C Screenin) How often did you have a drink containing alcohol in the past year? 2 to 3 times a week  2) How many drinks did you have on a typical day when you were drinking in the past year? 3 to 4  3) How often did you have 6 or more drinks on one occasion in the past year? less than monthly    AUDIT-C Score: 4  Interpretation: Score 3-12 (female): POSITIVE screen for alcohol misuse    AUDIT Screenin) How often during the last year have you found that you were not able to stop drinking once you had started? 1 - less than monthly  5) How often during the last year have you failed to do what was normally expected from you because of drinking? 0 - never  6) How often during the last year have you needed a first drink in the morning to get yourself going after a heavy drinking session? 0 - never  7) How often during the last year have you had a feeling of guilt or remorse after drinking? 1 - less than monthly  8) How often during the last year have you been unable to remember what happened the night before because you had been drinking? 0 - never  9) Have you or someone else been injured as a result of your drinking? 2 - yes, but not in the last year  10) Has a relative or friend or a doctor or another health worker been concerned about your drinking or suggested you cut down? 2 - yes, but  not in the last year    AUDIT Score: 10  Interpretation: Harmful or hazardous alcohol consumption    Single Item Drug Screening:  How often have you used an illegal drug (including marijuana) or a prescription medication for non-medical reasons in the past year? never    Single Item Drug Screen Score: 0  Interpretation: Negative screen for possible drug use disorder    Social Determinants of Health     Food Insecurity: No Food Insecurity (6/12/2024)    Hunger Vital Sign     Worried About Running Out of Food in the Last Year: Never true     Ran Out of Food in the Last Year: Never true   Transportation Needs: No Transportation Needs (6/12/2024)    PRAPARE - Transportation     Lack of Transportation (Medical): No     Lack of Transportation (Non-Medical): No   Housing Stability: Low Risk  (6/12/2024)    Housing Stability Vital Sign     Unable to Pay for Housing in the Last Year: No     Number of Times Moved in the Last Year: 0     Homeless in the Last Year: No   Utilities: Not At Risk (6/12/2024)    [facility] Utilities     Threatened with loss of utilities: No     No results found.    Objective     LMP  (LMP Unknown)     Physical Exam  Vitals reviewed.   Constitutional:       General: She is not in acute distress.     Appearance: Normal appearance. She is well-developed.   HENT:      Head: Normocephalic and atraumatic.      Right Ear: Tympanic membrane, ear canal and external ear normal. There is no impacted cerumen.      Left Ear: Tympanic membrane, ear canal and external ear normal. There is no impacted cerumen.      Nose: Nose normal. No congestion or rhinorrhea.      Mouth/Throat:      Mouth: Mucous membranes are moist.      Pharynx: No oropharyngeal exudate or posterior oropharyngeal erythema.   Eyes:      General: No scleral icterus.        Right eye: No discharge.         Left eye: No discharge.      Extraocular Movements: Extraocular movements intact.      Conjunctiva/sclera: Conjunctivae normal.      Pupils: Pupils  are equal, round, and reactive to light.   Neck:      Trachea: No tracheal deviation.   Cardiovascular:      Rate and Rhythm: Normal rate and regular rhythm.      Pulses: Normal pulses.           Dorsalis pedis pulses are 2+ on the right side and 2+ on the left side.        Posterior tibial pulses are 2+ on the right side and 2+ on the left side.      Heart sounds: Normal heart sounds. No murmur heard.     No friction rub. No gallop.   Pulmonary:      Effort: Pulmonary effort is normal. No respiratory distress.      Breath sounds: Normal breath sounds. No wheezing, rhonchi or rales.   Abdominal:      General: Bowel sounds are normal. There is no distension.      Palpations: Abdomen is soft.      Tenderness: There is no abdominal tenderness. There is no guarding or rebound.   Musculoskeletal:         General: Normal range of motion.      Cervical back: Normal range of motion and neck supple.      Right lower leg: No edema.      Left lower leg: No edema.   Lymphadenopathy:      Head:      Right side of head: No submental or submandibular adenopathy.      Left side of head: No submental or submandibular adenopathy.      Cervical: No cervical adenopathy.      Right cervical: No superficial, deep or posterior cervical adenopathy.     Left cervical: No superficial, deep or posterior cervical adenopathy.   Skin:     General: Skin is warm and dry.      Findings: No erythema.   Neurological:      General: No focal deficit present.      Mental Status: She is alert and oriented to person, place, and time.      Cranial Nerves: No cranial nerve deficit.      Sensory: Sensation is intact. No sensory deficit.      Motor: Motor function is intact.   Psychiatric:         Attention and Perception: Attention and perception normal.         Mood and Affect: Mood is not anxious or depressed.         Speech: Speech normal.         Behavior: Behavior normal.         Thought Content: Thought content normal.         Judgment: Judgment  normal.     Administrative Statements

## 2024-06-13 NOTE — PATIENT INSTRUCTIONS
Medicare Preventive Visit Patient Instructions  Thank you for completing your Welcome to Medicare Visit or Medicare Annual Wellness Visit today. Your next wellness visit will be due in one year (6/14/2025).  The screening/preventive services that you may require over the next 5-10 years are detailed below. Some tests may not apply to you based off risk factors and/or age. Screening tests ordered at today's visit but not completed yet may show as past due. Also, please note that scanned in results may not display below.  Preventive Screenings:  Service Recommendations Previous Testing/Comments   Colorectal Cancer Screening  * Colonoscopy    * Fecal Occult Blood Test (FOBT)/Fecal Immunochemical Test (FIT)  * Fecal DNA/Cologuard Test  * Flexible Sigmoidoscopy Age: 45-75 years old   Colonoscopy: every 10 years (may be performed more frequently if at higher risk)  OR  FOBT/FIT: every 1 year  OR  Cologuard: every 3 years  OR  Sigmoidoscopy: every 5 years  Screening may be recommended earlier than age 45 if at higher risk for colorectal cancer. Also, an individualized decision between you and your healthcare provider will decide whether screening between the ages of 76-85 would be appropriate. Colonoscopy: 02/05/2019  FOBT/FIT: Not on file  Cologuard: Not on file  Sigmoidoscopy: Not on file    Screening Current     Breast Cancer Screening Age: 40+ years old  Frequency: every 1-2 years  Not required if history of left and right mastectomy Mammogram: 03/20/2024    Screening Current   Cervical Cancer Screening Between the ages of 21-29, pap smear recommended once every 3 years.   Between the ages of 30-65, can perform pap smear with HPV co-testing every 5 years.   Recommendations may differ for women with a history of total hysterectomy, cervical cancer, or abnormal pap smears in past. Pap Smear: 01/31/2017    Screening Not Indicated   Hepatitis C Screening Once for adults born between 1945 and 1965  More frequently in  patients at high risk for Hepatitis C Hep C Antibody: Not on file    Screening Current   Diabetes Screening 1-2 times per year if you're at risk for diabetes or have pre-diabetes Fasting glucose: 68 mg/dL (6/11/2024)  A1C: 5.4 % (6/11/2024)  Screening Current   Cholesterol Screening Once every 5 years if you don't have a lipid disorder. May order more often based on risk factors. Lipid panel: 06/11/2024    Screening Not Indicated  History Lipid Disorder     Other Preventive Screenings Covered by Medicare:  Abdominal Aortic Aneurysm (AAA) Screening: covered once if your at risk. You're considered to be at risk if you have a family history of AAA.  Lung Cancer Screening: covers low dose CT scan once per year if you meet all of the following conditions: (1) Age 55-77; (2) No signs or symptoms of lung cancer; (3) Current smoker or have quit smoking within the last 15 years; (4) You have a tobacco smoking history of at least 20 pack years (packs per day multiplied by number of years you smoked); (5) You get a written order from a healthcare provider.  Glaucoma Screening: covered annually if you're considered high risk: (1) You have diabetes OR (2) Family history of glaucoma OR (3)  aged 50 and older OR (4)  American aged 65 and older  Osteoporosis Screening: covered every 2 years if you meet one of the following conditions: (1) You're estrogen deficient and at risk for osteoporosis based off medical history and other findings; (2) Have a vertebral abnormality; (3) On glucocorticoid therapy for more than 3 months; (4) Have primary hyperparathyroidism; (5) On osteoporosis medications and need to assess response to drug therapy.   Last bone density test (DXA Scan): 09/28/2023.  HIV Screening: covered annually if you're between the age of 15-65. Also covered annually if you are younger than 15 and older than 65 with risk factors for HIV infection. For pregnant patients, it is covered up to 3 times per  pregnancy.    Immunizations:  Immunization Recommendations   Influenza Vaccine Annual influenza vaccination during flu season is recommended for all persons aged >= 6 months who do not have contraindications   Pneumococcal Vaccine   * Pneumococcal conjugate vaccine = PCV13 (Prevnar 13), PCV15 (Vaxneuvance), PCV20 (Prevnar 20)  * Pneumococcal polysaccharide vaccine = PPSV23 (Pneumovax) Adults 19-65 yo with certain risk factors or if 65+ yo  If never received any pneumonia vaccine: recommend Prevnar 20 (PCV20)  Give PCV20 if previously received 1 dose of PCV13 or PPSV23   Hepatitis B Vaccine 3 dose series if at intermediate or high risk (ex: diabetes, end stage renal disease, liver disease)   Respiratory syncytial virus (RSV) Vaccine - COVERED BY MEDICARE PART D  * RSVPreF3 (Arexvy) CDC recommends that adults 60 years of age and older may receive a single dose of RSV vaccine using shared clinical decision-making (SCDM)   Tetanus (Td) Vaccine - COST NOT COVERED BY MEDICARE PART B Following completion of primary series, a booster dose should be given every 10 years to maintain immunity against tetanus. Td may also be given as tetanus wound prophylaxis.   Tdap Vaccine - COST NOT COVERED BY MEDICARE PART B Recommended at least once for all adults. For pregnant patients, recommended with each pregnancy.   Shingles Vaccine (Shingrix) - COST NOT COVERED BY MEDICARE PART B  2 shot series recommended in those 19 years and older who have or will have weakened immune systems or those 50 years and older     Health Maintenance Due:      Topic Date Due   • Breast Cancer Screening: Mammogram  03/20/2025   • Colorectal Cancer Screening  02/05/2029   • HIV Screening  Addressed   • Hepatitis C Screening  Addressed     Immunizations Due:      Topic Date Due   • DTaP,Tdap,and Td Vaccines (2 - Td or Tdap) 06/11/2023   • Pneumococcal Vaccine: 65+ Years (1 of 1 - PCV) Never done   • COVID-19 Vaccine (4 - 2023-24 season) 09/01/2023      Advance Directives   What are advance directives?  Advance directives are legal documents that state your wishes and plans for medical care. These plans are made ahead of time in case you lose your ability to make decisions for yourself. Advance directives can apply to any medical decision, such as the treatments you want, and if you want to donate organs.   What are the types of advance directives?  There are many types of advance directives, and each state has rules about how to use them. You may choose a combination of any of the following:  Living will:  This is a written record of the treatment you want. You can also choose which treatments you do not want, which to limit, and which to stop at a certain time. This includes surgery, medicine, IV fluid, and tube feedings.   Durable power of  for healthcare (DPAHC):  This is a written record that states who you want to make healthcare choices for you when you are unable to make them for yourself. This person, called a proxy, is usually a family member or a friend. You may choose more than 1 proxy.  Do not resuscitate (DNR) order:  A DNR order is used in case your heart stops beating or you stop breathing. It is a request not to have certain forms of treatment, such as CPR. A DNR order may be included in other types of advance directives.  Medical directive:  This covers the care that you want if you are in a coma, near death, or unable to make decisions for yourself. You can list the treatments you want for each condition. Treatment may include pain medicine, surgery, blood transfusions, dialysis, IV or tube feedings, and a ventilator (breathing machine).  Values history:  This document has questions about your views, beliefs, and how you feel and think about life. This information can help others choose the care that you would choose.  Why are advance directives important?  An advance directive helps you control your care. Although spoken wishes may  be used, it is better to have your wishes written down. Spoken wishes can be misunderstood, or not followed. Treatments may be given even if you do not want them. An advance directive may make it easier for your family to make difficult choices about your care.   Urinary Incontinence   Urinary incontinence (UI)  is when you lose control of your bladder. UI develops because your bladder cannot store or empty urine properly. The 3 most common types of UI are stress incontinence, urge incontinence, or both.  Medicines:   May be given to help strengthen your bladder control. Report any side effects of medication to your healthcare provider.  Do pelvic muscle exercises often:  Your pelvic muscles help you stop urinating. Squeeze these muscles tight for 5 seconds, then relax for 5 seconds. Gradually work up to squeezing for 10 seconds. Do 3 sets of 15 repetitions a day, or as directed. This will help strengthen your pelvic muscles and improve bladder control.  Train your bladder:  Go to the bathroom at set times, such as every 2 hours, even if you do not feel the urge to go. You can also try to hold your urine when you feel the urge to go. For example, hold your urine for 5 minutes when you feel the urge to go. As that becomes easier, hold your urine for 10 minutes.   Self-care:   Keep a UI record.  Write down how often you leak urine and how much you leak. Make a note of what you were doing when you leaked urine.  Drink liquids as directed. You may need to limit the amount of liquid you drink to help control your urine leakage. Do not drink any liquid right before you go to bed. Limit or do not have drinks that contain caffeine or alcohol.   Prevent constipation.  Eat a variety of high-fiber foods. Good examples are high-fiber cereals, beans, vegetables, and whole-grain breads. Walking is the best way to trigger your intestines to have a bowel movement.  Exercise regularly and maintain a healthy weight.  Weight loss and  exercise will decrease pressure on your bladder and help you control your leakage.   Use a catheter as directed  to help empty your bladder. A catheter is a tiny, plastic tube that is put into your bladder to drain your urine.   Go to behavior therapy as directed.  Behavior therapy may be used to help you learn to control your urge to urinate.    Weight Management   Why it is important to manage your weight:  Being overweight increases your risk of health conditions such as heart disease, high blood pressure, type 2 diabetes, and certain types of cancer. It can also increase your risk for osteoarthritis, sleep apnea, and other respiratory problems. Aim for a slow, steady weight loss. Even a small amount of weight loss can lower your risk of health problems.  How to lose weight safely:  A safe and healthy way to lose weight is to eat fewer calories and get regular exercise. You can lose up about 1 pound a week by decreasing the number of calories you eat by 500 calories each day.   Healthy meal plan for weight management:  A healthy meal plan includes a variety of foods, contains fewer calories, and helps you stay healthy. A healthy meal plan includes the following:  Eat whole-grain foods more often.  A healthy meal plan should contain fiber. Fiber is the part of grains, fruits, and vegetables that is not broken down by your body. Whole-grain foods are healthy and provide extra fiber in your diet. Some examples of whole-grain foods are whole-wheat breads and pastas, oatmeal, brown rice, and bulgur.  Eat a variety of vegetables every day.  Include dark, leafy greens such as spinach, kale, don greens, and mustard greens. Eat yellow and orange vegetables such as carrots, sweet potatoes, and winter squash.   Eat a variety of fruits every day.  Choose fresh or canned fruit (canned in its own juice or light syrup) instead of juice. Fruit juice has very little or no fiber.  Eat low-fat dairy foods.  Drink fat-free  "(skim) milk or 1% milk. Eat fat-free yogurt and low-fat cottage cheese. Try low-fat cheeses such as mozzarella and other reduced-fat cheeses.  Choose meat and other protein foods that are low in fat.  Choose beans or other legumes such as split peas or lentils. Choose fish, skinless poultry (chicken or turkey), or lean cuts of red meat (beef or pork). Before you cook meat or poultry, cut off any visible fat.   Use less fat and oil.  Try baking foods instead of frying them. Add less fat, such as margarine, sour cream, regular salad dressing and mayonnaise to foods. Eat fewer high-fat foods. Some examples of high-fat foods include french fries, doughnuts, ice cream, and cakes.  Eat fewer sweets.  Limit foods and drinks that are high in sugar. This includes candy, cookies, regular soda, and sweetened drinks.  Exercise:  Exercise at least 30 minutes per day on most days of the week. Some examples of exercise include walking, biking, dancing, and swimming. You can also fit in more physical activity by taking the stairs instead of the elevator or parking farther away from stores. Ask your healthcare provider about the best exercise plan for you.   Alcohol Use and Your Health    Drinking too much can harm your health.  Excessive alcohol use leads to about 88,000 death in the [place] each year, and shortens the life of those who diet by almost 30 years.  Further, excessive drinking cost the economy $249 billion in 2010.  Most excessive drinkers are not alcohol dependent.    Excessive alcohol use has immediate effects that increase the risk of many harmful health conditions.  These are most often the result of binge drinking.  Over time, excessive alcohol use can lead to the development of chronic diseases and other series health problems.    What is considered a \"drink\"?        Excessive alcohol use includes:  Binge Drinking: For women, 4 or more drinks consumed on one occasion. For men, 5 or more drinks consumed on " one occasion.  Heavy Drinking: For women, 8 or more drinks per week. For men, 15 or more drinks per week  Any alcohol used by pregnant women  Any alcohol used by those under the age of 21 years    If you choose to drink, do so in moderation:  Do not drink at all if you are under the age of 21, or if you are or may be pregnant, or have health problems that could be made worse by drinking.  For women, up to 1 drink per day  For men, up to 2 drinks a day    No one should begin drinking or drink more frequently based on potential health benefits    Short-Term Health Risks:  Injuries: motor vehicle crashes, falls, drownings, burns  Violence: homicide, suicide, sexual assault, intimate partner violence  Alcohol poisoning  Reproductive health: risky sexual behaviors, unintended prengnacy, sexually transmitted diseases, miscarriage, stillbirth, fetal alcohol syndrome    Long-Term Health Risks:  Chronic diseases: high blood pressure, heart disease, stroke, liver disease, digestive problems  Cancers: breast, mouth and throat, liver, colon  Learning and memory problems: dementia, poor school performance  Mental health: depression, anxiety, insomnia  Social problems: lost productivity, family problems, unemployment  Alcohol dependence    For support and more information:  Substance Abuse and Mental Health Services Administration  [address]  [place] [address]  Web Address: http://www.samhsa.gov    Alcoholics Anonymous        Web Address: http://www.aa.org    https://www.cdc.gov/alcohol/fact-sheets/alcohol-use.htm     © Copyright [employer] 2018 Information is for End User's use only and may not be sold, redistributed or otherwise used for commercial purposes. All illustrations and images included in CareNotes® are the copyrighted property of [employer] Inc. or [employer]

## 2024-07-23 DIAGNOSIS — F32.A DEPRESSIVE DISORDER: ICD-10-CM

## 2024-07-23 DIAGNOSIS — E03.9 HYPOTHYROIDISM, UNSPECIFIED TYPE: ICD-10-CM

## 2024-07-23 RX ORDER — ESCITALOPRAM OXALATE 10 MG/1
10 TABLET ORAL DAILY
Qty: 100 TABLET | Refills: 1 | Status: SHIPPED | OUTPATIENT
Start: 2024-07-23

## 2024-07-23 RX ORDER — LEVOTHYROXINE SODIUM 88 UG/1
TABLET ORAL
Qty: 100 TABLET | Refills: 1 | Status: SHIPPED | OUTPATIENT
Start: 2024-07-23

## 2024-09-07 ENCOUNTER — APPOINTMENT (OUTPATIENT)
Dept: LAB | Facility: MEDICAL CENTER | Age: 64
End: 2024-09-07
Payer: COMMERCIAL

## 2024-09-07 DIAGNOSIS — D72.819 LEUKOPENIA, UNSPECIFIED TYPE: ICD-10-CM

## 2024-09-07 DIAGNOSIS — E78.5 HYPERLIPIDEMIA, UNSPECIFIED HYPERLIPIDEMIA TYPE: ICD-10-CM

## 2024-09-07 DIAGNOSIS — F33.0 MILD RECURRENT MAJOR DEPRESSION (HCC): ICD-10-CM

## 2024-09-07 DIAGNOSIS — E03.9 HYPOTHYROIDISM, UNSPECIFIED TYPE: ICD-10-CM

## 2024-09-07 LAB
CHOLEST SERPL-MCNC: 184 MG/DL
ERYTHROCYTE [DISTWIDTH] IN BLOOD BY AUTOMATED COUNT: 13.3 % (ref 11.6–15.1)
HCT VFR BLD AUTO: 44.3 % (ref 36.5–46.1)
HDLC SERPL-MCNC: 66 MG/DL
HGB BLD-MCNC: 14.4 G/DL (ref 12–15.4)
LDLC SERPL CALC-MCNC: 92 MG/DL (ref 0–100)
MCH RBC QN AUTO: 29.8 PG (ref 26.8–34.3)
MCHC RBC AUTO-ENTMCNC: 32.5 G/DL (ref 31.4–37.4)
MCV RBC AUTO: 92 FL (ref 82–98)
PLATELET # BLD AUTO: 332 THOUSANDS/UL (ref 149–390)
PMV BLD AUTO: 9.7 FL (ref 8.9–12.7)
RBC # BLD AUTO: 4.84 MILLION/UL (ref 3.88–5.12)
TRIGL SERPL-MCNC: 132 MG/DL
TSH SERPL DL<=0.05 MIU/L-ACNC: 2.05 UIU/ML (ref 0.45–4.5)
WBC # BLD AUTO: 5.23 THOUSAND/UL (ref 4.31–10.16)

## 2024-09-07 PROCEDURE — 80053 COMPREHEN METABOLIC PANEL: CPT

## 2024-09-07 PROCEDURE — 85027 COMPLETE CBC AUTOMATED: CPT

## 2024-09-07 PROCEDURE — 84443 ASSAY THYROID STIM HORMONE: CPT

## 2024-09-07 PROCEDURE — 36415 COLL VENOUS BLD VENIPUNCTURE: CPT

## 2024-09-07 PROCEDURE — 80061 LIPID PANEL: CPT

## 2024-09-08 LAB
ALBUMIN SERPL BCG-MCNC: 3.9 G/DL (ref 3.5–5)
ALP SERPL-CCNC: 77 U/L (ref 34–104)
ALT SERPL W P-5'-P-CCNC: 17 U/L (ref 7–52)
ANION GAP SERPL CALCULATED.3IONS-SCNC: 12 MMOL/L (ref 4–13)
AST SERPL W P-5'-P-CCNC: 26 U/L (ref 5–45)
BILIRUB SERPL-MCNC: 0.4 MG/DL (ref 0.2–1)
BUN SERPL-MCNC: 8 MG/DL (ref 5–25)
CALCIUM SERPL-MCNC: 9.2 MG/DL (ref 8.4–10.2)
CHLORIDE SERPL-SCNC: 102 MMOL/L (ref 96–108)
CO2 SERPL-SCNC: 24 MMOL/L (ref 21–32)
CREAT SERPL-MCNC: 0.67 MG/DL (ref 0.6–1.3)
GLUCOSE P FAST SERPL-MCNC: 78 MG/DL (ref 65–99)
POTASSIUM SERPL-SCNC: 4.4 MMOL/L (ref 3.5–5.3)
PROT SERPL-MCNC: 7.2 G/DL (ref 6.4–8.4)
SODIUM SERPL-SCNC: 138 MMOL/L (ref 135–147)

## 2024-12-02 DIAGNOSIS — I10 ESSENTIAL HYPERTENSION: Primary | ICD-10-CM

## 2024-12-03 RX ORDER — SPIRONOLACTONE 50 MG/1
50 TABLET, FILM COATED ORAL
Qty: 90 TABLET | Refills: 1 | Status: SHIPPED | OUTPATIENT
Start: 2024-12-03 | End: 2025-03-03

## 2024-12-05 ENCOUNTER — RA CDI HCC (OUTPATIENT)
Dept: OTHER | Facility: HOSPITAL | Age: 64
End: 2024-12-05

## 2024-12-13 ENCOUNTER — OFFICE VISIT (OUTPATIENT)
Dept: FAMILY MEDICINE CLINIC | Facility: CLINIC | Age: 64
End: 2024-12-13
Payer: MEDICARE

## 2024-12-13 VITALS
OXYGEN SATURATION: 98 % | HEART RATE: 87 BPM | WEIGHT: 164.6 LBS | DIASTOLIC BLOOD PRESSURE: 82 MMHG | HEIGHT: 65 IN | TEMPERATURE: 98.3 F | BODY MASS INDEX: 27.42 KG/M2 | SYSTOLIC BLOOD PRESSURE: 140 MMHG

## 2024-12-13 DIAGNOSIS — F33.0 MILD RECURRENT MAJOR DEPRESSION (HCC): ICD-10-CM

## 2024-12-13 DIAGNOSIS — E03.9 HYPOTHYROIDISM, UNSPECIFIED TYPE: ICD-10-CM

## 2024-12-13 DIAGNOSIS — E78.5 HYPERLIPIDEMIA, UNSPECIFIED HYPERLIPIDEMIA TYPE: ICD-10-CM

## 2024-12-13 DIAGNOSIS — Z12.31 ENCOUNTER FOR SCREENING MAMMOGRAM FOR MALIGNANT NEOPLASM OF BREAST: ICD-10-CM

## 2024-12-13 DIAGNOSIS — I10 ESSENTIAL HYPERTENSION: Primary | ICD-10-CM

## 2024-12-13 DIAGNOSIS — D72.819 LEUKOPENIA, UNSPECIFIED TYPE: ICD-10-CM

## 2024-12-13 DIAGNOSIS — Z13.1 SCREENING FOR DIABETES MELLITUS: ICD-10-CM

## 2024-12-13 PROCEDURE — 99214 OFFICE O/P EST MOD 30 MIN: CPT | Performed by: FAMILY MEDICINE

## 2024-12-13 PROCEDURE — G2211 COMPLEX E/M VISIT ADD ON: HCPCS | Performed by: FAMILY MEDICINE

## 2024-12-13 RX ORDER — KETOCONAZOLE 20 MG/ML
SHAMPOO, SUSPENSION TOPICAL
COMMUNITY
Start: 2024-09-05

## 2024-12-13 RX ORDER — FENOFIBRATE 54 MG/1
54 TABLET ORAL DAILY
Qty: 30 TABLET | Refills: 5 | Status: SHIPPED | OUTPATIENT
Start: 2024-12-13

## 2024-12-13 NOTE — PROGRESS NOTES
Name: Radha Pena      : 1960      MRN: [id number]  Encounter Provider: Mio Jimenes DO  Encounter Date: 2024   Encounter department: [facility]  :  Assessment & Plan  Essential hypertension  Blood pressure appears minimally elevated today.  Continue with routine home blood pressure monitor.  Continues well with her current treatment with spironolactone.       Hyperlipidemia, unspecified hyperlipidemia type  Lipid panel appears much improved.  Continue with a strict diet and exercise.  Continues well with current treatment of fenofibrate.       Hypothyroidism, unspecified type  Clinically stable.  Thyroid function testing has returned to normal.  Continue with current dose of levothyroxine.       Mild recurrent major depression (HCC)  Stable.  She denies any recent exacerbations of her depression.  Continue with her current treatment of Lexapro.         Encounter for screening mammogram for malignant neoplasm of breast         Leukopenia, unspecified type         Screening for diabetes mellitus                History of Present Illness     HPI  Patient is a 64-year-old female presents today for follow-up on her chronic conditions.  She has hypertension, dyslipidemia, GERD depression, hypothyroidism, GERD.  She has been taking medications regularly.  She denies adverse reactions with her medications.    Review of Systems   Constitutional:  Negative for activity change, chills, fatigue and fever.   HENT:  Negative for congestion, ear pain, sinus pressure and sore throat.    Eyes:  Negative for redness, itching and visual disturbance.   Respiratory:  Negative for cough and shortness of breath.    Cardiovascular:  Negative for chest pain and palpitations.   Gastrointestinal:  Negative for abdominal pain, diarrhea and nausea.   Endocrine: Negative for cold intolerance and heat intolerance.   Genitourinary:  Negative for dysuria, flank pain and frequency.   Musculoskeletal:   "Negative for arthralgias, back pain, gait problem and myalgias.   Skin:  Negative for color change.   Allergic/Immunologic: Negative for environmental allergies.   Neurological:  Negative for dizziness, numbness and headaches.   Psychiatric/Behavioral:  Negative for behavioral problems and sleep disturbance.        Objective   /82 (BP Location: Left arm, Patient Position: Sitting, Cuff Size: Large)   Pulse 87   Temp 98.3 °F (36.8 °C) (Temporal)   Ht 5' 4.5\" (1.638 m)   Wt 74.7 kg (164 lb 9.6 oz)   LMP  (LMP Unknown)   SpO2 98%   BMI 27.82 kg/m²      Physical Exam  Vitals reviewed.   Constitutional:       General: She is not in acute distress.     Appearance: Normal appearance. She is well-developed.   HENT:      Head: Normocephalic and atraumatic.      Right Ear: Tympanic membrane, ear canal and external ear normal. There is no impacted cerumen.      Left Ear: Tympanic membrane, ear canal and external ear normal. There is no impacted cerumen.      Nose: Nose normal. No congestion or rhinorrhea.      Mouth/Throat:      Mouth: Mucous membranes are moist.      Pharynx: No oropharyngeal exudate or posterior oropharyngeal erythema.   Eyes:      General: No scleral icterus.        Right eye: No discharge.         Left eye: No discharge.      Extraocular Movements: Extraocular movements intact.      Conjunctiva/sclera: Conjunctivae normal.      Pupils: Pupils are equal, round, and reactive to light.   Neck:      Trachea: No tracheal deviation.   Cardiovascular:      Rate and Rhythm: Normal rate and regular rhythm.      Pulses: Normal pulses.           Dorsalis pedis pulses are 2+ on the right side and 2+ on the left side.        Posterior tibial pulses are 2+ on the right side and 2+ on the left side.      Heart sounds: Normal heart sounds. No murmur heard.     No friction rub. No gallop.   Pulmonary:      Effort: Pulmonary effort is normal. No respiratory distress.      Breath sounds: Normal breath sounds. No " wheezing, rhonchi or rales.   Abdominal:      General: Bowel sounds are normal. There is no distension.      Palpations: Abdomen is soft.      Tenderness: There is no abdominal tenderness. There is no guarding or rebound.   Musculoskeletal:         General: Normal range of motion.      Cervical back: Normal range of motion and neck supple.      Right lower leg: No edema.      Left lower leg: No edema.   Lymphadenopathy:      Head:      Right side of head: No submental or submandibular adenopathy.      Left side of head: No submental or submandibular adenopathy.      Cervical: No cervical adenopathy.      Right cervical: No superficial, deep or posterior cervical adenopathy.     Left cervical: No superficial, deep or posterior cervical adenopathy.   Skin:     General: Skin is warm and dry.      Findings: No erythema.   Neurological:      General: No focal deficit present.      Mental Status: She is alert and oriented to person, place, and time.      Cranial Nerves: No cranial nerve deficit.      Sensory: Sensation is intact. No sensory deficit.      Motor: Motor function is intact.   Psychiatric:         Attention and Perception: Attention and perception normal.         Mood and Affect: Mood is not anxious or depressed.         Speech: Speech normal.         Behavior: Behavior normal.         Thought Content: Thought content normal.         Judgment: Judgment normal.

## 2024-12-13 NOTE — ASSESSMENT & PLAN NOTE
Clinically stable.  Thyroid function testing has returned to normal.  Continue with current dose of levothyroxine.

## 2024-12-13 NOTE — ASSESSMENT & PLAN NOTE
Lipid panel appears much improved.  Continue with a strict diet and exercise.  Continues well with current treatment of fenofibrate.

## 2024-12-13 NOTE — ASSESSMENT & PLAN NOTE
Stable.  She denies any recent exacerbations of her depression.  Continue with her current treatment of Lexapro.

## 2025-01-20 DIAGNOSIS — E03.9 HYPOTHYROIDISM, UNSPECIFIED TYPE: ICD-10-CM

## 2025-01-20 DIAGNOSIS — F32.A DEPRESSIVE DISORDER: ICD-10-CM

## 2025-01-21 RX ORDER — ESCITALOPRAM OXALATE 10 MG/1
10 TABLET ORAL DAILY
Qty: 90 TABLET | Refills: 1 | Status: SHIPPED | OUTPATIENT
Start: 2025-01-21 | End: 2025-01-22 | Stop reason: SDUPTHER

## 2025-01-21 RX ORDER — LEVOTHYROXINE SODIUM 88 UG/1
88 TABLET ORAL DAILY
Qty: 90 TABLET | Refills: 1 | Status: SHIPPED | OUTPATIENT
Start: 2025-01-21 | End: 2025-01-22 | Stop reason: SDUPTHER

## 2025-01-22 DIAGNOSIS — E03.9 HYPOTHYROIDISM, UNSPECIFIED TYPE: ICD-10-CM

## 2025-01-22 DIAGNOSIS — F32.A DEPRESSIVE DISORDER: ICD-10-CM

## 2025-01-22 RX ORDER — ESCITALOPRAM OXALATE 10 MG/1
10 TABLET ORAL DAILY
Qty: 90 TABLET | Refills: 1 | Status: SHIPPED | OUTPATIENT
Start: 2025-01-22

## 2025-01-22 RX ORDER — LEVOTHYROXINE SODIUM 88 UG/1
88 TABLET ORAL DAILY
Qty: 90 TABLET | Refills: 1 | Status: SHIPPED | OUTPATIENT
Start: 2025-01-22

## 2025-01-22 NOTE — TELEPHONE ENCOUNTER
Reason for call:   [x] Refill   [] Prior Auth  [] Other:     Office:   [x] PCP/Provider - Mio Jimenes DO   [] Specialty/Provider -     Medication:   levothyroxine 88 mcg tablet   escitalopram (LEXAPRO) 10 mg tablet     Dose/Frequency:     88 mcg, Oral, Daily       10 mg, Oral, Daily     Quantity:   90  90    Pharmacy: [facility] - [place], [place] - [address]     Does the patient have enough for 3 days?   [] Yes   [x] No - Send as HP to POD

## 2025-03-25 DIAGNOSIS — F32.A DEPRESSIVE DISORDER: ICD-10-CM

## 2025-03-25 DIAGNOSIS — E78.5 HYPERLIPIDEMIA, UNSPECIFIED HYPERLIPIDEMIA TYPE: ICD-10-CM

## 2025-03-25 DIAGNOSIS — E03.9 HYPOTHYROIDISM, UNSPECIFIED TYPE: ICD-10-CM

## 2025-03-25 DIAGNOSIS — I10 ESSENTIAL HYPERTENSION: ICD-10-CM

## 2025-03-26 ENCOUNTER — HOSPITAL ENCOUNTER (OUTPATIENT)
Dept: MAMMOGRAPHY | Facility: MEDICAL CENTER | Age: 65
Discharge: HOME | End: 2025-03-26
Payer: COMMERCIAL

## 2025-03-26 VITALS — BODY MASS INDEX: 27.16 KG/M2 | HEIGHT: 65 IN | WEIGHT: 163 LBS

## 2025-03-26 DIAGNOSIS — Z12.31 ENCOUNTER FOR SCREENING MAMMOGRAM FOR MALIGNANT NEOPLASM OF BREAST: ICD-10-CM

## 2025-03-26 PROCEDURE — 77067 SCR MAMMO BI INCL CAD: CPT

## 2025-03-26 PROCEDURE — 77063 BREAST TOMOSYNTHESIS BI: CPT

## 2025-03-26 RX ORDER — ESCITALOPRAM OXALATE 10 MG/1
10 TABLET ORAL DAILY
Qty: 90 TABLET | Refills: 1 | Status: SHIPPED | OUTPATIENT
Start: 2025-03-26

## 2025-03-26 RX ORDER — SPIRONOLACTONE 50 MG/1
50 TABLET, FILM COATED ORAL
Qty: 90 TABLET | Refills: 1 | Status: SHIPPED | OUTPATIENT
Start: 2025-03-26 | End: 2025-09-22

## 2025-03-26 RX ORDER — LEVOTHYROXINE SODIUM 88 UG/1
88 TABLET ORAL DAILY
Qty: 90 TABLET | Refills: 1 | Status: SHIPPED | OUTPATIENT
Start: 2025-03-26

## 2025-03-26 RX ORDER — FENOFIBRATE 54 MG/1
54 TABLET ORAL DAILY
Qty: 30 TABLET | Refills: 5 | Status: SHIPPED | OUTPATIENT
Start: 2025-03-26

## 2025-03-28 ENCOUNTER — RESULTS FOLLOW-UP (OUTPATIENT)
Dept: FAMILY MEDICINE CLINIC | Facility: CLINIC | Age: 65
End: 2025-03-28

## 2025-04-15 DIAGNOSIS — E78.5 HYPERLIPIDEMIA, UNSPECIFIED HYPERLIPIDEMIA TYPE: ICD-10-CM

## 2025-04-15 RX ORDER — FENOFIBRATE 54 MG/1
54 TABLET ORAL DAILY
Qty: 30 TABLET | Refills: 5 | Status: SHIPPED | OUTPATIENT
Start: 2025-04-15

## 2025-05-27 ENCOUNTER — TELEPHONE (OUTPATIENT)
Age: 65
End: 2025-05-27

## 2025-05-27 NOTE — TELEPHONE ENCOUNTER
Patient called to verify that she had labs to complete prior to appointment on 6/13/25. I confirmed she did. Patient advised she will get them done at lab in [place].

## 2025-06-09 ENCOUNTER — RA CDI HCC (OUTPATIENT)
Dept: OTHER | Facility: HOSPITAL | Age: 65
End: 2025-06-09

## 2025-06-19 ENCOUNTER — APPOINTMENT (OUTPATIENT)
Dept: LAB | Facility: CLINIC | Age: 65
End: 2025-06-19
Payer: COMMERCIAL

## 2025-06-19 DIAGNOSIS — E03.9 HYPOTHYROIDISM, UNSPECIFIED TYPE: ICD-10-CM

## 2025-06-19 DIAGNOSIS — E78.5 HYPERLIPIDEMIA, UNSPECIFIED HYPERLIPIDEMIA TYPE: ICD-10-CM

## 2025-06-19 DIAGNOSIS — D72.819 LEUKOPENIA, UNSPECIFIED TYPE: ICD-10-CM

## 2025-06-19 DIAGNOSIS — Z13.1 SCREENING FOR DIABETES MELLITUS: ICD-10-CM

## 2025-06-19 LAB
ALBUMIN SERPL BCG-MCNC: 4 G/DL (ref 3.5–5)
ALP SERPL-CCNC: 89 U/L (ref 34–104)
ALT SERPL W P-5'-P-CCNC: 23 U/L (ref 7–52)
ANION GAP SERPL CALCULATED.3IONS-SCNC: 11 MMOL/L (ref 4–13)
AST SERPL W P-5'-P-CCNC: 37 U/L (ref 13–39)
BILIRUB SERPL-MCNC: 0.35 MG/DL (ref 0.2–1)
BUN SERPL-MCNC: 6 MG/DL (ref 5–25)
CALCIUM SERPL-MCNC: 9.3 MG/DL (ref 8.4–10.2)
CHLORIDE SERPL-SCNC: 101 MMOL/L (ref 96–108)
CHOLEST SERPL-MCNC: 216 MG/DL (ref ?–200)
CO2 SERPL-SCNC: 24 MMOL/L (ref 21–32)
CREAT SERPL-MCNC: 0.65 MG/DL (ref 0.6–1.3)
ERYTHROCYTE [DISTWIDTH] IN BLOOD BY AUTOMATED COUNT: 14.8 % (ref 11.6–15.1)
EST. AVERAGE GLUCOSE BLD GHB EST-MCNC: 108 MG/DL
GFR SERPL CREATININE-BSD FRML MDRD: 92 ML/MIN/1.73SQ M
GLUCOSE P FAST SERPL-MCNC: 76 MG/DL (ref 65–99)
HBA1C MFR BLD: 5.4 %
HCT VFR BLD AUTO: 41 % (ref 34.8–46.1)
HDLC SERPL-MCNC: 104 MG/DL
HGB BLD-MCNC: 13.5 G/DL (ref 11.5–15.4)
LDLC SERPL CALC-MCNC: 90 MG/DL (ref 0–100)
MCH RBC QN AUTO: 28.3 PG (ref 26.8–34.3)
MCHC RBC AUTO-ENTMCNC: 32.9 G/DL (ref 31.4–37.4)
MCV RBC AUTO: 86 FL (ref 82–98)
PLATELET # BLD AUTO: 336 THOUSANDS/UL (ref 149–390)
PMV BLD AUTO: 9.8 FL (ref 8.9–12.7)
POTASSIUM SERPL-SCNC: 3.8 MMOL/L (ref 3.5–5.3)
PROT SERPL-MCNC: 7.2 G/DL (ref 6.4–8.4)
RBC # BLD AUTO: 4.77 MILLION/UL (ref 3.81–5.12)
SODIUM SERPL-SCNC: 136 MMOL/L (ref 135–147)
TRIGL SERPL-MCNC: 111 MG/DL (ref ?–150)
TSH SERPL DL<=0.05 MIU/L-ACNC: 1.15 UIU/ML (ref 0.45–4.5)
WBC # BLD AUTO: 3.98 THOUSAND/UL (ref 4.31–10.16)

## 2025-06-19 PROCEDURE — 84443 ASSAY THYROID STIM HORMONE: CPT

## 2025-06-19 PROCEDURE — 36415 COLL VENOUS BLD VENIPUNCTURE: CPT

## 2025-06-19 PROCEDURE — 80053 COMPREHEN METABOLIC PANEL: CPT

## 2025-06-19 PROCEDURE — 85027 COMPLETE CBC AUTOMATED: CPT

## 2025-06-19 PROCEDURE — 83036 HEMOGLOBIN GLYCOSYLATED A1C: CPT

## 2025-06-19 PROCEDURE — 80061 LIPID PANEL: CPT

## 2025-06-20 ENCOUNTER — OFFICE VISIT (OUTPATIENT)
Dept: FAMILY MEDICINE CLINIC | Facility: CLINIC | Age: 65
End: 2025-06-20
Payer: COMMERCIAL

## 2025-06-20 VITALS
BODY MASS INDEX: 26.99 KG/M2 | DIASTOLIC BLOOD PRESSURE: 72 MMHG | OXYGEN SATURATION: 97 % | SYSTOLIC BLOOD PRESSURE: 112 MMHG | TEMPERATURE: 97.4 F | HEART RATE: 81 BPM | WEIGHT: 162 LBS | HEIGHT: 65 IN

## 2025-06-20 DIAGNOSIS — Z23 ENCOUNTER FOR IMMUNIZATION: ICD-10-CM

## 2025-06-20 DIAGNOSIS — Z00.00 MEDICARE ANNUAL WELLNESS VISIT, SUBSEQUENT: Primary | ICD-10-CM

## 2025-06-20 DIAGNOSIS — F33.0 MILD RECURRENT MAJOR DEPRESSION (HCC): ICD-10-CM

## 2025-06-20 DIAGNOSIS — E03.9 HYPOTHYROIDISM, UNSPECIFIED TYPE: ICD-10-CM

## 2025-06-20 DIAGNOSIS — E78.5 HYPERLIPIDEMIA, UNSPECIFIED HYPERLIPIDEMIA TYPE: ICD-10-CM

## 2025-06-20 DIAGNOSIS — I10 ESSENTIAL HYPERTENSION: ICD-10-CM

## 2025-06-20 DIAGNOSIS — Z78.0 POSTMENOPAUSAL STATE: ICD-10-CM

## 2025-06-20 PROCEDURE — 99214 OFFICE O/P EST MOD 30 MIN: CPT | Performed by: FAMILY MEDICINE

## 2025-06-20 PROCEDURE — G0009 ADMIN PNEUMOCOCCAL VACCINE: HCPCS

## 2025-06-20 PROCEDURE — G0439 PPPS, SUBSEQ VISIT: HCPCS | Performed by: FAMILY MEDICINE

## 2025-06-20 PROCEDURE — G2211 COMPLEX E/M VISIT ADD ON: HCPCS | Performed by: FAMILY MEDICINE

## 2025-06-20 PROCEDURE — 90677 PCV20 VACCINE IM: CPT

## 2025-06-20 NOTE — PATIENT INSTRUCTIONS
Medicare Preventive Visit Patient Instructions  Thank you for completing your Welcome to Medicare Visit or Medicare Annual Wellness Visit today. Your next wellness visit will be due in one year (6/21/2026).  The screening/preventive services that you may require over the next 5-10 years are detailed below. Some tests may not apply to you based off risk factors and/or age. Screening tests ordered at today's visit but not completed yet may show as past due. Also, please note that scanned in results may not display below.  Preventive Screenings:  Service Recommendations Previous Testing/Comments   Colorectal Cancer Screening  * Colonoscopy    * Fecal Occult Blood Test (FOBT)/Fecal Immunochemical Test (FIT)  * Fecal DNA/Cologuard Test  * Flexible Sigmoidoscopy Age: 45-75 years old   Colonoscopy: every 10 years (may be performed more frequently if at higher risk)  OR  FOBT/FIT: every 1 year  OR  Cologuard: every 3 years  OR  Sigmoidoscopy: every 5 years  Screening may be recommended earlier than age 45 if at higher risk for colorectal cancer. Also, an individualized decision between you and your healthcare provider will decide whether screening between the ages of 76-85 would be appropriate. Colonoscopy: 02/05/2019  FOBT/FIT: Not on file  Cologuard: Not on file  Sigmoidoscopy: Not on file    Screening Current     Breast Cancer Screening Age: 40+ years old  Frequency: every 1-2 years  Not required if history of left and right mastectomy Mammogram: 03/26/2025    Screening Current   Cervical Cancer Screening Between the ages of 21-29, pap smear recommended once every 3 years.   Between the ages of 30-65, can perform pap smear with HPV co-testing every 5 years.   Recommendations may differ for women with a history of total hysterectomy, cervical cancer, or abnormal pap smears in past. Pap Smear: 01/31/2017    Screening Not Indicated   Hepatitis C Screening Once for adults born between 1945 and 1965  More frequently in  patients at high risk for Hepatitis C Hep C Antibody: Not on file    Screening Current   Diabetes Screening 1-2 times per year if you're at risk for diabetes or have pre-diabetes Fasting glucose: 76 mg/dL (6/19/2025)  A1C: 5.4 % (6/19/2025)  Screening Current   Cholesterol Screening Once every 5 years if you don't have a lipid disorder. May order more often based on risk factors. Lipid panel: 06/19/2025    Screening Not Indicated  History Lipid Disorder     Other Preventive Screenings Covered by Medicare:  Abdominal Aortic Aneurysm (AAA) Screening: covered once if your at risk. You're considered to be at risk if you have a family history of AAA.  Lung Cancer Screening: covers low dose CT scan once per year if you meet all of the following conditions: (1) Age 55-77; (2) No signs or symptoms of lung cancer; (3) Current smoker or have quit smoking within the last 15 years; (4) You have a tobacco smoking history of at least 20 pack years (packs per day multiplied by number of years you smoked); (5) You get a written order from a healthcare provider.  Glaucoma Screening: covered annually if you're considered high risk: (1) You have diabetes OR (2) Family history of glaucoma OR (3)  aged 50 and older OR (4)  American aged 65 and older  Osteoporosis Screening: covered every 2 years if you meet one of the following conditions: (1) You're estrogen deficient and at risk for osteoporosis based off medical history and other findings; (2) Have a vertebral abnormality; (3) On glucocorticoid therapy for more than 3 months; (4) Have primary hyperparathyroidism; (5) On osteoporosis medications and need to assess response to drug therapy.   Last bone density test (DXA Scan): 09/28/2023.  HIV Screening: covered annually if you're between the age of 15-65. Also covered annually if you are younger than 15 and older than 65 with risk factors for HIV infection. For pregnant patients, it is covered up to 3 times per  pregnancy.    Immunizations:  Immunization Recommendations   Influenza Vaccine Annual influenza vaccination during flu season is recommended for all persons aged >= 6 months who do not have contraindications   Pneumococcal Vaccine   * Pneumococcal conjugate vaccine = PCV13 (Prevnar 13), PCV15 (Vaxneuvance), PCV20 (Prevnar 20)  * Pneumococcal polysaccharide vaccine = PPSV23 (Pneumovax) Adults 19-63 yo with certain risk factors or if 65+ yo  If never received any pneumonia vaccine: recommend Prevnar 20 (PCV20)  Give PCV20 if previously received 1 dose of PCV13 or PPSV23   Hepatitis B Vaccine 3 dose series if at intermediate or high risk (ex: diabetes, end stage renal disease, liver disease)   Respiratory syncytial virus (RSV) Vaccine - COVERED BY MEDICARE PART D  * RSVPreF3 (Arexvy) CDC recommends that adults 60 years of age and older may receive a single dose of RSV vaccine using shared clinical decision-making (SCDM)   Tetanus (Td) Vaccine - COST NOT COVERED BY MEDICARE PART B Following completion of primary series, a booster dose should be given every 10 years to maintain immunity against tetanus. Td may also be given as tetanus wound prophylaxis.   Tdap Vaccine - COST NOT COVERED BY MEDICARE PART B Recommended at least once for all adults. For pregnant patients, recommended with each pregnancy.   Shingles Vaccine (Shingrix) - COST NOT COVERED BY MEDICARE PART B  2 shot series recommended in those 19 years and older who have or will have weakened immune systems or those 50 years and older     Health Maintenance Due:      Topic Date Due   • Breast Cancer Screening: Mammogram  03/26/2026   • Colorectal Cancer Screening  02/05/2029   • Hepatitis C Screening  Addressed     Immunizations Due:      Topic Date Due   • Pneumococcal Vaccine: 50+ Years (1 of 1 - PCV) Never done   • COVID-19 Vaccine (4 - 2024-25 season) 09/01/2024   • Influenza Vaccine (Season Ended) 09/01/2025     Advance Directives   What are advance  directives?  Advance directives are legal documents that state your wishes and plans for medical care. These plans are made ahead of time in case you lose your ability to make decisions for yourself. Advance directives can apply to any medical decision, such as the treatments you want, and if you want to donate organs.   What are the types of advance directives?  There are many types of advance directives, and each state has rules about how to use them. You may choose a combination of any of the following:  Living will:  This is a written record of the treatment you want. You can also choose which treatments you do not want, which to limit, and which to stop at a certain time. This includes surgery, medicine, IV fluid, and tube feedings.   Durable power of  for healthcare (DPAHC):  This is a written record that states who you want to make healthcare choices for you when you are unable to make them for yourself. This person, called a proxy, is usually a family member or a friend. You may choose more than 1 proxy.  Do not resuscitate (DNR) order:  A DNR order is used in case your heart stops beating or you stop breathing. It is a request not to have certain forms of treatment, such as CPR. A DNR order may be included in other types of advance directives.  Medical directive:  This covers the care that you want if you are in a coma, near death, or unable to make decisions for yourself. You can list the treatments you want for each condition. Treatment may include pain medicine, surgery, blood transfusions, dialysis, IV or tube feedings, and a ventilator (breathing machine).  Values history:  This document has questions about your views, beliefs, and how you feel and think about life. This information can help others choose the care that you would choose.  Why are advance directives important?  An advance directive helps you control your care. Although spoken wishes may be used, it is better to have your wishes  written down. Spoken wishes can be misunderstood, or not followed. Treatments may be given even if you do not want them. An advance directive may make it easier for your family to make difficult choices about your care.   Fall Prevention    Fall prevention  includes ways to make your home and other areas safer. It also includes ways you can move more carefully to prevent a fall. Health conditions that cause changes in your blood pressure, vision, or muscle strength and coordination may increase your risk for falls. Medicines may also increase your risk for falls if they make you dizzy, weak, or sleepy.   Fall prevention tips:   Stand or sit up slowly.    Use assistive devices as directed.    Wear shoes that fit well and have soles that .    Wear a personal alarm.    Stay active.    Manage your medical conditions.    Home Safety Tips:  Add items to prevent falls in the bathroom.    Keep paths clear.    Install bright lights in your home.    Keep items you use often on shelves within reach.    Paint or place reflective tape on the edges of your stairs.    Weight Management   Why it is important to manage your weight:  Being overweight increases your risk of health conditions such as heart disease, high blood pressure, type 2 diabetes, and certain types of cancer. It can also increase your risk for osteoarthritis, sleep apnea, and other respiratory problems. Aim for a slow, steady weight loss. Even a small amount of weight loss can lower your risk of health problems.  How to lose weight safely:  A safe and healthy way to lose weight is to eat fewer calories and get regular exercise. You can lose up about 1 pound a week by decreasing the number of calories you eat by 500 calories each day.   Healthy meal plan for weight management:  A healthy meal plan includes a variety of foods, contains fewer calories, and helps you stay healthy. A healthy meal plan includes the following:  Eat whole-grain foods more often.  A  healthy meal plan should contain fiber. Fiber is the part of grains, fruits, and vegetables that is not broken down by your body. Whole-grain foods are healthy and provide extra fiber in your diet. Some examples of whole-grain foods are whole-wheat breads and pastas, oatmeal, brown rice, and bulgur.  Eat a variety of vegetables every day.  Include dark, leafy greens such as spinach, kale, don greens, and mustard greens. Eat yellow and orange vegetables such as carrots, sweet potatoes, and winter squash.   Eat a variety of fruits every day.  Choose fresh or canned fruit (canned in its own juice or light syrup) instead of juice. Fruit juice has very little or no fiber.  Eat low-fat dairy foods.  Drink fat-free (skim) milk or 1% milk. Eat fat-free yogurt and low-fat cottage cheese. Try low-fat cheeses such as mozzarella and other reduced-fat cheeses.  Choose meat and other protein foods that are low in fat.  Choose beans or other legumes such as split peas or lentils. Choose fish, skinless poultry (chicken or turkey), or lean cuts of red meat (beef or pork). Before you cook meat or poultry, cut off any visible fat.   Use less fat and oil.  Try baking foods instead of frying them. Add less fat, such as margarine, sour cream, regular salad dressing and mayonnaise to foods. Eat fewer high-fat foods. Some examples of high-fat foods include french fries, doughnuts, ice cream, and cakes.  Eat fewer sweets.  Limit foods and drinks that are high in sugar. This includes candy, cookies, regular soda, and sweetened drinks.  Exercise:  Exercise at least 30 minutes per day on most days of the week. Some examples of exercise include walking, biking, dancing, and swimming. You can also fit in more physical activity by taking the stairs instead of the elevator or parking farther away from stores. Ask your healthcare provider about the best exercise plan for you.   Narcotic (Opioid) Safety    Use narcotics safely:  Take prescribed  narcotics exactly as directed  Do not give narcotics to others or take narcotics that belong to someone else  Do not mix narcotics without medicines or alcohol  Do not drive or operate heavy machinery after you take the narcotic  Monitor for side effects and notify your healthcare provider if you experienced side effects such as nausea, sleepiness, itching, or trouble thinking clearly.    Manage constipation:    Constipation is the most common side effect of narcotic medicine. Constipation is when you have hard, dry bowel movements, or you go longer than usual between bowel movements. Tell your healthcare provider about all changes in your bowel movements while you are taking narcotics. He or she may recommend laxative medicine to help you have a bowel movement. He or she may also change the kind of narcotic you are taking, or change when you take it. The following are more ways you can prevent or relieve constipation:    Drink liquids as directed.  You may need to drink extra liquids to help soften and move your bowels. Ask how much liquid to drink each day and which liquids are best for you.  Eat high-fiber foods.  This may help decrease constipation by adding bulk to your bowel movements. High-fiber foods include fruits, vegetables, whole-grain breads and cereals, and beans. Your healthcare provider or dietitian can help you create a high-fiber meal plan. Your provider may also recommend a fiber supplement if you cannot get enough fiber from food.  Exercise regularly.  Regular physical activity can help stimulate your intestines. Walking is a good exercise to prevent or relieve constipation. Ask which exercises are best for you.  Schedule a time each day to have a bowel movement.  This may help train your body to have regular bowel movements. Bend forward while you are on the toilet to help move the bowel movement out. Sit on the toilet for at least 10 minutes, even if you do not have a bowel movement.    Store  narcotics safely:   Store narcotics where others cannot easily get them.  Keep them in a locked cabinet or secure area. Do not  keep them in a purse or other bag you carry with you. A person may be looking for something else and find the narcotics.  Make sure narcotics are stored out of the reach of children.  A child can easily overdose on narcotics. Narcotics may look like candy to a small child.    The best way to dispose of narcotics:      The laws vary by country and area. In the [place], the best way is to return the narcotics through a take-back program. This program is offered by the [place] Drug Enforcement Agency (DILMA). The following are options for using the program:  Take the narcotics to a DILMA collection site.  The site is often a law enforcement center. Call your local law enforcement center for scheduled take-back days in your area. You will be given information on where to go if the collection site is in a different location.  Take the narcotics to an approved pharmacy or hospital.  A pharmacy or hospital may be set up as a collection site. You will need to ask if it is a DILMA collection site if you were not directed there. A pharmacy or doctor's office may not be able to take back narcotics unless it is a DILMA site.  Use a mail-back system.  This means you are given containers to put the narcotics into. You will then mail them in the containers.  Use a take-back drop box.  This is a place to leave the narcotics at any time. People and animals will not be able to get into the box. Your local law enforcement agency can tell you where to find a drop box in your area.    Other ways to manage pain:   Ask your healthcare provider about non-narcotic medicines to control pain.  Nonprescription medicines include NSAIDs (such as ibuprofen) and acetaminophen. Prescription medicines include muscle relaxers, antidepressants, and steroids.  Pain may be managed without any medicines.  Some ways to relieve pain  include massage, aromatherapy, or meditation. Physical or occupational therapy may also help.    For more information:   Drug Enforcement Administration  [address] [place] [address]  Phone: 4- 373 - 458-9742  Web Address: https://www.deadiversion.[email or web address].gov/drug_disposal/    [place] Food and Drug Administration  [address] [place] [address]  Phone: 3- 510 - 180-9294  Web Address: http://www.fda.gov   © Copyright [employer] 2018 Information is for End User's use only and may not be sold, redistributed or otherwise used for commercial purposes. All illustrations and images included in CareNotes® are the copyrighted property of [employer] or [employer]

## 2025-06-20 NOTE — PROGRESS NOTES
Name: Radha Pena      : 1960      MRN: [id number]  Encounter Provider: Mio Jimenes DO  Encounter Date: 2025   Encounter department: [facility]  :  Assessment & Plan  Essential hypertension  Pressure appears stable today.  Continue with routine home monitoring as well as her current treatment with spironolactone.       Hyperlipidemia, unspecified hyperlipidemia type  Stable.  Continue with a strict low-fat/low-cholesterol diet as well as current treatment with fenofibrate.       Hypothyroidism, unspecified type  Stable.  Continue with current treatment with levothyroxine.       Mild recurrent major depression (HCC)  Mood appears stable.  Continue with current treatment of Lexapro.  Follow-up with patient in 6 months.         Postmenopausal state    Orders:    DXA bone density spine hip and pelvis; Future    Encounter for immunization    Orders:    Pneumococcal Conjugate Vaccine 20-valent (Pcv20)    Medicare annual wellness visit, subsequent            Preventive health issues were discussed with patient, and age appropriate screening tests were ordered as noted in patient's After Visit Summary. Personalized health advice and appropriate referrals for health education or preventive services given if needed, as noted in patient's After Visit Summary.    History of Present Illness     HPI   Patient is a 64-year-old female presents today for a follow-up on her chronic conditions.  She is hypertension, dyslipidemia, hypothyroidism, major depression.  She has been taking her medications regularly.  She denies adverse reactions with her medications.  Patient Care Team:  Mio Jimenes DO as PCP - General  Mio Jimenes DO as PCP - [facility]CP-[facility]  DO Shayna Bruce DO Marian McDonald, MD as Endoscopist    Review of Systems   Constitutional:  Negative for activity change, chills, fatigue and fever.   HENT:  Negative for  congestion, ear pain, sinus pressure and sore throat.    Eyes:  Negative for redness, itching and visual disturbance.   Respiratory:  Negative for cough and shortness of breath.    Cardiovascular:  Negative for chest pain and palpitations.   Gastrointestinal:  Negative for abdominal pain, diarrhea and nausea.   Endocrine: Negative for cold intolerance and heat intolerance.   Genitourinary:  Negative for dysuria, flank pain and frequency.   Musculoskeletal:  Negative for arthralgias, back pain, gait problem and myalgias.   Skin:  Negative for color change.   Allergic/Immunologic: Negative for environmental allergies.   Neurological:  Negative for dizziness, numbness and headaches.   Psychiatric/Behavioral:  Negative for behavioral problems and sleep disturbance.      Medical History Reviewed by provider this encounter:       Annual Wellness Visit Questionnaire   Radha is here for her Subsequent Wellness visit. Last Medicare Wellness visit information reviewed, patient interviewed, no change since last AWV.     Health Risk Assessment:   Patient rates overall health as good. Patient feels that their physical health rating is same. Patient is satisfied with their life. Eyesight was rated as same. Hearing was rated as same. Patient feels that their emotional and mental health rating is same. Patients states they are sometimes angry. Patient states they are never, rarely unusually tired/fatigued. Pain experienced in the last 7 days has been none. Patient states that she has experienced no weight loss or gain in last 6 months.     Depression Screening:   PHQ-9 Score: 2      Fall Risk Screening:   In the past year, patient has experienced: history of falling in past year    Number of falls: 1  Injured during fall?: Yes    Feels unsteady when standing or walking?: No    Worried about falling?: No      Urinary Incontinence Screening:   Patient has not leaked urine accidently in the last six months.     Home Safety:  Patient  does not have trouble with stairs inside or outside of their home. Patient has working smoke alarms and has working carbon monoxide detector. Home safety hazards include: none.     Nutrition:   Current diet is Regular.     Medications:   Patient is currently taking over-the-counter supplements. OTC medications include: see medication list. Patient is able to manage medications.     Activities of Daily Living (ADLs)/Instrumental Activities of Daily Living (IADLs):   Walk and transfer into and out of bed and chair?: Yes  Dress and groom yourself?: Yes    Bathe or shower yourself?: Yes    Feed yourself? Yes  Do your laundry/housekeeping?: Yes  Manage your money, pay your bills and track your expenses?: Yes  Make your own meals?: Yes    Do your own shopping?: Yes    Previous Hospitalizations:   Any hospitalizations or ED visits within the last 12 months?: No      Advance Care Planning:   Living will: Yes    Durable POA for healthcare: Yes    Advanced directive: Yes    Advanced directive counseling given: Yes    ACP document given: Yes    Patient declined ACP directive: No    End of Life Decisions reviewed with patient: Yes    Provider agrees with end of life decisions: Yes      Cognitive Screening:   Provider or family/friend/caregiver concerned regarding cognition?: No    Preventive Screenings      Cardiovascular Screening:    General: Screening Not Indicated, History Lipid Disorder, Risks and Benefits Discussed and Screening Current      Diabetes Screening:     General: Screening Current and Risks and Benefits Discussed      Colorectal Cancer Screening:     General: Screening Current      Breast Cancer Screening:     General: Screening Current and Risks and Benefits Discussed      Cervical Cancer Screening:    General: Screening Not Indicated and Risks and Benefits Discussed      Osteoporosis Screening:    General: Risks and Benefits Discussed and Screening Current      Abdominal Aortic Aneurysm (AAA) Screening:        " General: Risks and Benefits Discussed and Screening Not Indicated      Lung Cancer Screening:     General: Screening Not Indicated and Risks and Benefits Discussed      Hepatitis C Screening:    General: Screening Current and Risks and Benefits Discussed    Immunizations:  - Immunizations due: Zoster (Shingrix)    Screening, Brief Intervention, and Referral to Treatment (SBIRT)     Screening  Typical number of drinks in a day: 0  Typical number of drinks in a week: 0  Interpretation: Low risk drinking behavior.    AUDIT-C Screenin) How often did you have a drink containing alcohol in the past year? never  2) How many drinks did you have on a typical day when you were drinking in the past year? 0  3) How often did you have 6 or more drinks on one occasion in the past year? never    AUDIT-C Score: 0  Interpretation: Score 0-2 (female): Negative screen for alcohol misuse    Review of Current Opioid Use    Opioid Risk Tool (ORT) Interpretation: Complete Opioid Risk Tool (ORT)    Social Drivers of Health     Food Insecurity: No Food Insecurity (2025)    Nursing - Inadequate Food Risk Classification     Worried About Running Out of Food in the Last Year: Never true     Ran Out of Food in the Last Year: Never true   Transportation Needs: No Transportation Needs (2025)    PRAPARE - Transportation     Lack of Transportation (Medical): No     Lack of Transportation (Non-Medical): No   Housing Stability: Low Risk  (2025)    Housing Stability Vital Sign     Unable to Pay for Housing in the Last Year: No     Number of Times Moved in the Last Year: 0     Homeless in the Last Year: No   Utilities: Not At Risk (2025)    [facility] Utilities     Threatened with loss of utilities: No     No results found.    Objective   /72 (BP Location: Left arm, Patient Position: Sitting, Cuff Size: Adult)   Pulse 81   Temp (!) 97.4 °F (36.3 °C) (Temporal)   Ht 5' 5\" (1.651 m)   Wt 73.5 kg (162 lb)   LMP  (LMP Unknown) "   SpO2 97%   BMI 26.96 kg/m²     Physical Exam

## 2025-06-20 NOTE — ASSESSMENT & PLAN NOTE
Mood appears stable.  Continue with current treatment of Lexapro.  Follow-up with patient in 6 months.

## 2025-06-20 NOTE — ASSESSMENT & PLAN NOTE
Stable.  Continue with a strict low-fat/low-cholesterol diet as well as current treatment with fenofibrate.

## 2025-07-22 DIAGNOSIS — E78.5 HYPERLIPIDEMIA, UNSPECIFIED HYPERLIPIDEMIA TYPE: ICD-10-CM

## 2025-07-23 RX ORDER — FENOFIBRATE 54 MG/1
54 TABLET ORAL DAILY
Qty: 90 TABLET | Refills: 1 | Status: SHIPPED | OUTPATIENT
Start: 2025-07-23